# Patient Record
Sex: FEMALE | Race: WHITE | NOT HISPANIC OR LATINO | Employment: FULL TIME | URBAN - METROPOLITAN AREA
[De-identification: names, ages, dates, MRNs, and addresses within clinical notes are randomized per-mention and may not be internally consistent; named-entity substitution may affect disease eponyms.]

---

## 2017-01-02 RX ORDER — SUMATRIPTAN 50 MG/1
TABLET, FILM COATED ORAL
Qty: 9 TAB | Refills: 6 | Status: SHIPPED | OUTPATIENT
Start: 2017-01-02 | End: 2017-05-17

## 2017-01-11 ENCOUNTER — APPOINTMENT (OUTPATIENT)
Dept: ADMISSIONS | Facility: MEDICAL CENTER | Age: 57
End: 2017-01-11
Payer: COMMERCIAL

## 2017-01-15 ENCOUNTER — OFFICE VISIT (OUTPATIENT)
Dept: URGENT CARE | Facility: CLINIC | Age: 57
End: 2017-01-15
Payer: COMMERCIAL

## 2017-01-15 VITALS
SYSTOLIC BLOOD PRESSURE: 106 MMHG | HEART RATE: 64 BPM | WEIGHT: 140 LBS | DIASTOLIC BLOOD PRESSURE: 62 MMHG | HEIGHT: 67 IN | OXYGEN SATURATION: 98 % | BODY MASS INDEX: 21.97 KG/M2 | RESPIRATION RATE: 18 BRPM | TEMPERATURE: 97.3 F

## 2017-01-15 DIAGNOSIS — H00.012 HORDEOLUM EXTERNUM OF RIGHT LOWER EYELID: ICD-10-CM

## 2017-01-15 PROCEDURE — 99214 OFFICE O/P EST MOD 30 MIN: CPT | Performed by: PHYSICIAN ASSISTANT

## 2017-01-15 RX ORDER — POLYMYXIN B SULFATE AND TRIMETHOPRIM 1; 10000 MG/ML; [USP'U]/ML
1 SOLUTION OPHTHALMIC EVERY 4 HOURS
Qty: 1 BOTTLE | Refills: 0 | Status: SHIPPED | OUTPATIENT
Start: 2017-01-15 | End: 2017-01-25

## 2017-01-15 ASSESSMENT — ENCOUNTER SYMPTOMS
WHEEZING: 0
EYE DISCHARGE: 1
PHOTOPHOBIA: 0
SORE THROAT: 0
SHORTNESS OF BREATH: 0
DOUBLE VISION: 0
COUGH: 0
CHILLS: 0
EYE PAIN: 1
BLURRED VISION: 1
HEADACHES: 0
PALPITATIONS: 0
EYE REDNESS: 0
FEVER: 0

## 2017-01-15 NOTE — MR AVS SNAPSHOT
"Evelyn Meeks   1/15/2017 12:45 PM   Office Visit   MRN: [id number]    Department:  [facility]   Dept Phone:  [phone]    Description:  Female : 1960   Provider:  Yair Escamilla PA-C           Reason for Visit     Eye Pain (R) side x3 days, blurry vision, swelling underneath eye      Allergies as of 1/15/2017     Allergen Noted Reactions    Shellfish Allergy 2012   Anaphylaxis    RXN=who;e life    Pet [Cat Hair Extract] 2013   Hives    RXN=whole life    Pollen Extract 2013   Itching    RXN=whole life    Lidocaine (Anorectal) [Topicaine] 2016   Rash, Itching    Rash itching    Other Food 2017   Anaphylaxis    Wine coffee      Vital Signs     Blood Pressure Pulse Temperature Respirations Height Weight    106/62 mmHg 64 36.3 °C (97.3 °F) 18 1.702 m (5' 7.01\") 63.504 kg (140 lb)    Body Mass Index Oxygen Saturation Last Menstrual Period Smoking Status          21.92 kg/m2 98% 2002 Never Smoker         Basic Information     Date Of Birth Sex Race Ethnicity Preferred Language    1960 Female White Non- English      Your appointments     2017  8:20 AM   Established Patient with Segundo Crawford D.O.   [address] [id number] [phone]           You will be receiving a confirmation call a few days before your appointment from our automated call confirmation system.              Problem List              ICD-10-CM Priority Class Noted - Resolved    Dyslipidemia E78.5   2011 - Present    Anal fissure K60.2   10/6/2011 - Present    Gastroesophageal reflux disease K21.9   2012 - Present    ADD (attention deficit disorder) F98.8   3/26/2013 - Present    Grief at loss of child F43.21, Z63.4   2015 - Present    Osteoporosis M81.0   1/15/2016 - Present    Other specified hypothyroidism E03.8   2016 - Present    Closed fracture of distal end of right radius " S52.501A   8/10/2016 - Present    Thoracic back pain M54.6   12/12/2016 - Present      Health Maintenance        Date Due Completion Dates    MAMMOGRAM 9/1/2017 9/1/2016, 9/12/2014, 9/2/2011    COLONOSCOPY 9/3/2022 9/3/2012 (Done)    Override on 9/3/2012: Done ([facility] MD Martell)    IMM DTaP/Tdap/Td Vaccine (2 - Td) 6/19/2025 6/19/2015            Current Immunizations     Influenza TIV (IM) 11/22/2013, 10/2/2012    Influenza Vaccine Quad Inj (Pf) 10/25/2016, 11/18/2015, 10/2/2014    Tdap Vaccine 6/19/2015    Tuberculin Skin Test 8/5/2014, 10/15/2013  8:05 AM, 10/17/2012  9:30 AM, 8/22/2012  9:35 AM, 10/18/2011, 11/3/2010  9:34 AM      Below and/or attached are the medications your provider expects you to take. Review all of your home medications and newly ordered medications with your provider and/or pharmacist. Follow medication instructions as directed by your provider and/or pharmacist. Please keep your medication list with you and share with your provider. Update the information when medications are discontinued, doses are changed, or new medications (including over-the-counter products) are added; and carry medication information at all times in the event of emergency situations     Allergies:  SHELLFISH ALLERGY - Anaphylaxis     PET - Hives     POLLEN EXTRACT - Itching     LIDOCAINE (ANORECTAL) - Rash,Itching     OTHER FOOD - Anaphylaxis               Medications  Valid as of: January 15, 2017 -  1:11 PM    Generic Name Brand Name Tablet Size Instructions for use    Alendronate Sodium (Tab) FOSAMAX 70 MG Take 70 mg by mouth every 7 days.        Cholecalciferol (Cap) Vitamin D-3 1000 UNITS Take 2,000 Units by mouth every day.        Gabapentin (Cap) NEURONTIN 300 MG Take 1 Cap by mouth 3 times a day. Start with 300 mg each day for 3 days, then 300 mg bid x 3 days.        Hydrocodone-Acetaminophen (Tab) NORCO 5-325 MG         Ibuprofen (Tab) MOTRIN 200 MG Take 600 mg by mouth every four hours as needed  for Mild Pain.        Levothyroxine Sodium (Tab) SYNTHROID 50 MCG TAKE ONE TABLET BY MOUTH EVERY MORNING ON AN EMPTY STOMACH        Loratadine (Tab) CLARITIN 10 MG TAKE ONE TABLET BY MOUTH DAILY        Pravastatin Sodium (Tab) PRAVACHOL 40 MG Take 1 Tab by mouth every day.        SUMAtriptan Succinate (Tab) IMITREX 50 MG TAKE ONE TABLET BY MOUTH AT ONSET OF HEADACHE; MAY REPEAT ONE TABLET IN 2 HOURS AS NEEDED. MAX OF 2 DOSES A DAY        TraMADol HCl (Tab) ULTRAM 50 MG Take 1 Tab by mouth every four hours as needed for Moderate Pain.        .                 Medicines prescribed today were sent to:     [facility] #[id number] - [place], [place] - [address]    [address] [address]    Phone: [phone] Fax: [phone]    Open 24 Hours?: No      Medication refill instructions:       If your prescription bottle indicates you have medication refills left, it is not necessary to call your provider’s office. Please contact your pharmacy and they will refill your medication.    If your prescription bottle indicates you do not have any refills left, you may request refills at any time through one of the following ways: The online [employer] system (except Urgent Care), by calling your provider’s office, or by asking your pharmacy to contact your provider’s office with a refill request. Medication refills are processed only during regular business hours and may not be available until the next business day. Your provider may request additional information or to have a follow-up visit with you prior to refilling your medication.   *Please Note: Medication refills are assigned a new Rx number when refilled electronically. Your pharmacy may indicate that no refills were authorized even though a new prescription for the same medication is available at the pharmacy. Please request the medicine by name with the pharmacy before contacting your provider for a refill.           [employer] Access Code: Activation  code not generated  Current MyChart Status: Active

## 2017-01-15 NOTE — PROGRESS NOTES
Subjective:      Evelyn Meeks is a 56 y.o. female who presents with Eye Pain            Eye Injury   The right eye is affected. This is a new problem. Episode onset: 3 days ago. There was no injury mechanism. The pain is moderate. There is no known exposure to pink eye. She does not wear contacts. Associated symptoms include blurred vision and an eye discharge. Pertinent negatives include no double vision, eye redness, fever or photophobia. She has tried nothing for the symptoms.       Review of Systems   Constitutional: Negative for fever and chills.   HENT: Negative for congestion, ear pain and sore throat.    Eyes: Positive for blurred vision, pain and discharge. Negative for double vision, photophobia and redness.   Respiratory: Negative for cough, shortness of breath and wheezing.    Cardiovascular: Negative for chest pain and palpitations.   Skin: Negative for rash.   Neurological: Negative for headaches.     All other systems reviewed and are negative.  PMH:  has a past medical history of Hemorrhoid; Kidney stone; Dyslipidemia (8/19/2011); Anal fissure (10/6/2011); Gastroesophageal reflux disease (9/7/2012); No pertinent past medical history; Grief at loss of child (8/4/2015); Osteoporosis (1/15/2016); Measles; Mumps; Chicken pox; UTI (lower urinary tract infection) (2014); High cholesterol; Bronchitis (2015); Pneumonia (2015); Disorder of thyroid; Back pain; and Migraine.  MEDS:   Current outpatient prescriptions:   •  polymixin-trimethoprim (POLYTRIM) 80439-2.1 UNIT/ML-% Solution, Place 1 Drop in both eyes every 4 hours for 10 days., Disp: 1 Bottle, Rfl: 0  •  sumatriptan (IMITREX) 50 MG Tab, TAKE ONE TABLET BY MOUTH AT ONSET OF HEADACHE; MAY REPEAT ONE TABLET IN 2 HOURS AS NEEDED. MAX OF 2 DOSES A DAY, Disp: 9 Tab, Rfl: 6  •  tramadol (ULTRAM) 50 MG Tab, Take 1 Tab by mouth every four hours as needed for Moderate Pain., Disp: 90 Tab, Rfl: 3  •  gabapentin (NEURONTIN) 300 MG Cap, Take 1 Cap by  mouth 3 times a day. Start with 300 mg each day for 3 days, then 300 mg bid x 3 days., Disp: 90 Cap, Rfl: 3  •  Cholecalciferol (VITAMIN D-3) 1000 UNITS Cap, Take 2,000 Units by mouth every day., Disp: , Rfl:   •  levothyroxine (SYNTHROID) 50 MCG Tab, TAKE ONE TABLET BY MOUTH EVERY MORNING ON AN EMPTY STOMACH, Disp: 30 Tab, Rfl: 2  •  alendronate (FOSAMAX) 70 MG Tab, Take 70 mg by mouth every 7 days., Disp: , Rfl:   •  ibuprofen (MOTRIN) 200 MG Tab, Take 600 mg by mouth every four hours as needed for Mild Pain., Disp: , Rfl:   •  pravastatin (PRAVACHOL) 40 MG tablet, Take 1 Tab by mouth every day., Disp: 30 Tab, Rfl: 11  •  loratadine (CLARITIN) 10 MG Tab, TAKE ONE TABLET BY MOUTH DAILY, Disp: 30 Tab, Rfl: 11  •  hydrocodone-acetaminophen (NORCO) 5-325 MG Tab per tablet, , Disp: , Rfl:   ALLERGIES:   Allergies   Allergen Reactions   • Shellfish Allergy Anaphylaxis     RXN=who;e life   • Pet [Cat Hair Extract] Hives     RXN=whole life   • Pollen Extract Itching     RXN=whole life   • Lidocaine (Anorectal) [Topicaine] Rash and Itching     Rash itching   • Other Food Anaphylaxis     Wine coffee     SURGHX:   Past Surgical History   Procedure Laterality Date   • Tonsillectomy     • Hemorrhoidectomy     • Wrist orif Right 8/10/2016     Procedure: WRIST ORIF;  Surgeon: Jono Maynard M.D.;  Location: SURGERY [facility];  Service:    • Pr inject luis daniel chauhan multpl  12/12/2016     Procedure: INJ-INTERCOSTAL MULTIPLE - T9, T10;  Surgeon: Rafat Nichols D.O.;  Location: SURGERY [facility] ORS;  Service: Pain Management   • Abdominal hysterectomy total     • Gyn surgery       c sections x 2   • Gyn surgery       multiple D&C's     SOCHX:  reports that she has never smoked. She has never used smokeless tobacco. She reports that she does not drink alcohol or use illicit drugs.  FH: Family history was reviewed, no pertinent findings to report  Medications, Allergies, and current problem list reviewed today in  "Epic       Objective:     /62 mmHg  Pulse 64  Temp(Src) 36.3 °C (97.3 °F)  Resp 18  Ht 1.702 m (5' 7.01\")  Wt 63.504 kg (140 lb)  BMI 21.92 kg/m2  SpO2 98%  LMP 08/28/2002     Physical Exam   Constitutional: She is oriented to person, place, and time. She appears well-developed and well-nourished.   HENT:   Head: Normocephalic and atraumatic.   Right Ear: Hearing, tympanic membrane, external ear and ear canal normal.   Left Ear: Hearing, tympanic membrane, external ear and ear canal normal.   Nose: Nose normal.   Mouth/Throat: Uvula is midline, oropharynx is clear and moist and mucous membranes are normal.   Eyes: Conjunctivae, EOM and lids are normal. Pupils are equal, round, and reactive to light. Lids are everted and swept, no foreign bodies found. Right eye exhibits hordeolum. Right eye exhibits no chemosis, no discharge and no exudate. No foreign body present in the right eye. Left eye exhibits no discharge. Right conjunctiva is not injected.       Neck: Normal range of motion. Neck supple.   Cardiovascular: Normal rate, regular rhythm and normal heart sounds.    Pulmonary/Chest: Effort normal and breath sounds normal.   Neurological: She is alert and oriented to person, place, and time.   Skin: Skin is warm and dry.   Vitals reviewed.              Assessment/Plan:   Patient is a 56 year old female who presents with eye pain for 3 days.  Patient eye pain. No contact lens use. Unknown exposure to pink eye. No vision loss. No itching or clear allergic trigger. No trauma or FB.  There is some swelling in the right lower lid. Appears to be a hordeolum.  No scleral injection.       1. Hordeolum externum of right lower eyelid  -warm/cold compress  - polymixin-trimethoprim (POLYTRIM) 85376-2.1 UNIT/ML-% Solution; Place 1 Drop in both eyes every 4 hours for 10 days.  Dispense: 1 Bottle; Refill: 0    Differential diagnosis, natural history, supportive care, and indications for immediate follow-up discussed " at length.   Follow-up with primary care provider within 4-5 days, emergency room precautions discussed.  Patient and/or family appears understanding of information.

## 2017-01-19 ENCOUNTER — OFFICE VISIT (OUTPATIENT)
Dept: MEDICAL GROUP | Facility: CLINIC | Age: 57
End: 2017-01-19
Payer: COMMERCIAL

## 2017-01-19 ENCOUNTER — HOSPITAL ENCOUNTER (OUTPATIENT)
Dept: RADIOLOGY | Facility: MEDICAL CENTER | Age: 57
End: 2017-01-19
Attending: INTERNAL MEDICINE
Payer: COMMERCIAL

## 2017-01-19 VITALS
WEIGHT: 146 LBS | DIASTOLIC BLOOD PRESSURE: 70 MMHG | OXYGEN SATURATION: 96 % | HEIGHT: 67 IN | RESPIRATION RATE: 14 BRPM | SYSTOLIC BLOOD PRESSURE: 126 MMHG | HEART RATE: 100 BPM | TEMPERATURE: 96.8 F | BODY MASS INDEX: 22.91 KG/M2

## 2017-01-19 DIAGNOSIS — R07.81 RIB PAIN ON LEFT SIDE: ICD-10-CM

## 2017-01-19 DIAGNOSIS — R53.83 OTHER FATIGUE: ICD-10-CM

## 2017-01-19 PROCEDURE — 71101 X-RAY EXAM UNILAT RIBS/CHEST: CPT | Mod: LT

## 2017-01-19 PROCEDURE — 99213 OFFICE O/P EST LOW 20 MIN: CPT | Performed by: INTERNAL MEDICINE

## 2017-01-19 NOTE — PROGRESS NOTES
CC: Evelyn Meeks is a 56 y.o. female is suffering from   Chief Complaint   Patient presents with   • Follow-Up         SUBJECTIVE:  1. Rib pain on left side  Patient's here for follow-up, fractured her ribs January 2016. Continues to have pain and discomfort associated with her left ninth and 10th ribs, x-rays have been ordered.     2. Other fatigue  Patient she needs to suffer with fatigue, I'm concerned that this may be another issue associated either with medications or other undisclosed medical problem.         Past social, family, history:   Social History   Substance Use Topics   • Smoking status: Never Smoker    • Smokeless tobacco: Never Used   • Alcohol Use: No         MEDICATIONS:    Current outpatient prescriptions:   •  polymixin-trimethoprim (POLYTRIM) 46825-7.1 UNIT/ML-% Solution, Place 1 Drop in both eyes every 4 hours for 10 days., Disp: 1 Bottle, Rfl: 0  •  sumatriptan (IMITREX) 50 MG Tab, TAKE ONE TABLET BY MOUTH AT ONSET OF HEADACHE; MAY REPEAT ONE TABLET IN 2 HOURS AS NEEDED. MAX OF 2 DOSES A DAY, Disp: 9 Tab, Rfl: 6  •  tramadol (ULTRAM) 50 MG Tab, Take 1 Tab by mouth every four hours as needed for Moderate Pain., Disp: 90 Tab, Rfl: 3  •  gabapentin (NEURONTIN) 300 MG Cap, Take 1 Cap by mouth 3 times a day. Start with 300 mg each day for 3 days, then 300 mg bid x 3 days., Disp: 90 Cap, Rfl: 3  •  Cholecalciferol (VITAMIN D-3) 1000 UNITS Cap, Take 2,000 Units by mouth every day., Disp: , Rfl:   •  levothyroxine (SYNTHROID) 50 MCG Tab, TAKE ONE TABLET BY MOUTH EVERY MORNING ON AN EMPTY STOMACH, Disp: 30 Tab, Rfl: 2  •  hydrocodone-acetaminophen (NORCO) 5-325 MG Tab per tablet, , Disp: , Rfl:   •  alendronate (FOSAMAX) 70 MG Tab, Take 70 mg by mouth every 7 days., Disp: , Rfl:   •  ibuprofen (MOTRIN) 200 MG Tab, Take 600 mg by mouth every four hours as needed for Mild Pain., Disp: , Rfl:   •  pravastatin (PRAVACHOL) 40 MG tablet, Take 1 Tab by mouth every day., Disp: 30 Tab, Rfl:  "11  •  loratadine (CLARITIN) 10 MG Tab, TAKE ONE TABLET BY MOUTH DAILY, Disp: 30 Tab, Rfl: 11    PROBLEMS:  Patient Active Problem List    Diagnosis Date Noted   • Thoracic back pain 12/12/2016   • Closed fracture of distal end of right radius 08/10/2016   • Other specified hypothyroidism 07/19/2016   • Osteoporosis 01/15/2016   • Grief at loss of child 08/04/2015   • ADD (attention deficit disorder) 03/26/2013   • Gastroesophageal reflux disease 09/07/2012   • Anal fissure 10/06/2011   • Dyslipidemia 08/19/2011       REVIEW OF SYSTEMS:  Gen.:  No Nausea, Vomiting, fever, Chills.  Heart: No chest pain.  Lungs:  No shortness of Breath.  Psychological: Geo unusual Anxiety depression     PHYSICAL EXAM   Constitutional: Alert, cooperative, not in acute distress.  Cardiovascular:  Rate Rhythm is regular without murmurs rubs clicks.     Thorax & Lungs: Clear to auscultation, no wheezing, rhonchi, or rales  HENT: Normocephalic, Atraumatic.  Eyes: PERRLA, EOMI, Conjunctiva normal.   Neck: Trachia is midline no swelling of the thyroid.   Musculoskeletal: Patient with pain to palpation T9 T10 ribs midaxillary line  Neurologic: Alert & oriented x 3, cranial nerves II through XII are intact, Normal motor function, Normal sensory function, No focal deficits noted.   Psychiatric: Affect normal, Judgment normal, Mood normal.     VITAL SIGNS:/70 mmHg  Pulse 100  Temp(Src) 36 °C (96.8 °F)  Resp 14  Ht 1.702 m (5' 7\")  Wt 66.225 kg (146 lb)  BMI 22.86 kg/m2  SpO2 96%  LMP 08/28/2002    Labs: Reviewed    Assessment:                                                     Plan:    1. Rib pain on left side  Patient with ongoing left ribs pain, we'll discuss the case with Dr. Mccauley this afternoon if possible.   - [id number] (W/O CXR) LEFT; Future    2. Other fatigue  Patient ongoing problems with fatigue, etiology uncertain suspect this multifactorial and continues to need to be addressed. Patient's mammogram from " September 2016 was reviewed.

## 2017-01-19 NOTE — MR AVS SNAPSHOT
"Evelyn Meeks   2017 8:20 AM   Office Visit   MRN: [id number]    Department:  [facility]   Dept Phone:  [phone]    Description:  Female : 1960   Provider:  Segundo Crawford D.O.           Reason for Visit     Follow-Up           Allergies as of 2017     Allergen Noted Reactions    Shellfish Allergy 2012   Anaphylaxis    RXN=who;e life    Pet [Cat Hair Extract] 2013   Hives    RXN=whole life    Pollen Extract 2013   Itching    RXN=whole life    Lidocaine (Anorectal) [Topicaine] 2016   Rash, Itching    Rash itching    Other Food 2017   Anaphylaxis    Wine coffee      You were diagnosed with     Rib pain on left side   [256189]       Other fatigue   [[id number]]         Vital Signs     Blood Pressure Pulse Temperature Respirations Height Weight    126/70 mmHg 100 36 °C (96.8 °F) 14 1.702 m (5' 7\") 66.225 kg (146 lb)    Body Mass Index Oxygen Saturation Last Menstrual Period Smoking Status          22.86 kg/m2 96% 2002 Never Smoker         Basic Information     Date Of Birth Sex Race Ethnicity Preferred Language    1960 Female White Non- English      Your appointments     2017  8:40 AM   Established Patient with Segundo Crawford D.O.   [address] [id number] [phone]           You will be receiving a confirmation call a few days before your appointment from our automated call confirmation system.              Problem List              ICD-10-CM Priority Class Noted - Resolved    Dyslipidemia E78.5   2011 - Present    Anal fissure K60.2   10/6/2011 - Present    Gastroesophageal reflux disease K21.9   2012 - Present    ADD (attention deficit disorder) F98.8   3/26/2013 - Present    Grief at loss of child F43.21, Z63.4   2015 - Present    Osteoporosis M81.0   1/15/2016 - Present    Other specified hypothyroidism E03.8   2016 - Present   " Closed fracture of distal end of right radius S52.501A   8/10/2016 - Present    Thoracic back pain M54.6   12/12/2016 - Present      Health Maintenance        Date Due Completion Dates    MAMMOGRAM 9/1/2017 9/1/2016, 9/12/2014, 9/2/2011    COLONOSCOPY 9/3/2022 9/3/2012 (Done)    Override on 9/3/2012: Done ([facility] MD Martell)    IMM DTaP/Tdap/Td Vaccine (2 - Td) 6/19/2025 6/19/2015            Current Immunizations     Influenza TIV (IM) 11/22/2013, 10/2/2012    Influenza Vaccine Quad Inj (Pf) 10/25/2016, 11/18/2015, 10/2/2014    Tdap Vaccine 6/19/2015    Tuberculin Skin Test 8/5/2014, 10/15/2013  8:05 AM, 10/17/2012  9:30 AM, 8/22/2012  9:35 AM, 10/18/2011, 11/3/2010  9:34 AM      Below and/or attached are the medications your provider expects you to take. Review all of your home medications and newly ordered medications with your provider and/or pharmacist. Follow medication instructions as directed by your provider and/or pharmacist. Please keep your medication list with you and share with your provider. Update the information when medications are discontinued, doses are changed, or new medications (including over-the-counter products) are added; and carry medication information at all times in the event of emergency situations     Allergies:  SHELLFISH ALLERGY - Anaphylaxis     PET - Hives     POLLEN EXTRACT - Itching     LIDOCAINE (ANORECTAL) - Rash,Itching     OTHER FOOD - Anaphylaxis               Medications  Valid as of: January 19, 2017 -  9:47 AM    Generic Name Brand Name Tablet Size Instructions for use    Alendronate Sodium (Tab) FOSAMAX 70 MG Take 70 mg by mouth every 7 days.        Cholecalciferol (Cap) Vitamin D-3 1000 UNITS Take 2,000 Units by mouth every day.        Gabapentin (Cap) NEURONTIN 300 MG Take 1 Cap by mouth 3 times a day. Start with 300 mg each day for 3 days, then 300 mg bid x 3 days.        Hydrocodone-Acetaminophen (Tab) NORCO 5-325 MG         Ibuprofen (Tab) MOTRIN 200 MG  Take 600 mg by mouth every four hours as needed for Mild Pain.        Levothyroxine Sodium (Tab) SYNTHROID 50 MCG TAKE ONE TABLET BY MOUTH EVERY MORNING ON AN EMPTY STOMACH        Loratadine (Tab) CLARITIN 10 MG TAKE ONE TABLET BY MOUTH DAILY        Polymyxin B-Trimethoprim (Solution) POLYTRIM 54713-5.1 UNIT/ML-% Place 1 Drop in both eyes every 4 hours for 10 days.        Pravastatin Sodium (Tab) PRAVACHOL 40 MG Take 1 Tab by mouth every day.        SUMAtriptan Succinate (Tab) IMITREX 50 MG TAKE ONE TABLET BY MOUTH AT ONSET OF HEADACHE; MAY REPEAT ONE TABLET IN 2 HOURS AS NEEDED. MAX OF 2 DOSES A DAY        TraMADol HCl (Tab) ULTRAM 50 MG Take 1 Tab by mouth every four hours as needed for Moderate Pain.        .                 Medicines prescribed today were sent to:     [facility] #[id number] - [place], [place] - [address] [address]    Phone: [phone] Fax: [phone]    Open 24 Hours?: No      Medication refill instructions:       If your prescription bottle indicates you have medication refills left, it is not necessary to call your provider’s office. Please contact your pharmacy and they will refill your medication.    If your prescription bottle indicates you do not have any refills left, you may request refills at any time through one of the following ways: The online [employer] system (except Urgent Care), by calling your provider’s office, or by asking your pharmacy to contact your provider’s office with a refill request. Medication refills are processed only during regular business hours and may not be available until the next business day. Your provider may request additional information or to have a follow-up visit with you prior to refilling your medication.   *Please Note: Medication refills are assigned a new Rx number when refilled electronically. Your pharmacy may indicate that no refills were authorized even though a new prescription for the same medication  is available at the pharmacy. Please request the medicine by name with the pharmacy before contacting your provider for a refill.        Your To Do List     Future Labs/Procedures Complete By Expires    [id number] (W/O CXR) LEFT  As directed 1/19/2018         [employer] Access Code: Activation code not generated  Current [employer] Status: Active

## 2017-01-20 PROBLEM — G56.01 CARPAL TUNNEL SYNDROME ON RIGHT: Status: ACTIVE | Noted: 2017-01-20

## 2017-02-01 RX ORDER — ALENDRONATE SODIUM 70 MG/1
TABLET ORAL
Qty: 4 TAB | Refills: 10 | Status: SHIPPED | OUTPATIENT
Start: 2017-02-01 | End: 2017-06-06

## 2017-02-17 ENCOUNTER — TELEPHONE (OUTPATIENT)
Dept: MEDICAL GROUP | Facility: CLINIC | Age: 57
End: 2017-02-17

## 2017-02-17 ENCOUNTER — OFFICE VISIT (OUTPATIENT)
Dept: MEDICAL GROUP | Facility: CLINIC | Age: 57
End: 2017-02-17
Payer: COMMERCIAL

## 2017-02-17 VITALS
SYSTOLIC BLOOD PRESSURE: 108 MMHG | HEART RATE: 68 BPM | TEMPERATURE: 96.4 F | DIASTOLIC BLOOD PRESSURE: 74 MMHG | WEIGHT: 142 LBS | HEIGHT: 67 IN | OXYGEN SATURATION: 98 % | BODY MASS INDEX: 22.29 KG/M2 | RESPIRATION RATE: 14 BRPM

## 2017-02-17 DIAGNOSIS — Z63.0 MARITAL CONFLICT: ICD-10-CM

## 2017-02-17 DIAGNOSIS — R07.81 RIB PAIN ON LEFT SIDE: ICD-10-CM

## 2017-02-17 PROCEDURE — 99213 OFFICE O/P EST LOW 20 MIN: CPT | Performed by: INTERNAL MEDICINE

## 2017-02-17 SDOH — SOCIAL STABILITY - SOCIAL INSECURITY: PROBLEMS IN RELATIONSHIP WITH SPOUSE OR PARTNER: Z63.0

## 2017-02-17 NOTE — TELEPHONE ENCOUNTER
----- Message from Segundo Crawford D.O. sent at 2/17/2017  9:05 AM PST -----  Please call Dr. Mccauley (maría ) at [facility]. So I can talk to him about Elissae.   Regards, Segundo Crawford, DO

## 2017-02-17 NOTE — MR AVS SNAPSHOT
"        Evelyn Meeks   2017 8:40 AM   Office Visit   MRN: [id number]    Department:  [facility]   Dept Phone:  [phone]    Description:  Female : 1960   Provider:  Segundo Crawford D.O.           Reason for Visit     Follow-Up 1 month      Allergies as of 2017     Allergen Noted Reactions    Other Food 2017   Anaphylaxis    Wine coffee    Shellfish Allergy 2012   Anaphylaxis    RXN=whole life    Lidocaine (Anorectal) [Topicaine] 2016   Rash, Itching     patch glue        Pet [Cat Hair Extract] 2013   Hives    RXN=whole life    Pollen Extract 2013   Itching    RXN=whole life      Vital Signs     Blood Pressure Pulse Temperature Respirations Height Weight    108/74 mmHg 68 35.8 °C (96.4 °F) 14 1.702 m (5' 7\") 64.411 kg (142 lb)    Body Mass Index Oxygen Saturation Last Menstrual Period Smoking Status          22.24 kg/m2 98% 2002 Never Smoker         Basic Information     Date Of Birth Sex Race Ethnicity Preferred Language    1960 Female White Non- English      Problem List              ICD-10-CM Priority Class Noted - Resolved    Dyslipidemia E78.5   2011 - Present    Anal fissure K60.2   10/6/2011 - Present    Gastroesophageal reflux disease K21.9   2012 - Present    ADD (attention deficit disorder) F98.8   3/26/2013 - Present    Grief at loss of child F43.21, Z63.4   2015 - Present    Osteoporosis M81.0   1/15/2016 - Present    Other specified hypothyroidism E03.8   2016 - Present    Closed fracture of distal end of right radius S52.501A   8/10/2016 - Present    Thoracic back pain M54.6   2016 - Present    Carpal tunnel syndrome on right G56.01   2017 - Present      Health Maintenance        Date Due Completion Dates    MAMMOGRAM 2017, 2014, 2011    COLONOSCOPY 9/3/2022 9/3/2012 (Done)    Override on 9/3/2012: Done ([facility] MD Martell)    IMM DTaP/Tdap/Td Vaccine (2 - " Td) 6/19/2025 6/19/2015            Current Immunizations     Influenza TIV (IM) 11/22/2013, 10/2/2012    Influenza Vaccine Quad Inj (Pf) 10/25/2016, 11/18/2015, 10/2/2014    Tdap Vaccine 6/19/2015    Tuberculin Skin Test 8/5/2014, 10/15/2013  8:05 AM, 10/17/2012  9:30 AM, 8/22/2012  9:35 AM, 10/18/2011, 11/3/2010  9:34 AM      Below and/or attached are the medications your provider expects you to take. Review all of your home medications and newly ordered medications with your provider and/or pharmacist. Follow medication instructions as directed by your provider and/or pharmacist. Please keep your medication list with you and share with your provider. Update the information when medications are discontinued, doses are changed, or new medications (including over-the-counter products) are added; and carry medication information at all times in the event of emergency situations     Allergies:  OTHER FOOD - Anaphylaxis     SHELLFISH ALLERGY - Anaphylaxis     LIDOCAINE (ANORECTAL) - Rash,Itching     PET - Hives     POLLEN EXTRACT - Itching               Medications  Valid as of: February 17, 2017 -  9:08 AM    Generic Name Brand Name Tablet Size Instructions for use    Alendronate Sodium (Tab) FOSAMAX 70 MG TAKE 1 TABLET BY MOUTH ONCE WEEKLY BEFORE BREAKFAST, ON AN EMPTY STOMACH: REMAIN UPRIGHT FOR 30 MINUTES        Cholecalciferol (Cap) Vitamin D-3 1000 UNITS Take 2,000 Units by mouth every day.        Gabapentin (Cap) NEURONTIN 300 MG Take 1 Cap by mouth 3 times a day. Start with 300 mg each day for 3 days, then 300 mg bid x 3 days.        Hydrocodone-Acetaminophen (Tab) NORCO 5-325 MG Take 1-2 Tabs by mouth every four hours as needed.        Ibuprofen (Tab) MOTRIN 200 MG Take 600 mg by mouth every four hours as needed for Mild Pain.        Levothyroxine Sodium (Tab) SYNTHROID 50 MCG TAKE ONE TABLET BY MOUTH EVERY MORNING ON AN EMPTY STOMACH        Loratadine (Tab) CLARITIN 10 MG TAKE ONE TABLET BY MOUTH DAILY         Pravastatin Sodium (Tab) PRAVACHOL 40 MG Take 1 Tab by mouth every day.        SUMAtriptan Succinate (Tab) IMITREX 50 MG TAKE ONE TABLET BY MOUTH AT ONSET OF HEADACHE; MAY REPEAT ONE TABLET IN 2 HOURS AS NEEDED. MAX OF 2 DOSES A DAY        TraMADol HCl (Tab) ULTRAM 50 MG Take 1 Tab by mouth every four hours as needed for Moderate Pain.        .                 Medicines prescribed today were sent to:     [facility] #[id number] - [place], [place] - [address]    [address] [place] [address]    Phone: [phone] Fax: [phone]    Open 24 Hours?: No      Medication refill instructions:       If your prescription bottle indicates you have medication refills left, it is not necessary to call your provider’s office. Please contact your pharmacy and they will refill your medication.    If your prescription bottle indicates you do not have any refills left, you may request refills at any time through one of the following ways: The online [employer] system (except Urgent Care), by calling your provider’s office, or by asking your pharmacy to contact your provider’s office with a refill request. Medication refills are processed only during regular business hours and may not be available until the next business day. Your provider may request additional information or to have a follow-up visit with you prior to refilling your medication.   *Please Note: Medication refills are assigned a new Rx number when refilled electronically. Your pharmacy may indicate that no refills were authorized even though a new prescription for the same medication is available at the pharmacy. Please request the medicine by name with the pharmacy before contacting your provider for a refill.           [employer] Access Code: Activation code not generated  Current [employer] Status: Active

## 2017-02-17 NOTE — PROGRESS NOTES
CC: Evelyn Meeks is a 56 y.o. female is suffering from   Chief Complaint   Patient presents with   • Follow-Up     1 month         SUBJECTIVE:  1. Rib pain on left side  Patient is here complaining of continued problems with left for pain. He has been seen by Dr. Foss. Telephone call to his office, office notes obtain unfortunately Dr. Foss is on vacation patient is to be seen under Workmen's Compensation in [place] for acupuncture.     2. Marital conflict  In talking with the patient she and her  are having difficulty with her relationship, but states that her 's very distant, she was left with a responsibly for discontinuing life support for their son, was abandoned by her  and other sons regarding this issue.         Past social, family, history:   Social History   Substance Use Topics   • Smoking status: Never Smoker    • Smokeless tobacco: Never Used   • Alcohol Use: No         MEDICATIONS:    Current outpatient prescriptions:   •  alendronate (FOSAMAX) 70 MG Tab, TAKE 1 TABLET BY MOUTH ONCE WEEKLY BEFORE BREAKFAST, ON AN EMPTY STOMACH: REMAIN UPRIGHT FOR 30 MINUTES, Disp: 4 Tab, Rfl: 10  •  hydrocodone-acetaminophen (NORCO) 5-325 MG Tab per tablet, Take 1-2 Tabs by mouth every four hours as needed., Disp: 30 Tab, Rfl: 0  •  sumatriptan (IMITREX) 50 MG Tab, TAKE ONE TABLET BY MOUTH AT ONSET OF HEADACHE; MAY REPEAT ONE TABLET IN 2 HOURS AS NEEDED. MAX OF 2 DOSES A DAY, Disp: 9 Tab, Rfl: 6  •  tramadol (ULTRAM) 50 MG Tab, Take 1 Tab by mouth every four hours as needed for Moderate Pain., Disp: 90 Tab, Rfl: 3  •  gabapentin (NEURONTIN) 300 MG Cap, Take 1 Cap by mouth 3 times a day. Start with 300 mg each day for 3 days, then 300 mg bid x 3 days., Disp: 90 Cap, Rfl: 3  •  Cholecalciferol (VITAMIN D-3) 1000 UNITS Cap, Take 2,000 Units by mouth every day., Disp: , Rfl:   •  levothyroxine (SYNTHROID) 50 MCG Tab, TAKE ONE TABLET BY MOUTH EVERY MORNING ON AN EMPTY STOMACH,  "Disp: 30 Tab, Rfl: 2  •  ibuprofen (MOTRIN) 200 MG Tab, Take 600 mg by mouth every four hours as needed for Mild Pain., Disp: , Rfl:   •  pravastatin (PRAVACHOL) 40 MG tablet, Take 1 Tab by mouth every day., Disp: 30 Tab, Rfl: 11  •  loratadine (CLARITIN) 10 MG Tab, TAKE ONE TABLET BY MOUTH DAILY, Disp: 30 Tab, Rfl: 11    PROBLEMS:  Patient Active Problem List    Diagnosis Date Noted   • Carpal tunnel syndrome on right 01/20/2017   • Thoracic back pain 12/12/2016   • Closed fracture of distal end of right radius 08/10/2016   • Other specified hypothyroidism 07/19/2016   • Osteoporosis 01/15/2016   • Grief at loss of child 08/04/2015   • ADD (attention deficit disorder) 03/26/2013   • Gastroesophageal reflux disease 09/07/2012   • Anal fissure 10/06/2011   • Dyslipidemia 08/19/2011       REVIEW OF SYSTEMS:  Gen.:  No Nausea, Vomiting, fever, Chills.  Heart: No chest pain.  Lungs:  No shortness of Breath.  Psychological: Geo unusual Anxiety depression     PHYSICAL EXAM   Constitutional: Alert, cooperative, not in acute distress.  Cardiovascular:  Rate Rhythm is regular without murmurs rubs clicks.     Thorax & Lungs: Clear to auscultation, no wheezing, rhonchi, or rales  HENT: Normocephalic, Atraumatic.  Eyes: PERRLA, EOMI, Conjunctiva normal.   Neck: Trachia is midline no swelling of the thyroid.   Neurologic: Alert & oriented x 3, cranial nerves II through XII are intact, Normal motor function, Normal sensory function, No focal deficits noted.   Psychiatric: Affect normal, Judgment normal, Mood normal.     VITAL SIGNS:/74 mmHg  Pulse 68  Temp(Src) 35.8 °C (96.4 °F)  Resp 14  Ht 1.702 m (5' 7\")  Wt 64.411 kg (142 lb)  BMI 22.24 kg/m2  SpO2 98%  LMP 08/28/2002    Labs: Reviewed    Assessment:                                                     Plan:    1. Rib pain on left side  Continued musculoskeletal pain left side x-rays reviewed no evidence of obvious fracture suspect underlying damage to " neurovascular bundle.     2. Marital conflict  Patient marital conflict, is undergoing counseling through her [employer]

## 2017-02-21 ENCOUNTER — TELEPHONE (OUTPATIENT)
Dept: MEDICAL GROUP | Facility: CLINIC | Age: 57
End: 2017-02-21

## 2017-02-21 DIAGNOSIS — N39.0 URINARY TRACT INFECTION, SITE UNSPECIFIED: ICD-10-CM

## 2017-02-21 RX ORDER — NITROFURANTOIN 25; 75 MG/1; MG/1
100 CAPSULE ORAL 2 TIMES DAILY
Qty: 10 CAP | Refills: 0 | Status: SHIPPED | OUTPATIENT
Start: 2017-02-21 | End: 2017-05-26

## 2017-02-22 NOTE — TELEPHONE ENCOUNTER
1. Caller Name: Patient                                         Call Back Number: [phone] (home) [phone] (work)      Patient approves a detailed voicemail message: yes    Patient states she has a UTI and would like to get an antibiotic. Please advise.

## 2017-02-22 NOTE — TELEPHONE ENCOUNTER
Patient notified, patient asked to please have medication sent to [facility] pharmacy on [address]  Please advise.

## 2017-02-28 ENCOUNTER — TELEPHONE (OUTPATIENT)
Dept: MEDICAL GROUP | Facility: CLINIC | Age: 57
End: 2017-02-28

## 2017-02-28 DIAGNOSIS — R07.81 RIB PAIN ON LEFT SIDE: ICD-10-CM

## 2017-02-28 NOTE — TELEPHONE ENCOUNTER
1. Caller Name: [facility]                                         Call Back Number: [phone]      Patient approves a detailed voicemail message: N\A    [facility] states they need a new referral for patient to continue to be seen. Please advise.

## 2017-03-09 RX ORDER — LEVOTHYROXINE SODIUM 0.05 MG/1
TABLET ORAL
Qty: 30 TAB | Refills: 11 | Status: SHIPPED | OUTPATIENT
Start: 2017-03-09 | End: 2018-04-06 | Stop reason: SDUPTHER

## 2017-03-09 NOTE — TELEPHONE ENCOUNTER
Was the patient seen in the last year in this department? Yes    Does patient have an active prescription for medications requested? No     Received Request Via: Pharmacy

## 2017-03-17 ENCOUNTER — OFFICE VISIT (OUTPATIENT)
Dept: MEDICAL GROUP | Facility: CLINIC | Age: 57
End: 2017-03-17
Payer: COMMERCIAL

## 2017-03-17 VITALS
HEART RATE: 72 BPM | HEIGHT: 67 IN | TEMPERATURE: 98.4 F | DIASTOLIC BLOOD PRESSURE: 66 MMHG | SYSTOLIC BLOOD PRESSURE: 118 MMHG | OXYGEN SATURATION: 99 % | BODY MASS INDEX: 21.35 KG/M2 | WEIGHT: 136 LBS

## 2017-03-17 DIAGNOSIS — R91.8 ABNORMAL CT LUNG SCREENING: ICD-10-CM

## 2017-03-17 PROCEDURE — 99213 OFFICE O/P EST LOW 20 MIN: CPT | Performed by: INTERNAL MEDICINE

## 2017-03-17 NOTE — PROGRESS NOTES
CC: Evelyn Meeks is a 56 y.o. female is suffering from   Chief Complaint   Patient presents with   • Follow-Up     1 month FV         SUBJECTIVE:  1. Abnormal CT lung screening  Reviewed patients ct of the lung suspect over read.  Will verify questionable copd with pft.         Past social, family, history: .   Social History   Substance Use Topics   • Smoking status: Never Smoker    • Smokeless tobacco: Never Used   • Alcohol Use: No         MEDICATIONS:    Current outpatient prescriptions:   •  alendronate (FOSAMAX) 70 MG Tab, TAKE 1 TABLET BY MOUTH ONCE WEEKLY BEFORE BREAKFAST, ON AN EMPTY STOMACH: REMAIN UPRIGHT FOR 30 MINUTES, Disp: 4 Tab, Rfl: 10  •  hydrocodone-acetaminophen (NORCO) 5-325 MG Tab per tablet, Take 1-2 Tabs by mouth every four hours as needed., Disp: 30 Tab, Rfl: 0  •  sumatriptan (IMITREX) 50 MG Tab, TAKE ONE TABLET BY MOUTH AT ONSET OF HEADACHE; MAY REPEAT ONE TABLET IN 2 HOURS AS NEEDED. MAX OF 2 DOSES A DAY, Disp: 9 Tab, Rfl: 6  •  tramadol (ULTRAM) 50 MG Tab, Take 1 Tab by mouth every four hours as needed for Moderate Pain., Disp: 90 Tab, Rfl: 3  •  gabapentin (NEURONTIN) 300 MG Cap, Take 1 Cap by mouth 3 times a day. Start with 300 mg each day for 3 days, then 300 mg bid x 3 days., Disp: 90 Cap, Rfl: 3  •  Cholecalciferol (VITAMIN D-3) 1000 UNITS Cap, Take 2,000 Units by mouth every day., Disp: , Rfl:   •  ibuprofen (MOTRIN) 200 MG Tab, Take 600 mg by mouth every four hours as needed for Mild Pain., Disp: , Rfl:   •  pravastatin (PRAVACHOL) 40 MG tablet, Take 1 Tab by mouth every day., Disp: 30 Tab, Rfl: 11  •  loratadine (CLARITIN) 10 MG Tab, TAKE ONE TABLET BY MOUTH DAILY, Disp: 30 Tab, Rfl: 11  •  levothyroxine (SYNTHROID) 50 MCG Tab, TAKE ONE TABLET BY MOUTH EVERY MORNING ON AN EMPTY STOMACH, Disp: 30 Tab, Rfl: 11  •  nitrofurantoin monohydr macro (MACROBID) 100 MG Cap, Take 1 Cap by mouth 2 times a day., Disp: 10 Cap, Rfl: 0    PROBLEMS:  Patient Active Problem List  "   Diagnosis Date Noted   • Carpal tunnel syndrome on right 01/20/2017   • Thoracic back pain 12/12/2016   • Closed fracture of distal end of right radius 08/10/2016   • Other specified hypothyroidism 07/19/2016   • Osteoporosis 01/15/2016   • Grief at loss of child 08/04/2015   • ADD (attention deficit disorder) 03/26/2013   • Gastroesophageal reflux disease 09/07/2012   • Anal fissure 10/06/2011   • Dyslipidemia 08/19/2011       REVIEW OF SYSTEMS:  Gen.:  No Nausea, Vomiting, fever, Chills.  Heart: No chest pain.  Lungs:  No shortness of Breath.  Psychological: Geo unusual Anxiety depression     PHYSICAL EXAM   Constitutional: Alert, cooperative, not in acute distress.  Cardiovascular:  Rate Rhythm is regular without murmurs rubs clicks.     Thorax & Lungs: Clear to auscultation, no wheezing, rhonchi, or rales  HENT: Normocephalic, Atraumatic.  Eyes: PERRLA, EOMI, Conjunctiva normal.   Neck: Trachia is midline no swelling of the thyroid.   Lymphatic: No lymphadenopathy noted.   Neurologic: Alert & oriented x 3, cranial nerves II through XII are intact, Normal motor function, Normal sensory function, No focal deficits noted.   Psychiatric: Affect normal, Judgment normal, Mood normal.     VITAL SIGNS:/66 mmHg  Pulse 72  Temp(Src) 36.9 °C (98.4 °F)  Ht 1.702 m (5' 7.01\")  Wt 61.689 kg (136 lb)  BMI 21.30 kg/m2  SpO2 99%  LMP 08/28/2002    Labs: Reviewed    Assessment:                                                     Plan:    1. Abnormal CT lung screening  Orders written.   - PULMONARY FUNCTION TESTS Test requested: Complete Pulmonary Function Test          "

## 2017-03-17 NOTE — MR AVS SNAPSHOT
"        Evelyn Meeks   3/17/2017 8:40 AM   Office Visit   MRN: [id number]    Department:  [facility]   Dept Phone:  [phone]    Description:  Female : 1960   Provider:  Segundo Crawford D.O.           Reason for Visit     Follow-Up 1 month FV      Allergies as of 3/17/2017     Allergen Noted Reactions    Other Food 2017   Anaphylaxis    Wine coffee    Shellfish Allergy 2012   Anaphylaxis    RXN=whole life    Lidocaine (Anorectal) [Topicaine] 2016   Rash, Itching     patch glue        Pet [Cat Hair Extract] 2013   Hives    RXN=whole life    Pollen Extract 2013   Itching    RXN=whole life      You were diagnosed with     Abnormal CT lung screening   [201315]         Vital Signs     Blood Pressure Pulse Temperature Height Weight Body Mass Index    118/66 mmHg 72 36.9 °C (98.4 °F) 1.702 m (5' 7.01\") 61.689 kg (136 lb) 21.30 kg/m2    Oxygen Saturation Last Menstrual Period Smoking Status             99% 2002 Never Smoker          Basic Information     Date Of Birth Sex Race Ethnicity Preferred Language    1960 Female White Non- English      Problem List              ICD-10-CM Priority Class Noted - Resolved    Dyslipidemia E78.5   2011 - Present    Anal fissure K60.2   10/6/2011 - Present    Gastroesophageal reflux disease K21.9   2012 - Present    ADD (attention deficit disorder) F98.8   3/26/2013 - Present    Grief at loss of child F43.21, Z63.4   2015 - Present    Osteoporosis M81.0   1/15/2016 - Present    Other specified hypothyroidism E03.8   2016 - Present    Closed fracture of distal end of right radius S52.501A   8/10/2016 - Present    Thoracic back pain M54.6   2016 - Present    Carpal tunnel syndrome on right G56.01   2017 - Present      Health Maintenance        Date Due Completion Dates    MAMMOGRAM 2017, 2014, 2011    COLONOSCOPY 9/3/2022 9/3/2012 (Done)    Override on 9/3/2012: " Done ([facility] MD Martell)    IMM DTaP/Tdap/Td Vaccine (2 - Td) 6/19/2025 6/19/2015            Current Immunizations     Influenza TIV (IM) 11/22/2013, 10/2/2012    Influenza Vaccine Quad Inj (Pf) 10/25/2016, 11/18/2015, 10/2/2014    Tdap Vaccine 6/19/2015    Tuberculin Skin Test 8/5/2014, 10/15/2013  8:05 AM, 10/17/2012  9:30 AM, 8/22/2012  9:35 AM, 10/18/2011, 11/3/2010  9:34 AM      Below and/or attached are the medications your provider expects you to take. Review all of your home medications and newly ordered medications with your provider and/or pharmacist. Follow medication instructions as directed by your provider and/or pharmacist. Please keep your medication list with you and share with your provider. Update the information when medications are discontinued, doses are changed, or new medications (including over-the-counter products) are added; and carry medication information at all times in the event of emergency situations     Allergies:  OTHER FOOD - Anaphylaxis     SHELLFISH ALLERGY - Anaphylaxis     LIDOCAINE (ANORECTAL) - Rash,Itching     PET - Hives     POLLEN EXTRACT - Itching               Medications  Valid as of: March 17, 2017 -  9:10 AM    Generic Name Brand Name Tablet Size Instructions for use    Alendronate Sodium (Tab) FOSAMAX 70 MG TAKE 1 TABLET BY MOUTH ONCE WEEKLY BEFORE BREAKFAST, ON AN EMPTY STOMACH: REMAIN UPRIGHT FOR 30 MINUTES        Cholecalciferol (Cap) Vitamin D-3 1000 UNITS Take 2,000 Units by mouth every day.        Gabapentin (Cap) NEURONTIN 300 MG Take 1 Cap by mouth 3 times a day. Start with 300 mg each day for 3 days, then 300 mg bid x 3 days.        Hydrocodone-Acetaminophen (Tab) NORCO 5-325 MG Take 1-2 Tabs by mouth every four hours as needed.        Ibuprofen (Tab) MOTRIN 200 MG Take 600 mg by mouth every four hours as needed for Mild Pain.        Levothyroxine Sodium (Tab) SYNTHROID 50 MCG TAKE ONE TABLET BY MOUTH EVERY MORNING ON AN EMPTY STOMACH         Loratadine (Tab) CLARITIN 10 MG TAKE ONE TABLET BY MOUTH DAILY        Nitrofurantoin Monohyd Macro (Cap) MACROBID 100 MG Take 1 Cap by mouth 2 times a day.        Pravastatin Sodium (Tab) PRAVACHOL 40 MG Take 1 Tab by mouth every day.        SUMAtriptan Succinate (Tab) IMITREX 50 MG TAKE ONE TABLET BY MOUTH AT ONSET OF HEADACHE; MAY REPEAT ONE TABLET IN 2 HOURS AS NEEDED. MAX OF 2 DOSES A DAY        TraMADol HCl (Tab) ULTRAM 50 MG Take 1 Tab by mouth every four hours as needed for Moderate Pain.        .                 Medicines prescribed today were sent to:     [facility] #[id number] - [place], [place] - [address]    [address] [address]    Phone: [phone] Fax: [phone]    Open 24 Hours?: No    [facility] #[id number] - [place] - [address] [place] [address]    Phone: [phone] Fax: [phone]    Open 24 Hours?: No      Medication refill instructions:       If your prescription bottle indicates you have medication refills left, it is not necessary to call your provider’s office. Please contact your pharmacy and they will refill your medication.    If your prescription bottle indicates you do not have any refills left, you may request refills at any time through one of the following ways: The online [employer] system (except Urgent Care), by calling your provider’s office, or by asking your pharmacy to contact your provider’s office with a refill request. Medication refills are processed only during regular business hours and may not be available until the next business day. Your provider may request additional information or to have a follow-up visit with you prior to refilling your medication.   *Please Note: Medication refills are assigned a new Rx number when refilled electronically. Your pharmacy may indicate that no refills were authorized even though a new prescription for the same medication is available at the pharmacy. Please request the medicine by  name with the pharmacy before contacting your provider for a refill.           MyChart Access Code: Activation code not generated  Current MyChart Status: Active

## 2017-03-20 ENCOUNTER — TELEPHONE (OUTPATIENT)
Dept: MEDICAL GROUP | Facility: CLINIC | Age: 57
End: 2017-03-20

## 2017-03-20 DIAGNOSIS — R07.81 RIB PAIN ON RIGHT SIDE: ICD-10-CM

## 2017-03-20 NOTE — TELEPHONE ENCOUNTER
Nataliia from [facility] called stating Pt needs an additional office request sent over to them today due to PT having her appointment tomorrow with them. Please Advise

## 2017-03-20 NOTE — TELEPHONE ENCOUNTER
Called Nataliia at [facility] and [facility] stating referral was ordered

## 2017-03-30 ENCOUNTER — HOSPITAL ENCOUNTER (OUTPATIENT)
Dept: OTHER | Facility: MEDICAL CENTER | Age: 57
End: 2017-03-30
Attending: INTERNAL MEDICINE
Payer: COMMERCIAL

## 2017-03-30 DIAGNOSIS — R91.8 ABNORMAL CT LUNG SCREENING: ICD-10-CM

## 2017-03-30 PROCEDURE — 94729 DIFFUSING CAPACITY: CPT

## 2017-03-30 PROCEDURE — 94060 EVALUATION OF WHEEZING: CPT | Mod: 26 | Performed by: INTERNAL MEDICINE

## 2017-03-30 PROCEDURE — 94726 PLETHYSMOGRAPHY LUNG VOLUMES: CPT

## 2017-03-30 PROCEDURE — 94729 DIFFUSING CAPACITY: CPT | Mod: 26 | Performed by: INTERNAL MEDICINE

## 2017-03-30 PROCEDURE — 94060 EVALUATION OF WHEEZING: CPT

## 2017-03-30 PROCEDURE — 94726 PLETHYSMOGRAPHY LUNG VOLUMES: CPT | Mod: 26 | Performed by: INTERNAL MEDICINE

## 2017-03-30 ASSESSMENT — PULMONARY FUNCTION TESTS
FEV1/FVC_PERCENT_PREDICTED: 78
FEV1/FVC_PERCENT_PREDICTED: 94
FEV1_PERCENT_CHANGE: -16
FEV1_PERCENT_CHANGE: -19
FEV1_PERCENT_PREDICTED: 82
FEV1: 2.37
FVC_PREDICTED: 3.31
FEV1/FVC_PERCENT_PREDICTED: 110
FEV1/FVC_PERCENT_CHANGE: 84
FVC: 2.86
FEV1/FVC: 85.78
FVC_PERCENT_PREDICTED: 87
FVC: 2.32
FEV1_PERCENT_PREDICTED: 77
FEV1: 1.99
FEV1_PREDICTED: 2.58
FEV1/FVC: 83
FVC_PERCENT_PREDICTED: 70

## 2017-03-31 NOTE — PROCEDURES
INTERPRETATION:  Spirometry is normal, although the maximal voluntary   ventilation is reduced.  There is no significant change in flow rates   following inhaled bronchodilators.  Lung volumes are normal as is diffusion   capacity for carbon monoxide and airways resistance.  The flow volume loop has   an irregular contour, but is otherwise normal.    IMPRESSION:  Normal spirometry, lung volumes, diffusion, and airways   resistance.       ____________________________________     MD MADAI NEVES / FERDINAND    DD:  03/31/2017 09:31:46  DT:  03/31/2017 10:17:14    D#:  [id number]  Job#:  [id number]

## 2017-04-04 DIAGNOSIS — M21.939: ICD-10-CM

## 2017-05-17 ENCOUNTER — OFFICE VISIT (OUTPATIENT)
Dept: MEDICAL GROUP | Facility: CLINIC | Age: 57
End: 2017-05-17
Payer: COMMERCIAL

## 2017-05-17 VITALS
HEIGHT: 67 IN | HEART RATE: 80 BPM | TEMPERATURE: 98.4 F | WEIGHT: 133.5 LBS | SYSTOLIC BLOOD PRESSURE: 110 MMHG | RESPIRATION RATE: 18 BRPM | BODY MASS INDEX: 20.95 KG/M2 | OXYGEN SATURATION: 98 % | DIASTOLIC BLOOD PRESSURE: 80 MMHG

## 2017-05-17 DIAGNOSIS — F43.21 GRIEF AT LOSS OF CHILD: ICD-10-CM

## 2017-05-17 DIAGNOSIS — R91.8 ABNORMAL CT LUNG SCREENING: ICD-10-CM

## 2017-05-17 DIAGNOSIS — Z63.4 GRIEF AT LOSS OF CHILD: ICD-10-CM

## 2017-05-17 DIAGNOSIS — R07.81 RIB PAIN ON LEFT SIDE: ICD-10-CM

## 2017-05-17 PROCEDURE — 99214 OFFICE O/P EST MOD 30 MIN: CPT | Performed by: INTERNAL MEDICINE

## 2017-05-17 RX ORDER — SUMATRIPTAN 50 MG/1
TABLET, FILM COATED ORAL
Qty: 9 TAB | Refills: 6 | Status: SHIPPED | OUTPATIENT
Start: 2017-05-17 | End: 2018-12-17 | Stop reason: SDUPTHER

## 2017-05-17 SDOH — SOCIAL STABILITY - SOCIAL INSECURITY: DISSAPEARANCE AND DEATH OF FAMILY MEMBER: Z63.4

## 2017-05-17 NOTE — MR AVS SNAPSHOT
"        Evelyn Meeks   2017 8:20 AM   Office Visit   MRN: [id number]    Department:  [facility]   Dept Phone:  [phone]    Description:  Female : 1960   Provider:  Segundo Crawford D.O.           Reason for Visit     Follow-Up           Allergies as of 2017     Allergen Noted Reactions    Other Food 2017   Anaphylaxis    Wine coffee    Shellfish Allergy 2012   Anaphylaxis    RXN=whole life    Lidocaine (Anorectal) [Topicaine] 2016   Rash, Itching     patch glue        Pet [Cat Hair Extract] 2013   Hives    RXN=whole life    Pollen Extract 2013   Itching    RXN=whole life      You were diagnosed with     Abnormal CT lung screening   [413671]       Rib pain on left side   [[id number]]       Grief at loss of child   [364174]         Vital Signs     Blood Pressure Pulse Temperature Respirations Height Weight    110/80 mmHg 80 36.9 °C (98.4 °F) 18 1.702 m (5' 7.01\") 60.555 kg (133 lb 8 oz)    Body Mass Index Oxygen Saturation Last Menstrual Period Breastfeeding? Smoking Status       20.90 kg/m2 98% 2002 No Never Smoker        Basic Information     Date Of Birth Sex Race Ethnicity Preferred Language    1960 Female White Non- English      Problem List              ICD-10-CM Priority Class Noted - Resolved    Dyslipidemia E78.5   2011 - Present    Anal fissure K60.2   10/6/2011 - Present    Gastroesophageal reflux disease K21.9   2012 - Present    ADD (attention deficit disorder) F98.8   3/26/2013 - Present    Grief at loss of child F43.21, Z63.4   2015 - Present    Osteoporosis M81.0   1/15/2016 - Present    Other specified hypothyroidism E03.8   2016 - Present    Closed fracture of distal end of right radius S52.501A   8/10/2016 - Present    Thoracic back pain M54.6   2016 - Present    Carpal tunnel syndrome on right G56.01   2017 - Present      Health Maintenance        Date Due Completion Dates    MAMMOGRAM " 9/1/2017 9/1/2016, 9/12/2014, 9/2/2011    COLONOSCOPY 9/3/2022 9/3/2012 (Done)    Override on 9/3/2012: Done ([facility] MD Martell)    IMM DTaP/Tdap/Td Vaccine (2 - Td) 6/19/2025 6/19/2015            Current Immunizations     Influenza TIV (IM) 11/22/2013, 10/2/2012    Influenza Vaccine Quad Inj (Pf) 10/25/2016, 11/18/2015, 10/2/2014    Tdap Vaccine 6/19/2015    Tuberculin Skin Test 8/5/2014, 10/15/2013  8:05 AM, 10/17/2012  9:30 AM, 8/22/2012  9:35 AM, 10/18/2011, 11/3/2010  9:34 AM      Below and/or attached are the medications your provider expects you to take. Review all of your home medications and newly ordered medications with your provider and/or pharmacist. Follow medication instructions as directed by your provider and/or pharmacist. Please keep your medication list with you and share with your provider. Update the information when medications are discontinued, doses are changed, or new medications (including over-the-counter products) are added; and carry medication information at all times in the event of emergency situations     Allergies:  OTHER FOOD - Anaphylaxis     SHELLFISH ALLERGY - Anaphylaxis     LIDOCAINE (ANORECTAL) - Rash,Itching     PET - Hives     POLLEN EXTRACT - Itching               Medications  Valid as of: May 17, 2017 -  8:54 AM    Generic Name Brand Name Tablet Size Instructions for use    Alendronate Sodium (Tab) FOSAMAX 70 MG TAKE 1 TABLET BY MOUTH ONCE WEEKLY BEFORE BREAKFAST, ON AN EMPTY STOMACH: REMAIN UPRIGHT FOR 30 MINUTES        Cholecalciferol (Cap) Vitamin D-3 1000 UNITS Take 2,000 Units by mouth every day.        Gabapentin (Cap) NEURONTIN 300 MG Take 1 Cap by mouth 3 times a day. Start with 300 mg each day for 3 days, then 300 mg bid x 3 days.        Hydrocodone-Acetaminophen (Tab) NORCO 5-325 MG Take 1-2 Tabs by mouth every four hours as needed.        Ibuprofen (Tab) MOTRIN 200 MG Take 600 mg by mouth every four hours as needed for Mild Pain.        Levothyroxine  Sodium (Tab) SYNTHROID 50 MCG TAKE ONE TABLET BY MOUTH EVERY MORNING ON AN EMPTY STOMACH        Loratadine (Tab) CLARITIN 10 MG TAKE ONE TABLET BY MOUTH DAILY        Nitrofurantoin Monohyd Macro (Cap) MACROBID 100 MG Take 1 Cap by mouth 2 times a day.        Pravastatin Sodium (Tab) PRAVACHOL 40 MG Take 1 Tab by mouth every day.        SUMAtriptan Succinate (Tab) IMITREX 50 MG TAKE ONE TABLET BY MOUTH AT ONSET OF HEADACHE; MAY REPEAT ONE TABLET IN 2 HOURS AS NEEDED. MAX OF 2 DOSES A DAY        TraMADol HCl (Tab) ULTRAM 50 MG Take 1 Tab by mouth every four hours as needed for Moderate Pain.        .                 Medicines prescribed today were sent to:     [facility] #[id number] - [facility] [address] [address] [address] [facility] [address]    Phone: [phone] Fax: [phone]    Open 24 Hours?: No      Medication refill instructions:       If your prescription bottle indicates you have medication refills left, it is not necessary to call your provider’s office. Please contact your pharmacy and they will refill your medication.    If your prescription bottle indicates you do not have any refills left, you may request refills at any time through one of the following ways: The online [email or web address] system (except Urgent Care), by calling your provider’s office, or by asking your pharmacy to contact your provider’s office with a refill request. Medication refills are processed only during regular business hours and may not be available until the next business day. Your provider may request additional information or to have a follow-up visit with you prior to refilling your medication.   *Please Note: Medication refills are assigned a new Rx number when refilled electronically. Your pharmacy may indicate that no refills were authorized even though a new prescription for the same medication is available at the pharmacy. Please request the medicine by name with the pharmacy before contacting your provider  for a refill.        Your To Do List     Future Labs/Procedures Complete By Expires    CT-CHEST (THORAX) W/O  As directed 5/17/2018         [employer] Access Code: Activation code not generated  Current [employer] Status: Active

## 2017-05-17 NOTE — PROGRESS NOTES
CC: Evelyn Meeks is a 56 y.o. female is suffering from   Chief Complaint   Patient presents with   • Follow-Up         SUBJECTIVE:  1. Abnormal CT lung screening  Patient's here for follow-up has a history of an abnormal CT scan, was noted to have emphysema on CT. Patient underwent all memory function tests which appear to be entirely normal. Patient does have a pulmonary nodule that should be reimaged. Patient has minimal risk factors for lung cancer.     2. Rib pain on left side  Patient with ongoing left rib pain being treated with acupuncture with good results.     3. Grief at loss of child  Patient is now 2 years out from the death of her son, continues to have problems with grief.         Past social, family, history:   Social History   Substance Use Topics   • Smoking status: Never Smoker    • Smokeless tobacco: Never Used   • Alcohol Use: No         MEDICATIONS:    Current outpatient prescriptions:   •  levothyroxine (SYNTHROID) 50 MCG Tab, TAKE ONE TABLET BY MOUTH EVERY MORNING ON AN EMPTY STOMACH, Disp: 30 Tab, Rfl: 11  •  alendronate (FOSAMAX) 70 MG Tab, TAKE 1 TABLET BY MOUTH ONCE WEEKLY BEFORE BREAKFAST, ON AN EMPTY STOMACH: REMAIN UPRIGHT FOR 30 MINUTES, Disp: 4 Tab, Rfl: 10  •  tramadol (ULTRAM) 50 MG Tab, Take 1 Tab by mouth every four hours as needed for Moderate Pain., Disp: 90 Tab, Rfl: 3  •  gabapentin (NEURONTIN) 300 MG Cap, Take 1 Cap by mouth 3 times a day. Start with 300 mg each day for 3 days, then 300 mg bid x 3 days., Disp: 90 Cap, Rfl: 3  •  Cholecalciferol (VITAMIN D-3) 1000 UNITS Cap, Take 2,000 Units by mouth every day., Disp: , Rfl:   •  ibuprofen (MOTRIN) 200 MG Tab, Take 600 mg by mouth every four hours as needed for Mild Pain., Disp: , Rfl:   •  pravastatin (PRAVACHOL) 40 MG tablet, Take 1 Tab by mouth every day., Disp: 30 Tab, Rfl: 11  •  loratadine (CLARITIN) 10 MG Tab, TAKE ONE TABLET BY MOUTH DAILY, Disp: 30 Tab, Rfl: 11  •  sumatriptan (IMITREX) 50 MG Tab, TAKE ONE  "TABLET BY MOUTH AT ONSET OF HEADACHE; MAY REPEAT ONE TABLET IN 2 HOURS AS NEEDED. MAX OF 2 DOSES A DAY, Disp: 9 Tab, Rfl: 6  •  nitrofurantoin monohydr macro (MACROBID) 100 MG Cap, Take 1 Cap by mouth 2 times a day., Disp: 10 Cap, Rfl: 0  •  hydrocodone-acetaminophen (NORCO) 5-325 MG Tab per tablet, Take 1-2 Tabs by mouth every four hours as needed., Disp: 30 Tab, Rfl: 0    PROBLEMS:  Patient Active Problem List    Diagnosis Date Noted   • Carpal tunnel syndrome on right 01/20/2017   • Thoracic back pain 12/12/2016   • Closed fracture of distal end of right radius 08/10/2016   • Other specified hypothyroidism 07/19/2016   • Osteoporosis 01/15/2016   • Grief at loss of child 08/04/2015   • ADD (attention deficit disorder) 03/26/2013   • Gastroesophageal reflux disease 09/07/2012   • Anal fissure 10/06/2011   • Dyslipidemia 08/19/2011       REVIEW OF SYSTEMS:  Gen.:  No Nausea, Vomiting, fever, Chills.  Heart: No chest pain.  Lungs:  No shortness of Breath.  Psychological: Geo unusual Anxiety depression     PHYSICAL EXAM   Constitutional: Alert, cooperative, not in acute distress.  Cardiovascular:  Rate Rhythm is regular without murmurs rubs clicks.     Thorax & Lungs: Clear to auscultation, no wheezing, rhonchi, or rales  HENT: Normocephalic, Atraumatic.  Eyes: PERRLA, EOMI, Conjunctiva normal.   Neck: Trachia is midline no swelling of the thyroid.   Lymphatic: No lymphadenopathy noted.   Neurologic: Alert & oriented x 3, cranial nerves II through XII are intact, Normal motor function, Normal sensory function, No focal deficits noted.   Psychiatric: Affect normal, Judgment normal, grief at death of her son 2 years prior.     VITAL SIGNS:/80 mmHg  Pulse 80  Temp(Src) 36.9 °C (98.4 °F)  Resp 18  Ht 1.702 m (5' 7.01\")  Wt 60.555 kg (133 lb 8 oz)  BMI 20.90 kg/m2  SpO2 98%  LMP 08/28/2002  Breastfeeding? No    Labs: Reviewed    Assessment:                                                     Plan:    1. " Abnormal CT lung screening  Abnormal CT, CT ordered  - CT-CHEST (THORAX) W/O; Future    2. Rib pain on left side  Rib pain improved with acupuncture    3. Grief at loss of child  Ongoing issues associated with grief with the death of her son 2 years prior

## 2017-05-26 ENCOUNTER — OFFICE VISIT (OUTPATIENT)
Dept: MEDICAL GROUP | Facility: CLINIC | Age: 57
End: 2017-05-26
Payer: COMMERCIAL

## 2017-05-26 VITALS
SYSTOLIC BLOOD PRESSURE: 90 MMHG | OXYGEN SATURATION: 96 % | WEIGHT: 135 LBS | HEIGHT: 67 IN | BODY MASS INDEX: 21.19 KG/M2 | RESPIRATION RATE: 16 BRPM | HEART RATE: 92 BPM | TEMPERATURE: 98.4 F | DIASTOLIC BLOOD PRESSURE: 64 MMHG

## 2017-05-26 DIAGNOSIS — J30.2 SEASONAL ALLERGIC RHINITIS, UNSPECIFIED ALLERGIC RHINITIS TRIGGER: ICD-10-CM

## 2017-05-26 DIAGNOSIS — J40 BRONCHITIS: ICD-10-CM

## 2017-05-26 PROCEDURE — 99213 OFFICE O/P EST LOW 20 MIN: CPT | Performed by: NURSE PRACTITIONER

## 2017-05-26 RX ORDER — AZITHROMYCIN 250 MG/1
TABLET, FILM COATED ORAL
Qty: 6 TAB | Refills: 0 | Status: SHIPPED | OUTPATIENT
Start: 2017-05-26 | End: 2017-06-06

## 2017-05-26 RX ORDER — LORATADINE 10 MG/1
TABLET ORAL
Qty: 30 TAB | Refills: 11 | Status: SHIPPED | OUTPATIENT
Start: 2017-05-26 | End: 2018-04-17 | Stop reason: SDUPTHER

## 2017-05-26 ASSESSMENT — ENCOUNTER SYMPTOMS
WHEEZING: 1
SORE THROAT: 0
CHILLS: 0
COUGH: 1
SHORTNESS OF BREATH: 0
FEVER: 0
SPUTUM PRODUCTION: 0

## 2017-05-26 NOTE — MR AVS SNAPSHOT
"Francinespencer FRANCIS Constantino   2017 2:20 PM   Office Visit   MRN: [id number]    Department:  [facility]   Dept Phone:  [phone]    Description:  Female : 1960   Provider:  JAMES Cedillo           Reason for Visit     URI cough/ sore throat/ sinus pressure x 2 days       Allergies as of 2017     Allergen Noted Reactions    Other Food 2017   Anaphylaxis    Wine coffee    Shellfish Allergy 2012   Anaphylaxis    RXN=whole life    Lidocaine (Anorectal) [Topicaine] 2016   Rash, Itching     patch glue        Pet [Cat Hair Extract] 2013   Hives    RXN=whole life    Pollen Extract 2013   Itching    RXN=whole life      You were diagnosed with     Seasonal allergic rhinitis, unspecified allergic rhinitis trigger   [8799004]       Bronchitis   [[id number]]         Vital Signs     Blood Pressure Pulse Temperature Respirations Height Weight    90/64 mmHg 92 36.9 °C (98.4 °F) 16 1.702 m (5' 7\") 61.236 kg (135 lb)    Body Mass Index Oxygen Saturation Last Menstrual Period Smoking Status          21.14 kg/m2 96% 2002 Never Smoker         Basic Information     Date Of Birth Sex Race Ethnicity Preferred Language    1960 Female White Non- English      Your appointments     May 27, 2017  5:00 PM   CT BODY WO 30 with [facility] CT 1   [facility] [place]    [address]  [place] [address] [phone]           Taking medications as regularly scheduled is strongly encouraged.            Aug 17, 2017  1:00 PM   Established Patient with Segundo Crawford D.O.   [facility])    [address]  [place] [address] [phone]           You will be receiving a confirmation call a few days before your appointment from our automated call confirmation system.              Problem List              ICD-10-CM Priority Class Noted - Resolved    Dyslipidemia E78.5   2011 - Present    Anal fissure " K60.2   10/6/2011 - Present    Gastroesophageal reflux disease K21.9   9/7/2012 - Present    ADD (attention deficit disorder) F98.8   3/26/2013 - Present    Grief at loss of child F43.21, Z63.4   8/4/2015 - Present    Osteoporosis M81.0   1/15/2016 - Present    Other specified hypothyroidism E03.8   7/19/2016 - Present    Closed fracture of distal end of right radius S52.501A   8/10/2016 - Present    Thoracic back pain M54.6   12/12/2016 - Present    Carpal tunnel syndrome on right G56.01   1/20/2017 - Present      Health Maintenance        Date Due Completion Dates    MAMMOGRAM 9/1/2017 9/1/2016, 9/12/2014, 9/2/2011    COLONOSCOPY 9/3/2022 9/3/2012 (Done)    Override on 9/3/2012: Done ([facility] MD Martell)    IMM DTaP/Tdap/Td Vaccine (2 - Td) 6/19/2025 6/19/2015            Current Immunizations     Influenza TIV (IM) 11/22/2013, 10/2/2012    Influenza Vaccine Quad Inj (Pf) 10/25/2016, 11/18/2015, 10/2/2014    Tdap Vaccine 6/19/2015    Tuberculin Skin Test 8/5/2014, 10/15/2013  8:05 AM, 10/17/2012  9:30 AM, 8/22/2012  9:35 AM, 10/18/2011, 11/3/2010  9:34 AM      Below and/or attached are the medications your provider expects you to take. Review all of your home medications and newly ordered medications with your provider and/or pharmacist. Follow medication instructions as directed by your provider and/or pharmacist. Please keep your medication list with you and share with your provider. Update the information when medications are discontinued, doses are changed, or new medications (including over-the-counter products) are added; and carry medication information at all times in the event of emergency situations     Allergies:  OTHER FOOD - Anaphylaxis     SHELLFISH ALLERGY - Anaphylaxis     LIDOCAINE (ANORECTAL) - Rash,Itching     PET - Hives     POLLEN EXTRACT - Itching               Medications  Valid as of: May 26, 2017 -  2:39 PM    Generic Name Brand Name Tablet Size Instructions for use    Alendronate  Sodium (Tab) FOSAMAX 70 MG TAKE 1 TABLET BY MOUTH ONCE WEEKLY BEFORE BREAKFAST, ON AN EMPTY STOMACH: REMAIN UPRIGHT FOR 30 MINUTES        Azithromycin (Tab) ZITHROMAX 250 MG As directed        Cholecalciferol (Cap) Vitamin D-3 1000 UNITS Take 2,000 Units by mouth every day.        Gabapentin (Cap) NEURONTIN 300 MG Take 1 Cap by mouth 3 times a day. Start with 300 mg each day for 3 days, then 300 mg bid x 3 days.        Hydrocodone-Acetaminophen (Tab) NORCO 5-325 MG Take 1-2 Tabs by mouth every four hours as needed.        Ibuprofen (Tab) MOTRIN 200 MG Take 600 mg by mouth every four hours as needed for Mild Pain.        Levothyroxine Sodium (Tab) SYNTHROID 50 MCG TAKE ONE TABLET BY MOUTH EVERY MORNING ON AN EMPTY STOMACH        Loratadine (Tab) CLARITIN 10 MG TAKE ONE TABLET BY MOUTH DAILY        Pravastatin Sodium (Tab) PRAVACHOL 40 MG Take 1 Tab by mouth every day.        SUMAtriptan Succinate (Tab) IMITREX 50 MG TAKE ONE TABLET BY MOUTH AT ONSET OF HEADACHE; MAY REPEAT ONE TABLET IN 2 HOURS AS NEEDED. MAX OF 2 DOSES A DAY        TraMADol HCl (Tab) ULTRAM 50 MG Take 1 Tab by mouth every four hours as needed for Moderate Pain.        .                 Medicines prescribed today were sent to:     [facility] #[id number] - [place] [address] [address]    Phone: [phone] Fax: [phone]    Open 24 Hours?: No      Medication refill instructions:       If your prescription bottle indicates you have medication refills left, it is not necessary to call your provider’s office. Please contact your pharmacy and they will refill your medication.    If your prescription bottle indicates you do not have any refills left, you may request refills at any time through one of the following ways: The online [employer] system (except Urgent Care), by calling your provider’s office, or by asking your pharmacy to contact your provider’s office with a refill request. Medication refills  are processed only during regular business hours and may not be available until the next business day. Your provider may request additional information or to have a follow-up visit with you prior to refilling your medication.   *Please Note: Medication refills are assigned a new Rx number when refilled electronically. Your pharmacy may indicate that no refills were authorized even though a new prescription for the same medication is available at the pharmacy. Please request the medicine by name with the pharmacy before contacting your provider for a refill.           [employer] Access Code: Activation code not generated  Current [employer] Status: Active

## 2017-05-26 NOTE — PROGRESS NOTES
Chief Complaint   Patient presents with   • URI     cough/ sore throat/ sinus pressure x 2 days        HISTORY OF PRESENT ILLNESS: Patient is a 56 y.o. female established patient who presents today due to 2-3 days hx of coughing, mostly dry cough, sore throat, sinus pressure. She reports that her  has same symptoms and has started taking antibiotic for bronchitis. She has allergy and was given claritin but she has not started yet. She is worried her worsening symptoms especially grand kids are coming this weekend. She has no sob but feels wheezy sometimes. She has unexpired inhaler at home but she has not used it.     She was noted to have Hyperexpanded and emphysematous lungs on 11/26 chest ct with normal pulmonary function test. She is also noted to have < 0.6 cm lung nodules which is following with her PCP and scheduled to have follow up chest ct tomorrow.      Patient Active Problem List    Diagnosis Date Noted   • Carpal tunnel syndrome on right 01/20/2017   • Thoracic back pain 12/12/2016   • Closed fracture of distal end of right radius 08/10/2016   • Other specified hypothyroidism 07/19/2016   • Osteoporosis 01/15/2016   • Grief at loss of child 08/04/2015   • ADD (attention deficit disorder) 03/26/2013   • Gastroesophageal reflux disease 09/07/2012   • Anal fissure 10/06/2011   • Dyslipidemia 08/19/2011       Allergies:Other food; Shellfish allergy; Lidocaine (anorectal); Pet; and Pollen extract    Current Outpatient Prescriptions   Medication Sig Dispense Refill   • sumatriptan (IMITREX) 50 MG Tab TAKE ONE TABLET BY MOUTH AT ONSET OF HEADACHE; MAY REPEAT ONE TABLET IN 2 HOURS AS NEEDED. MAX OF 2 DOSES A DAY 9 Tab 6   • levothyroxine (SYNTHROID) 50 MCG Tab TAKE ONE TABLET BY MOUTH EVERY MORNING ON AN EMPTY STOMACH 30 Tab 11   • alendronate (FOSAMAX) 70 MG Tab TAKE 1 TABLET BY MOUTH ONCE WEEKLY BEFORE BREAKFAST, ON AN EMPTY STOMACH: REMAIN UPRIGHT FOR 30 MINUTES 4 Tab 10   • tramadol (ULTRAM) 50 MG  "Tab Take 1 Tab by mouth every four hours as needed for Moderate Pain. 90 Tab 3   • gabapentin (NEURONTIN) 300 MG Cap Take 1 Cap by mouth 3 times a day. Start with 300 mg each day for 3 days, then 300 mg bid x 3 days. 90 Cap 3   • Cholecalciferol (VITAMIN D-3) 1000 UNITS Cap Take 2,000 Units by mouth every day.     • ibuprofen (MOTRIN) 200 MG Tab Take 600 mg by mouth every four hours as needed for Mild Pain.     • pravastatin (PRAVACHOL) 40 MG tablet Take 1 Tab by mouth every day. 30 Tab 11   • nitrofurantoin monohydr macro (MACROBID) 100 MG Cap Take 1 Cap by mouth 2 times a day. 10 Cap 0   • hydrocodone-acetaminophen (NORCO) 5-325 MG Tab per tablet Take 1-2 Tabs by mouth every four hours as needed. 30 Tab 0   • loratadine (CLARITIN) 10 MG Tab TAKE ONE TABLET BY MOUTH DAILY 30 Tab 11     No current facility-administered medications for this visit.       Social History   Substance Use Topics   • Smoking status: Never Smoker    • Smokeless tobacco: Never Used   • Alcohol Use: No       Family Status   Relation Status Death Age   • Mother     • Father     • Son Alive    • Sister Alive    • Brother Alive    • Brother Alive    • Brother Alive    • Brother Alive    • Sister Alive    • Sister Alive    • Son Alive    • Son Alive      Family History   Problem Relation Age of Onset   • Diabetes Mother      complications of DM   • Heart Disease Father      65   • Cancer Son      appendenoma         ROS:  Review of Systems   Constitutional: Negative for fever and chills.   HENT: Positive for congestion. Negative for ear pain and sore throat.    Respiratory: Positive for cough and wheezing. Negative for sputum production and shortness of breath.         Objective:     Blood pressure 90/64, pulse 92, temperature 36.9 °C (98.4 °F), resp. rate 16, height 1.702 m (5' 7\"), weight 61.236 kg (135 lb), last menstrual period 2002, SpO2 96 %.     Physical Exam:  Physical Exam   Constitutional: She is oriented to " person, place, and time and well-developed, well-nourished, and in no distress.   HENT:   Head: Normocephalic and atraumatic.   Right Ear: External ear normal.   Left Ear: External ear normal.   Nose: Nose normal.   Mouth/Throat: Oropharynx is clear and moist. No oropharyngeal exudate.   Eyes: Conjunctivae are normal.   Neck: Neck supple. No JVD present.   Cardiovascular: Normal rate.    Pulmonary/Chest: Effort normal. No respiratory distress. She has no wheezes. She has no rales.   Somewhat diminished overall   Neurological: She is alert and oriented to person, place, and time.   Vitals reviewed.        Assessment/Plan:  1. Seasonal allergic rhinitis, unspecified allergic rhinitis trigger  - loratadine (CLARITIN) 10 MG Tab; TAKE ONE TABLET BY MOUTH DAILY  Dispense: 30 Tab; Refill: 11    2. Bronchitis  - azithromycin (ZITHROMAX) 250 MG Tab; As directed  Dispense: 6 Tab; Refill: 0  - can take OTC coughing medication for symptom management. Stay hydrated, take plenty of rest. Humidifier at night time.   - Follow up chest ct scheduled tomorrow. Follow up with PCP for result and lung nodules.   - Home inhaler as needed. Instructed pt to check the expiration date to make sure. Call us if prescription needed.     Differential diagnoses and indications for immediate follow-up discussed with patient.    Instructed to return to clinic or nearest emergency department for any change in condition, further concerns, or worsening of symptoms.    The patient demonstrated a good understanding and agreed with the treatment plan.

## 2017-05-27 ENCOUNTER — APPOINTMENT (OUTPATIENT)
Dept: RADIOLOGY | Facility: MEDICAL CENTER | Age: 57
End: 2017-05-27
Attending: INTERNAL MEDICINE
Payer: COMMERCIAL

## 2017-06-05 DIAGNOSIS — R19.7 DIARRHEA, UNSPECIFIED TYPE: ICD-10-CM

## 2017-06-06 ENCOUNTER — HOSPITAL ENCOUNTER (EMERGENCY)
Facility: MEDICAL CENTER | Age: 57
End: 2017-06-06
Attending: EMERGENCY MEDICINE
Payer: COMMERCIAL

## 2017-06-06 ENCOUNTER — APPOINTMENT (OUTPATIENT)
Dept: RADIOLOGY | Facility: MEDICAL CENTER | Age: 57
End: 2017-06-06
Attending: EMERGENCY MEDICINE
Payer: COMMERCIAL

## 2017-06-06 VITALS
BODY MASS INDEX: 19.34 KG/M2 | DIASTOLIC BLOOD PRESSURE: 70 MMHG | TEMPERATURE: 96.8 F | RESPIRATION RATE: 16 BRPM | HEIGHT: 67 IN | HEART RATE: 63 BPM | OXYGEN SATURATION: 97 % | WEIGHT: 123.24 LBS | SYSTOLIC BLOOD PRESSURE: 111 MMHG

## 2017-06-06 DIAGNOSIS — R19.7 DIARRHEA, UNSPECIFIED TYPE: ICD-10-CM

## 2017-06-06 DIAGNOSIS — E87.6 HYPOKALEMIA: ICD-10-CM

## 2017-06-06 DIAGNOSIS — E86.0 DEHYDRATION: ICD-10-CM

## 2017-06-06 DIAGNOSIS — R11.2 NON-INTRACTABLE VOMITING WITH NAUSEA, UNSPECIFIED VOMITING TYPE: ICD-10-CM

## 2017-06-06 LAB
ALBUMIN SERPL BCP-MCNC: 4.4 G/DL (ref 3.2–4.9)
ALBUMIN/GLOB SERPL: 1.7 G/DL
ALP SERPL-CCNC: 97 U/L (ref 30–99)
ALT SERPL-CCNC: 27 U/L (ref 2–50)
ANION GAP SERPL CALC-SCNC: 11 MMOL/L (ref 0–11.9)
APPEARANCE UR: CLEAR
AST SERPL-CCNC: 23 U/L (ref 12–45)
BASOPHILS # BLD AUTO: 0.1 % (ref 0–1.8)
BASOPHILS # BLD: 0.01 K/UL (ref 0–0.12)
BILIRUB SERPL-MCNC: 1.2 MG/DL (ref 0.1–1.5)
BLOOD CULTURE HOLD CXBCH: NORMAL
BUN SERPL-MCNC: 24 MG/DL (ref 8–22)
CALCIUM SERPL-MCNC: 9.1 MG/DL (ref 8.5–10.5)
CHLORIDE SERPL-SCNC: 100 MMOL/L (ref 96–112)
CO2 SERPL-SCNC: 28 MMOL/L (ref 20–33)
COLOR UR AUTO: YELLOW
COMMENT 1642: NORMAL
CREAT SERPL-MCNC: 0.74 MG/DL (ref 0.5–1.4)
EOSINOPHIL # BLD AUTO: 0.06 K/UL (ref 0–0.51)
EOSINOPHIL NFR BLD: 0.5 % (ref 0–6.9)
ERYTHROCYTE [DISTWIDTH] IN BLOOD BY AUTOMATED COUNT: 39.1 FL (ref 35.9–50)
GFR SERPL CREATININE-BSD FRML MDRD: >60 ML/MIN/1.73 M 2
GLOBULIN SER CALC-MCNC: 2.6 G/DL (ref 1.9–3.5)
GLUCOSE SERPL-MCNC: 128 MG/DL (ref 65–99)
GLUCOSE UR QL STRIP.AUTO: NEGATIVE MG/DL
HCT VFR BLD AUTO: 47.2 % (ref 37–47)
HGB BLD-MCNC: 16.4 G/DL (ref 12–16)
KETONES UR QL STRIP.AUTO: 15 MG/DL
LEUKOCYTE ESTERASE UR QL STRIP.AUTO: NEGATIVE
LIPASE SERPL-CCNC: 27 U/L (ref 11–82)
LYMPHOCYTES # BLD AUTO: 1.45 K/UL (ref 1–4.8)
LYMPHOCYTES NFR BLD: 13 % (ref 22–41)
MCH RBC QN AUTO: 28.7 PG (ref 27–33)
MCHC RBC AUTO-ENTMCNC: 34.7 G/DL (ref 33.6–35)
MCV RBC AUTO: 82.7 FL (ref 81.4–97.8)
MONOCYTES # BLD AUTO: 1 K/UL (ref 0–0.85)
MONOCYTES NFR BLD AUTO: 8.9 % (ref 0–13.4)
MORPHOLOGY BLD-IMP: NORMAL
NEUTROPHILS # BLD AUTO: 8.66 K/UL (ref 2–7.15)
NEUTROPHILS NFR BLD: 77.5 % (ref 44–72)
NITRITE UR QL STRIP.AUTO: NEGATIVE
PH UR STRIP.AUTO: 6.5 [PH]
PLATELET # BLD AUTO: 200 K/UL (ref 164–446)
PMV BLD AUTO: 12.7 FL (ref 9–12.9)
POTASSIUM SERPL-SCNC: 2.9 MMOL/L (ref 3.6–5.5)
PROT SERPL-MCNC: 7 G/DL (ref 6–8.2)
PROT UR QL STRIP: 30 MG/DL
RBC # BLD AUTO: 5.71 M/UL (ref 4.2–5.4)
RBC UR QL AUTO: NEGATIVE
SODIUM SERPL-SCNC: 139 MMOL/L (ref 135–145)
SP GR UR: 1.02
WBC # BLD AUTO: 11.2 K/UL (ref 4.8–10.8)

## 2017-06-06 PROCEDURE — 700117 HCHG RX CONTRAST REV CODE 255: Performed by: EMERGENCY MEDICINE

## 2017-06-06 PROCEDURE — 700105 HCHG RX REV CODE 258: Performed by: EMERGENCY MEDICINE

## 2017-06-06 PROCEDURE — 80053 COMPREHEN METABOLIC PANEL: CPT

## 2017-06-06 PROCEDURE — 700102 HCHG RX REV CODE 250 W/ 637 OVERRIDE(OP): Performed by: EMERGENCY MEDICINE

## 2017-06-06 PROCEDURE — 81002 URINALYSIS NONAUTO W/O SCOPE: CPT

## 2017-06-06 PROCEDURE — 96360 HYDRATION IV INFUSION INIT: CPT

## 2017-06-06 PROCEDURE — 36415 COLL VENOUS BLD VENIPUNCTURE: CPT

## 2017-06-06 PROCEDURE — 74177 CT ABD & PELVIS W/CONTRAST: CPT

## 2017-06-06 PROCEDURE — 85025 COMPLETE CBC W/AUTO DIFF WBC: CPT

## 2017-06-06 PROCEDURE — 99284 EMERGENCY DEPT VISIT MOD MDM: CPT

## 2017-06-06 PROCEDURE — 83690 ASSAY OF LIPASE: CPT

## 2017-06-06 PROCEDURE — 96361 HYDRATE IV INFUSION ADD-ON: CPT

## 2017-06-06 PROCEDURE — A9270 NON-COVERED ITEM OR SERVICE: HCPCS | Performed by: EMERGENCY MEDICINE

## 2017-06-06 RX ORDER — POTASSIUM CHLORIDE 1.5 G/1.58G
20 POWDER, FOR SOLUTION ORAL DAILY
Qty: 11 PACKET | Refills: 0 | Status: SHIPPED | OUTPATIENT
Start: 2017-06-06 | End: 2017-06-17

## 2017-06-06 RX ORDER — ALENDRONATE SODIUM 70 MG/1
70 TABLET ORAL
COMMUNITY
End: 2020-01-12

## 2017-06-06 RX ORDER — SODIUM CHLORIDE 9 MG/ML
INJECTION, SOLUTION INTRAVENOUS CONTINUOUS
Status: DISCONTINUED | OUTPATIENT
Start: 2017-06-06 | End: 2017-06-06 | Stop reason: HOSPADM

## 2017-06-06 RX ORDER — METRONIDAZOLE 500 MG/1
250 TABLET ORAL EVERY 6 HOURS
Qty: 20 TAB | Refills: 0 | Status: SHIPPED | OUTPATIENT
Start: 2017-06-06 | End: 2017-06-16

## 2017-06-06 RX ORDER — ONDANSETRON 4 MG/1
4 TABLET, FILM COATED ORAL EVERY 8 HOURS PRN
Qty: 6 EACH | Refills: 2 | Status: SHIPPED | OUTPATIENT
Start: 2017-06-06 | End: 2019-11-15

## 2017-06-06 RX ORDER — GABAPENTIN 300 MG/1
300 CAPSULE ORAL 2 TIMES DAILY
COMMUNITY
End: 2017-09-27 | Stop reason: SDUPTHER

## 2017-06-06 RX ORDER — AZITHROMYCIN 250 MG/1
250-500 TABLET, FILM COATED ORAL DAILY
COMMUNITY
Start: 2017-05-26 | End: 2017-11-20

## 2017-06-06 RX ORDER — POTASSIUM CHLORIDE 20 MEQ/1
40 TABLET, EXTENDED RELEASE ORAL ONCE
Status: COMPLETED | OUTPATIENT
Start: 2017-06-06 | End: 2017-06-06

## 2017-06-06 RX ADMIN — POTASSIUM CHLORIDE 40 MEQ: 1500 TABLET, EXTENDED RELEASE ORAL at 11:09

## 2017-06-06 RX ADMIN — SODIUM CHLORIDE: 9 INJECTION, SOLUTION INTRAVENOUS at 08:45

## 2017-06-06 RX ADMIN — IOHEXOL 50 ML: 240 INJECTION, SOLUTION INTRATHECAL; INTRAVASCULAR; INTRAVENOUS; ORAL at 10:03

## 2017-06-06 RX ADMIN — IOHEXOL 100 ML: 350 INJECTION, SOLUTION INTRAVENOUS at 10:01

## 2017-06-06 ASSESSMENT — LIFESTYLE VARIABLES: DO YOU DRINK ALCOHOL: NO

## 2017-06-06 ASSESSMENT — PAIN SCALES - GENERAL
PAINLEVEL_OUTOF10: 5
PAINLEVEL_OUTOF10: 5

## 2017-06-06 NOTE — ED PROVIDER NOTES
ED Provider Note    CHIEF COMPLAINT  Chief Complaint   Patient presents with   • Nausea/Vomiting/Diarrhea     x9 days   • Dizziness     x9 days   • Cough     productive, green/white mucous   • Bloody Stools     x2 days       HPI  Evelyn Meeks is a 56 y.o. female who presents with nausea and vomiting 2 days ago, last vomiting, this morning.. History of diarrhea, for 9 days, several stools a day, watery, small amount of bloody stools for the last 2 days,. Small amount of blood this morning. No other. Occasional cramps with diarrhea. No fever or chills. Evidently placed on erythromycin, 26 of May for bronchitis. Bronchitis is clearing up but not diarrhea.    REVIEW OF SYSTEMS  See HPI for further details. History of hemorrhoids kidney stones elevated lipase and history are GERD, urinary tract infection and pneumonia migraine headaches.Denies other G.I., G.U.. endrocine, cardiovascular, respriatory or neurological problems.  All other systems are negative.     PAST MEDICAL HISTORY  Past Medical History   Diagnosis Date   • Hemorrhoid    • Kidney stone    • Dyslipidemia 8/19/2011   • Anal fissure 10/6/2011   • Gastroesophageal reflux disease 9/7/2012   • No pertinent past medical history    • Grief at loss of child 8/4/2015   • Osteoporosis 1/15/2016   • Measles      as child   • Mumps      as child   • Chicken pox      as child   • UTI (lower urinary tract infection) 2014   • High cholesterol    • Bronchitis 2015   • Pneumonia 2015   • Disorder of thyroid    • Back pain      left rib pain from fracture 1/2016   • Migraine        FAMILY HISTORY  Family History   Problem Relation Age of Onset   • Diabetes Mother      complications of DM   • Heart Disease Father      65   • Cancer Son      appendenoma       SOCIAL HISTORY she is a nonsmoker  Social History     Social History   • Marital Status:      Spouse Name: N/A   • Number of Children: N/A   • Years of Education: N/A     Social History Main Topics   •  Smoking status: Never Smoker    • Smokeless tobacco: Never Used   • Alcohol Use: No   • Drug Use: No   • Sexual Activity:     Partners: Male     Other Topics Concern   • None     Social History Narrative    ** Merged History Encounter **            SURGICAL HISTORY  Past Surgical History   Procedure Laterality Date   • Tonsillectomy     • Hemorrhoidectomy     • Wrist orif Right 8/10/2016     Procedure: WRIST ORIF;  Surgeon: Jono Maynard M.D.;  Location: SURGERY [facility];  Service:    • Pr inject nerv blck,intercost,multpl  12/12/2016     Procedure: INJ-INTERCOSTAL MULTIPLE - T9, T10;  Surgeon: Rafat Nichols D.O.;  Location: [facility];  Service: Pain Management   • Abdominal hysterectomy total     • Gyn surgery       c sections x 2   • Gyn surgery       multiple D&C's   • Carpal tunnel release Right 1/20/2017     Procedure: CARPAL TUNNEL RELEASE;  Surgeon: Jono Maynard M.D.;  Location: SURGERY [facility];  Service:        CURRENT MEDICATIONS  Home Medications     Reviewed by Monica Peters R.N. (Registered Nurse) on 06/06/17 at 0757  Med List Status: Complete    Medication Last Dose Status    alendronate (FOSAMAX) 70 MG Tab 6/1/2017 Active    Cholecalciferol (VITAMIN D-3) 1000 UNITS Cap 6/5/2017 Active    gabapentin (NEURONTIN) 300 MG Cap 6/5/2017 Active    ibuprofen (MOTRIN) 200 MG Tab PRN Active    levothyroxine (SYNTHROID) 50 MCG Tab 6/5/2017 Active    loratadine (CLARITIN) 10 MG Tab 6/5/2017 Active    pravastatin (PRAVACHOL) 40 MG tablet 6/5/2017 Active    sumatriptan (IMITREX) 50 MG Tab PRN Active    tramadol (ULTRAM) 50 MG Tab PRN Active                ALLERGIES  Allergies   Allergen Reactions   • Other Food Anaphylaxis     Wine coffee   • Shellfish Allergy Anaphylaxis     RXN=whole life   • Lidocaine (Anorectal) [Topicaine] Rash and Itching      patch glue       • Pet [Cat Hair Extract] Hives     RXN=whole life   • Pollen Extract Itching     RXN=whole life  "      PHYSICAL EXAM  VITAL SIGNS: /70 mmHg  Pulse 64  Temp(Src) 36 °C (96.8 °F)  Resp 16  Ht 1.702 m (5' 7\")  Wt 55.9 kg (123 lb 3.8 oz)  BMI 19.30 kg/m2  SpO2 98%  LMP 08/28/2002  Constitutional: Well developed, Well nourished, No acute distress, Non-toxic appearance.   HENT: Normocephalic, Atraumatic, Bilateral external ears normal, Oropharynx moist, No oral exudates, Nose normal.   Eyes: PERRL, EOMI, Conjunctiva normal, No discharge.   Neck: Normal range of motion, No tenderness, Supple, No stridor.   Lymphatic: No lymphadenopathy noted.   Cardiovascular: Normal heart rate, Normal rhythm, No murmurs, No rubs, No gallops.   Thorax & Lungs: Normal breath sounds, No respiratory distress, No wheezing, No chest tenderness.   Abdomen:  No tenderness, no guarding no rigidity and the abdomen is soft.  No masses, No pulsatile masses.  Skin: Warm, Dry, No erythema, No rash.   Back: No tenderness, No CVA tenderness.   Extremities: Intact distal pulses, No edema, No tenderness, No cyanosis, No clubbing.   Musculoskeletal: Good range of motion in all major joints. No tenderness to palpation or major deformities noted.   Neurologic: Alert & oriented x 3, Normal motor function, Normal sensory function, No focal deficits noted.   Psychiatric: Affect normal, Judgment normal, Mood normal.       RADIOLOGY/PROCEDURES  CT-ABDOMEN-PELVIS WITH   Final Result      No acute intra-abdominal abnormality is seen.      Evaluation of the colon is limited by relative under distention. No surrounding inflammatory changes are identified but mild wall thickening is difficult to exclude.      Colonic diverticulosis.      Hepatic lesion previously noted to be compatible with a hemangioma.      Nonobstructing right renal calculus.      Atherosclerotic plaque.               COURSE & MEDICAL DECISION MAKING  Pertinent Labs & Imaging studies reviewed. (See chart for details) urinalysis normal, white count elevated 11.2 hematocrit 47 " platelet count normal., Electrolytes, potassium low at 2.9 and an elevated creatinine normal lipase is normal, urinalysis, no evidence of infection.        She has been complaining of vomiting for 2 days diarrhea for 9 days, blood in her sclera last 2 days.  No fever no chills minimal abdominal pain. CAT scan does not demonstrate any acute abnormalities. She has recently been on erythromycin for bronchitis, concern for C. difficile. I will talk with her gastroenterologist about further disposition.    I have talked with [facility], Dr. Davis who feels that probably reasonable to place her on Flagyl. She is just finished erythromycin now diarrhea occasionally with blood. Concern for C. difficile. We have asked the lab for C. difficile. I'm going to place her on Flagyl, Zofran for any nausea, some potassium supplement   This patient understands that abdominal pain may be very elusive. At this point there is no evidence of an acute abdominal emergency. However if the pain returns or is no better in 12 hours or any vomiting they should return to the emergency room immediately. Just because the lab and x-rays are normal does not rule out a severe abdominal emergency  FINAL IMPRESSION  1.   1. Non-intractable vomiting with nausea, unspecified vomiting type    2. Diarrhea, unspecified type    3. Dehydration      2. Hypokalemia  3.     Disposition  Discharge instructions are understood. This patient is to return if fever vomiting or no better in 12 hours. Follow up with the [facility] clinic or private physician. Information sheets on hypokalemia, vomiting, diarrhea, C. difficile  Electronically signed by: Justin Montoya, 6/6/2017 8:05 AM

## 2017-06-06 NOTE — ED NOTES
Pt resting comfortably, states that she feels light headed. Awaiting a return call from Dr. Hannon.

## 2017-06-06 NOTE — ED AVS SNAPSHOT
6/6/2017    Evelyn Meeks  [address] [facility] [address]    Dear Evelyn:    [facility] wants to ensure your discharge home is safe and you or your loved ones have had all of your questions answered regarding your care after you leave the hospital.    Below is a list of resources and contact information should you have any questions regarding your hospital stay, follow-up instructions, or active medical symptoms.    Questions or Concerns Regarding… Contact   Medical Questions Related to Your Discharge  (7 days a week, 8am-5pm) Contact a Nurse Care Coordinator   [phone]   Medical Questions Not Related to Your Discharge  (24 hours a day / 7 days a week)  Contact the Nurse Health Line   [phone]    Medications or Discharge Instructions Refer to your discharge packet   or contact your [facility] Primary Care Provider   [phone]   Follow-up Appointment(s) Schedule your appointment via [employer]   or contact Scheduling [phone]   Billing Review your statement via [employer]  or contact Billing [phone]   Medical Records Review your records via [employer]   or contact Medical Records [phone]     You may receive a telephone call within two days of discharge. This call is to make certain you understand your discharge instructions and have the opportunity to have any questions answered. You can also easily access your medical information, test results and upcoming appointments via the [employer] free online health management tool. You can learn more and sign up at [employer]. For assistance setting up your [employer] account, please call [phone].    Once again, we want to ensure your discharge home is safe and that you have a clear understanding of any next steps in your care. If you have any questions or concerns, please do not hesitate to contact us, we are here for you. Thank you for choosing [facility] for your healthcare needs.    Sincerely,    Your [facility] Healthcare Team

## 2017-06-06 NOTE — ED NOTES
Med rec complete per Pt at bedside  Allergies reviewed  Pt completed a 5 day course of Azithromycin on 5/30

## 2017-06-06 NOTE — ED NOTES
Pt ambulatory to red 12, changing into gown, chart up for ERP. Pt noted with frequent dry cough and appears tired.

## 2017-06-06 NOTE — DISCHARGE INSTRUCTIONS
Diarrhea  Diarrhea is frequent loose and watery bowel movements. It can cause you to feel weak and dehydrated. Dehydration can cause you to become tired and thirsty, have a dry mouth, and have decreased urination that often is dark yellow. Diarrhea is a sign of another problem, most often an infection that will not last long. In most cases, diarrhea typically lasts 2-3 days. However, it can last longer if it is a sign of something more serious. It is important to treat your diarrhea as directed by your caregiver to lessen or prevent future episodes of diarrhea.  CAUSES   Some common causes include:  · Gastrointestinal infections caused by viruses, bacteria, or parasites.  · Food poisoning or food allergies.  · Certain medicines, such as antibiotics, chemotherapy, and laxatives.  · Artificial sweeteners and fructose.  · Digestive disorders.  HOME CARE INSTRUCTIONS  · Ensure adequate fluid intake (hydration): Have 1 cup (8 oz) of fluid for each diarrhea episode. Avoid fluids that contain simple sugars or sports drinks, fruit juices, whole milk products, and sodas. Your urine should be clear or pale yellow if you are drinking enough fluids. Hydrate with an oral rehydration solution that you can purchase at pharmacies, retail stores, and online. You can prepare an oral rehydration solution at home by mixing the following ingredients together:  ¨  - tsp table salt.  ¨ ¾ tsp baking soda.  ¨  tsp salt substitute containing potassium chloride.  ¨ 1  tablespoons sugar.  ¨ 1 L (34 oz) of water.  · Certain foods and beverages may increase the speed at which food moves through the gastrointestinal (GI) tract. These foods and beverages should be avoided and include:  ¨ Caffeinated and alcoholic beverages.  ¨ High-fiber foods, such as raw fruits and vegetables, nuts, seeds, and whole grain breads and cereals.  ¨ Foods and beverages sweetened with sugar alcohols, such as xylitol, sorbitol, and mannitol.  · Some foods may be well  tolerated and may help thicken stool including:  ¨ Starchy foods, such as rice, toast, pasta, low-sugar cereal, oatmeal, grits, baked potatoes, crackers, and bagels.  ¨ Bananas.  ¨ Applesauce.  · Add probiotic-rich foods to help increase healthy bacteria in the GI tract, such as yogurt and fermented milk products.  · Wash your hands well after each diarrhea episode.  · Only take over-the-counter or prescription medicines as directed by your caregiver.  · Take a warm bath to relieve any burning or pain from frequent diarrhea episodes.  SEEK IMMEDIATE MEDICAL CARE IF:   · You are unable to keep fluids down.  · You have persistent vomiting.  · You have blood in your stool, or your stools are black and tarry.  · You do not urinate in 6-8 hours, or there is only a small amount of very dark urine.  · You have abdominal pain that increases or localizes.  · You have weakness, dizziness, confusion, or light-headedness.  · You have a severe headache.  · Your diarrhea gets worse or does not get better.  · You have a fever or persistent symptoms for more than 2-3 days.  · You have a fever and your symptoms suddenly get worse.  MAKE SURE YOU:   · Understand these instructions.  · Will watch your condition.  · Will get help right away if you are not doing well or get worse.     This information is not intended to replace advice given to you by your health care provider. Make sure you discuss any questions you have with your health care provider.     Document Released: 12/08/2003 Document Revised: 01/08/2016 Document Reviewed: 08/25/2013  [employer] Interactive Patient Education ©2016 [employer]    Dehydration, Adult  Dehydration is when you lose more fluids from the body than you take in. Vital organs like the kidneys, brain, and heart cannot function without a proper amount of fluids and salt. Any loss of fluids from the body can cause dehydration.   CAUSES   · Vomiting.  · Diarrhea.  · Excessive sweating.  · Excessive urine  output.  · Fever.  SYMPTOMS   Mild dehydration  · Thirst.  · Dry lips.  · Slightly dry mouth.  Moderate dehydration  · Very dry mouth.  · Sunken eyes.  · Skin does not bounce back quickly when lightly pinched and released.  · Dark urine and decreased urine production.  · Decreased tear production.  · Headache.  Severe dehydration  · Very dry mouth.  · Extreme thirst.  · Rapid, weak pulse (more than 100 beats per minute at rest).  · Cold hands and feet.  · Not able to sweat in spite of heat and temperature.  · Rapid breathing.  · Blue lips.  · Confusion and lethargy.  · Difficulty being awakened.  · Minimal urine production.  · No tears.  DIAGNOSIS   Your caregiver will diagnose dehydration based on your symptoms and your exam. Blood and urine tests will help confirm the diagnosis. The diagnostic evaluation should also identify the cause of dehydration.  TREATMENT   Treatment of mild or moderate dehydration can often be done at home by increasing the amount of fluids that you drink. It is best to drink small amounts of fluid more often. Drinking too much at one time can make vomiting worse. Refer to the home care instructions below.  Severe dehydration needs to be treated at the hospital where you will probably be given intravenous (IV) fluids that contain water and electrolytes.  HOME CARE INSTRUCTIONS   · Ask your caregiver about specific rehydration instructions.  · Drink enough fluids to keep your urine clear or pale yellow.  · Drink small amounts frequently if you have nausea and vomiting.  · Eat as you normally do.  · Avoid:  · Foods or drinks high in sugar.  · Carbonated drinks.  · Juice.  · Extremely hot or cold fluids.  · Drinks with caffeine.  · Fatty, greasy foods.  · Alcohol.  · Tobacco.  · Overeating.  · Gelatin desserts.  · Wash your hands well to avoid spreading bacteria and viruses.  · Only take over-the-counter or prescription medicines for pain, discomfort, or fever as directed by your  caregiver.  · Ask your caregiver if you should continue all prescribed and over-the-counter medicines.  · Keep all follow-up appointments with your caregiver.  SEEK MEDICAL CARE IF:  · You have abdominal pain and it increases or stays in one area (localizes).  · You have a rash, stiff neck, or severe headache.  · You are irritable, sleepy, or difficult to awaken.  · You are weak, dizzy, or extremely thirsty.  SEEK IMMEDIATE MEDICAL CARE IF:   · You are unable to keep fluids down or you get worse despite treatment.  · You have frequent episodes of vomiting or diarrhea.  · You have blood or green matter (bile) in your vomit.  · You have blood in your stool or your stool looks black and tarry.  · You have not urinated in 6 to 8 hours, or you have only urinated a small amount of very dark urine.  · You have a fever.  · You faint.  MAKE SURE YOU:   · Understand these instructions.  · Will watch your condition.  · Will get help right away if you are not doing well or get worse.     This information is not intended to replace advice given to you by your health care provider. Make sure you discuss any questions you have with your health care provider.     Document Released: 12/18/2006 Document Revised: 03/11/2013 Document Reviewed: 08/06/2012  [employer] Interactive Patient Education ©2016 [employer]    Hypokalemia  Hypokalemia means that the amount of potassium in the blood is lower than normal. Potassium is a chemical, called an electrolyte, that helps regulate the amount of fluid in the body. It also stimulates muscle contraction and helps nerves function properly. Most of the body's potassium is inside of cells, and only a very small amount is in the blood. Because the amount in the blood is so small, minor changes can be life-threatening.  CAUSES  · Antibiotics.  · Diarrhea or vomiting.  · Using laxatives too much, which can cause diarrhea.  · Chronic kidney disease.  · Water pills (diuretics).  · Eating disorders  (bulimia).  · Low magnesium level.  · Sweating a lot.  SIGNS AND SYMPTOMS  · Weakness.  · Constipation.  · Fatigue.  · Muscle cramps.  · Mental confusion.  · Skipped heartbeats or irregular heartbeat (palpitations).  · Tingling or numbness.  DIAGNOSIS   Your health care provider can diagnose hypokalemia with blood tests. In addition to checking your potassium level, your health care provider may also check other lab tests.  TREATMENT  Hypokalemia can be treated with potassium supplements taken by mouth or adjustments in your current medicines. If your potassium level is very low, you may need to get potassium through a vein (IV) and be monitored in the hospital. A diet high in potassium is also helpful. Foods high in potassium are:  · Nuts, such as peanuts and pistachios.  · Seeds, such as sunflower seeds and pumpkin seeds.  · Peas, lentils, and lima beans.  · Whole grain and bran cereals and breads.  · Fresh fruit and vegetables, such as apricots, avocado, bananas, cantaloupe, kiwi, oranges, tomatoes, asparagus, and potatoes.  · Orange and tomato juices.  · Red meats.  · Fruit yogurt.  HOME CARE INSTRUCTIONS  · Take all medicines as prescribed by your health care provider.  · Maintain a healthy diet by including nutritious food, such as fruits, vegetables, nuts, whole grains, and lean meats.  · If you are taking a laxative, be sure to follow the directions on the label.  SEEK MEDICAL CARE IF:  · Your weakness gets worse.  · You feel your heart pounding or racing.  · You are vomiting or having diarrhea.  · You are diabetic and having trouble keeping your blood glucose in the normal range.  SEEK IMMEDIATE MEDICAL CARE IF:  · You have chest pain, shortness of breath, or dizziness.  · You are vomiting or having diarrhea for more than 2 days.  · You faint.  MAKE SURE YOU:   · Understand these instructions.  · Will watch your condition.  · Will get help right away if you are not doing well or get worse.     This  information is not intended to replace advice given to you by your health care provider. Make sure you discuss any questions you have with your health care provider.     Document Released: 12/18/2006 Document Revised: 01/08/2016 Document Reviewed: 06/20/2014  [employer] Interactive Patient Education ©2016 [employer]    Hypokalemia  Hypokalemia means that the amount of potassium in the blood is lower than normal. Potassium is a chemical, called an electrolyte, that helps regulate the amount of fluid in the body. It also stimulates muscle contraction and helps nerves function properly. Most of the body's potassium is inside of cells, and only a very small amount is in the blood. Because the amount in the blood is so small, minor changes can be life-threatening.  CAUSES  · Antibiotics.  · Diarrhea or vomiting.  · Using laxatives too much, which can cause diarrhea.  · Chronic kidney disease.  · Water pills (diuretics).  · Eating disorders (bulimia).  · Low magnesium level.  · Sweating a lot.  SIGNS AND SYMPTOMS  · Weakness.  · Constipation.  · Fatigue.  · Muscle cramps.  · Mental confusion.  · Skipped heartbeats or irregular heartbeat (palpitations).  · Tingling or numbness.  DIAGNOSIS   Your health care provider can diagnose hypokalemia with blood tests. In addition to checking your potassium level, your health care provider may also check other lab tests.  TREATMENT  Hypokalemia can be treated with potassium supplements taken by mouth or adjustments in your current medicines. If your potassium level is very low, you may need to get potassium through a vein (IV) and be monitored in the hospital. A diet high in potassium is also helpful. Foods high in potassium are:  · Nuts, such as peanuts and pistachios.  · Seeds, such as sunflower seeds and pumpkin seeds.  · Peas, lentils, and lima beans.  · Whole grain and bran cereals and breads.  · Fresh fruit and vegetables, such as apricots, avocado, bananas, cantaloupe, kiwi,  oranges, tomatoes, asparagus, and potatoes.  · Orange and tomato juices.  · Red meats.  · Fruit yogurt.  HOME CARE INSTRUCTIONS  · Take all medicines as prescribed by your health care provider.  · Maintain a healthy diet by including nutritious food, such as fruits, vegetables, nuts, whole grains, and lean meats.  · If you are taking a laxative, be sure to follow the directions on the label.  SEEK MEDICAL CARE IF:  · Your weakness gets worse.  · You feel your heart pounding or racing.  · You are vomiting or having diarrhea.  · You are diabetic and having trouble keeping your blood glucose in the normal range.  SEEK IMMEDIATE MEDICAL CARE IF:  · You have chest pain, shortness of breath, or dizziness.  · You are vomiting or having diarrhea for more than 2 days.  · You faint.  MAKE SURE YOU:   · Understand these instructions.  · Will watch your condition.  · Will get help right away if you are not doing well or get worse.     This information is not intended to replace advice given to you by your health care provider. Make sure you discuss any questions you have with your health care provider.     Document Released: 12/18/2006 Document Revised: 01/08/2016 Document Reviewed: 06/20/2014  [employer] Interactive Patient Education ©2016 [employer]    Nausea and Vomiting  Nausea is a sick feeling that often comes before throwing up (vomiting). Vomiting is a reflex where stomach contents come out of your mouth. Vomiting can cause severe loss of body fluids (dehydration). Children and elderly adults can become dehydrated quickly, especially if they also have diarrhea. Nausea and vomiting are symptoms of a condition or disease. It is important to find the cause of your symptoms.  CAUSES   · Direct irritation of the stomach lining. This irritation can result from increased acid production (gastroesophageal reflux disease), infection, food poisoning, taking certain medicines (such as nonsteroidal anti-inflammatory drugs),  alcohol use, or tobacco use.  · Signals from the brain. These signals could be caused by a headache, heat exposure, an inner ear disturbance, increased pressure in the brain from injury, infection, a tumor, or a concussion, pain, emotional stimulus, or metabolic problems.  · An obstruction in the gastrointestinal tract (bowel obstruction).  · Illnesses such as diabetes, hepatitis, gallbladder problems, appendicitis, kidney problems, cancer, sepsis, atypical symptoms of a heart attack, or eating disorders.  · Medical treatments such as chemotherapy and radiation.  · Receiving medicine that makes you sleep (general anesthetic) during surgery.  DIAGNOSIS  Your caregiver may ask for tests to be done if the problems do not improve after a few days. Tests may also be done if symptoms are severe or if the reason for the nausea and vomiting is not clear. Tests may include:  · Urine tests.  · Blood tests.  · Stool tests.  · Cultures (to look for evidence of infection).  · X-rays or other imaging studies.  Test results can help your caregiver make decisions about treatment or the need for additional tests.  TREATMENT  You need to stay well hydrated. Drink frequently but in small amounts. You may wish to drink water, sports drinks, clear broth, or eat frozen ice pops or gelatin dessert to help stay hydrated. When you eat, eating slowly may help prevent nausea. There are also some antinausea medicines that may help prevent nausea.  HOME CARE INSTRUCTIONS   · Take all medicine as directed by your caregiver.  · If you do not have an appetite, do not force yourself to eat. However, you must continue to drink fluids.  · If you have an appetite, eat a normal diet unless your caregiver tells you differently.  · Eat a variety of complex carbohydrates (rice, wheat, potatoes, bread), lean meats, yogurt, fruits, and vegetables.  · Avoid high-fat foods because they are more difficult to digest.  · Drink enough water and fluids to keep  your urine clear or pale yellow.  · If you are dehydrated, ask your caregiver for specific rehydration instructions. Signs of dehydration may include:  · Severe thirst.  · Dry lips and mouth.  · Dizziness.  · Dark urine.  · Decreasing urine frequency and amount.  · Confusion.  · Rapid breathing or pulse.  SEEK IMMEDIATE MEDICAL CARE IF:   · You have blood or brown flecks (like coffee grounds) in your vomit.  · You have black or bloody stools.  · You have a severe headache or stiff neck.  · You are confused.  · You have severe abdominal pain.  · You have chest pain or trouble breathing.  · You do not urinate at least once every 8 hours.  · You develop cold or clammy skin.  · You continue to vomit for longer than 24 to 48 hours.  · You have a fever.  MAKE SURE YOU:   · Understand these instructions.  · Will watch your condition.  · Will get help right away if you are not doing well or get worse.     This information is not intended to replace advice given to you by your health care provider. Make sure you discuss any questions you have with your health care provider.     Document Released: 12/18/2006 Document Revised: 03/11/2013 Document Reviewed: 05/16/2012  [employer] Interactive Patient Education ©2016 [employer]  Diarrhea  Diarrhea is frequent loose and watery bowel movements. It can cause you to feel weak and dehydrated. Dehydration can cause you to become tired and thirsty, have a dry mouth, and have decreased urination that often is dark yellow. Diarrhea is a sign of another problem, most often an infection that will not last long. In most cases, diarrhea typically lasts 2-3 days. However, it can last longer if it is a sign of something more serious. It is important to treat your diarrhea as directed by your caregiver to lessen or prevent future episodes of diarrhea.  CAUSES   Some common causes include:  · Gastrointestinal infections caused by viruses, bacteria, or parasites.  · Food poisoning or food  allergies.  · Certain medicines, such as antibiotics, chemotherapy, and laxatives.  · Artificial sweeteners and fructose.  · Digestive disorders.  HOME CARE INSTRUCTIONS  · Ensure adequate fluid intake (hydration): Have 1 cup (8 oz) of fluid for each diarrhea episode. Avoid fluids that contain simple sugars or sports drinks, fruit juices, whole milk products, and sodas. Your urine should be clear or pale yellow if you are drinking enough fluids. Hydrate with an oral rehydration solution that you can purchase at pharmacies, retail stores, and online. You can prepare an oral rehydration solution at home by mixing the following ingredients together:  ¨  - tsp table salt.  ¨ ¾ tsp baking soda.  ¨  tsp salt substitute containing potassium chloride.  ¨ 1  tablespoons sugar.  ¨ 1 L (34 oz) of water.  · Certain foods and beverages may increase the speed at which food moves through the gastrointestinal (GI) tract. These foods and beverages should be avoided and include:  ¨ Caffeinated and alcoholic beverages.  ¨ High-fiber foods, such as raw fruits and vegetables, nuts, seeds, and whole grain breads and cereals.  ¨ Foods and beverages sweetened with sugar alcohols, such as xylitol, sorbitol, and mannitol.  · Some foods may be well tolerated and may help thicken stool including:  ¨ Starchy foods, such as rice, toast, pasta, low-sugar cereal, oatmeal, grits, baked potatoes, crackers, and bagels.  ¨ Bananas.  ¨ Applesauce.  · Add probiotic-rich foods to help increase healthy bacteria in the GI tract, such as yogurt and fermented milk products.  · Wash your hands well after each diarrhea episode.  · Only take over-the-counter or prescription medicines as directed by your caregiver.  · Take a warm bath to relieve any burning or pain from frequent diarrhea episodes.  SEEK IMMEDIATE MEDICAL CARE IF:   · You are unable to keep fluids down.  · You have persistent vomiting.  · You have blood in your stool, or your stools are black and  tarry.  · You do not urinate in 6-8 hours, or there is only a small amount of very dark urine.  · You have abdominal pain that increases or localizes.  · You have weakness, dizziness, confusion, or light-headedness.  · You have a severe headache.  · Your diarrhea gets worse or does not get better.  · You have a fever or persistent symptoms for more than 2-3 days.  · You have a fever and your symptoms suddenly get worse.  MAKE SURE YOU:   · Understand these instructions.  · Will watch your condition.  · Will get help right away if you are not doing well or get worse.     This information is not intended to replace advice given to you by your health care provider. Make sure you discuss any questions you have with your health care provider.     Document Released: 12/08/2003 Document Revised: 01/08/2016 Document Reviewed: 08/25/2013  [employer] Interactive Patient Education ©2016 [employer]

## 2017-06-06 NOTE — ED AVS SNAPSHOT
Quikey Access Code: Activation code not generated  Current Quikey Status: Active    [employer]  A secure, online tool to manage your health information     [employer]’s Quikey® is a secure, online tool that connects you to your personalized health information from the privacy of your home -- day or night - making it very easy for you to manage your healthcare. Once the activation process is completed, you can even access your medical information using the Quikey jerson, which is available for free in the Apple Jerson store or Google Play store.     Quikey provides the following levels of access (as shown below):   My Chart Features   [facility] Primary Care Doctor [facility]  Specialists [facility]  Urgent  Care Non-[facility]  Primary Care  Doctor   Email your healthcare team securely and privately 24/7 X X X X   Manage appointments: schedule your next appointment; view details of past/upcoming appointments X      Request prescription refills. X      View recent personal medical records, including lab and immunizations X X X X   View health record, including health history, allergies, medications X X X X   Read reports about your outpatient visits, procedures, consult and ER notes X X X X   See your discharge summary, which is a recap of your hospital and/or ER visit that includes your diagnosis, lab results, and care plan. X X       How to register for Quikey:  1. Go to  [email or web address].  2. Click on the Sign Up Now box, which takes you to the New Member Sign Up page. You will need to provide the following information:  a. Enter your Quikey Access Code exactly as it appears at the top of this page. (You will not need to use this code after you’ve completed the sign-up process. If you do not sign up before the expiration date, you must request a new code.)   b. Enter your date of birth.   c. Enter your home email address.   d. Click Submit, and follow the next screen’s instructions.  3. Create a Quikey ID. This will  be your [employer] login ID and cannot be changed, so think of one that is secure and easy to remember.  4. Create a [employer] password. You can change your password at any time.  5. Enter your Password Reset Question and Answer. This can be used at a later time if you forget your password.   6. Enter your e-mail address. This allows you to receive e-mail notifications when new information is available in [employer].  7. Click Sign Up. You can now view your health information.    For assistance activating your [employer] account, call [phone]

## 2017-06-06 NOTE — ED NOTES
Discharge orders received from [facility]. New prescriptions x 3 sent to pt's pharmacy. Provided education and patient demonstrated understanding. Pt ambulatory off unit. All personal belonging with patient.

## 2017-06-06 NOTE — ED AVS SNAPSHOT
Home Care Instructions                                                                                                                Evelyn Meeks   MRN: [id number]    Department:  [facility], Emergency Dept   Date of Visit:  6/6/2017            [facility] Emergency Dept    [address]    [place] [address]    Phone:  [phone]      You were seen by     Justin Montoya M.D.      Your Diagnosis Was     Non-intractable vomiting with nausea, unspecified vomiting type     R11.2       These are the medications you received during your hospitalization from 06/06/2017 0702 to 06/06/2017 1310     Date/Time Order Dose Route Action    06/06/2017 0845 NS infusion   Intravenous New Bag    06/06/2017 1001 iohexol (OMNIPAQUE) 350 mg/mL 100 mL Intravenous Given    06/06/2017 1003 iohexol (OMNIPAQUE) 240 mg/mL 50 mL Oral Given    06/06/2017 1109 potassium chloride SA (Kdur) tablet 40 mEq 40 mEq Oral Given      Follow-up Information     1. Follow up with [facility]. Schedule an appointment as soon as possible for a visit today.    Contact information    [place] [address] [phone]        Medication Information     Review all of your home medications and newly ordered medications with your primary doctor and/or pharmacist as soon as possible. Follow medication instructions as directed by your doctor and/or pharmacist.     Please keep your complete medication list with you and share with your physician. Update the information when medications are discontinued, doses are changed, or new medications (including over-the-counter products) are added; and carry medication information at all times in the event of emergency situations.               Medication List      START taking these medications        Instructions    Morning Afternoon Evening Bedtime    metronidazole 500 MG Tabs   Commonly known as:  FLAGYL        Take 0.5 Tabs by mouth every 6 hours for 10  days.   Dose:  250 mg                        ondansetron 4 MG Tabs tablet   Commonly known as:  ZOFRAN        Take 1 Tab by mouth every 8 hours as needed (FOR NAUSEA OR VIMITING) for up to 6 doses.   Dose:  4 mg                        potassium chloride 20 MEQ Pack   Commonly known as:  KLOR-CON        Take 1 Packet by mouth every day for 11 doses.   Dose:  20 mEq                          ASK your doctor about these medications        Instructions    Morning Afternoon Evening Bedtime    alendronate 70 MG Tabs   Commonly known as:  FOSAMAX        Take 70 mg by mouth every 7 days. Thursdays   Dose:  70 mg                        azithromycin 250 MG Tabs   Commonly known as:  ZITHROMAX        Take 250-500 mg by mouth every day. Pt started a 5 day course on 5/26   Dose:  250-500 mg                        gabapentin 300 MG Caps   Commonly known as:  NEURONTIN        Take 300 mg by mouth 2 Times a Day.   Dose:  300 mg                        ibuprofen 200 MG Tabs   Commonly known as:  MOTRIN        Take 600 mg by mouth every four hours as needed for Mild Pain.   Dose:  600 mg                        levothyroxine 50 MCG Tabs   Commonly known as:  SYNTHROID        TAKE ONE TABLET BY MOUTH EVERY MORNING ON AN EMPTY STOMACH                        loratadine 10 MG Tabs   Commonly known as:  CLARITIN        TAKE ONE TABLET BY MOUTH DAILY                        pravastatin 40 MG tablet   Commonly known as:  PRAVACHOL        Take 1 Tab by mouth every day.   Dose:  40 mg                        sumatriptan 50 MG Tabs   Commonly known as:  IMITREX        TAKE ONE TABLET BY MOUTH AT ONSET OF HEADACHE; MAY REPEAT ONE TABLET IN 2 HOURS AS NEEDED. MAX OF 2 DOSES A DAY                        tramadol 50 MG Tabs   Commonly known as:  ULTRAM        Take 1 Tab by mouth every four hours as needed for Moderate Pain.   Dose:  50 mg                        Vitamin D-3 1000 UNITS Caps        Take 2,000 Units by mouth every day.   Dose:  2000 Units                               Where to Get Your Medications      These medications were sent to \[facility]\"" [facility] #[id number] - [place], [place] - [address]  [address] [place] [address]     Phone:  [phone]    - metronidazole 500 MG Tabs  - ondansetron 4 MG Tabs tablet  - potassium chloride 20 MEQ Pack            Procedures and tests performed during your visit     BLOOD CULTURE,HOLD    CBC WITH DIFFERENTIAL    COMP METABOLIC PANEL    CONSENT FOR CONTRAST INJECTION    CT-ABDOMEN-PELVIS WITH    DIFFERENTIAL COMMENT    ESTIMATED GFR    LIPASE    PERIPHERAL SMEAR REVIEW    POC UA        Discharge Instructions       Diarrhea  Diarrhea is frequent loose and watery bowel movements. It can cause you to feel weak and dehydrated. Dehydration can cause you to become tired and thirsty, have a dry mouth, and have decreased urination that often is dark yellow. Diarrhea is a sign of another problem, most often an infection that will not last long. In most cases, diarrhea typically lasts 2-3 days. However, it can last longer if it is a sign of something more serious. It is important to treat your diarrhea as directed by your caregiver to lessen or prevent future episodes of diarrhea.  CAUSES   Some common causes include:  · Gastrointestinal infections caused by viruses, bacteria, or parasites.  · Food poisoning or food allergies.  · Certain medicines, such as antibiotics, chemotherapy, and laxatives.  · Artificial sweeteners and fructose.  · Digestive disorders.  HOME CARE INSTRUCTIONS  · Ensure adequate fluid intake (hydration): Have 1 cup (8 oz) of fluid for each diarrhea episode. Avoid fluids that contain simple sugars or sports drinks, fruit juices, whole milk products, and sodas. Your urine should be clear or pale yellow if you are drinking enough fluids. Hydrate with an oral rehydration solution that you can purchase at pharmacies, retail stores, and online. You can prepare an oral rehydration solution at home  by mixing the following ingredients together:  ¨  - tsp table salt.  ¨ ¾ tsp baking soda.  ¨  tsp salt substitute containing potassium chloride.  ¨ 1  tablespoons sugar.  ¨ 1 L (34 oz) of water.  · Certain foods and beverages may increase the speed at which food moves through the gastrointestinal (GI) tract. These foods and beverages should be avoided and include:  ¨ Caffeinated and alcoholic beverages.  ¨ High-fiber foods, such as raw fruits and vegetables, nuts, seeds, and whole grain breads and cereals.  ¨ Foods and beverages sweetened with sugar alcohols, such as xylitol, sorbitol, and mannitol.  · Some foods may be well tolerated and may help thicken stool including:  ¨ Starchy foods, such as rice, toast, pasta, low-sugar cereal, oatmeal, grits, baked potatoes, crackers, and bagels.  ¨ Bananas.  ¨ Applesauce.  · Add probiotic-rich foods to help increase healthy bacteria in the GI tract, such as yogurt and fermented milk products.  · Wash your hands well after each diarrhea episode.  · Only take over-the-counter or prescription medicines as directed by your caregiver.  · Take a warm bath to relieve any burning or pain from frequent diarrhea episodes.  SEEK IMMEDIATE MEDICAL CARE IF:   · You are unable to keep fluids down.  · You have persistent vomiting.  · You have blood in your stool, or your stools are black and tarry.  · You do not urinate in 6-8 hours, or there is only a small amount of very dark urine.  · You have abdominal pain that increases or localizes.  · You have weakness, dizziness, confusion, or light-headedness.  · You have a severe headache.  · Your diarrhea gets worse or does not get better.  · You have a fever or persistent symptoms for more than 2-3 days.  · You have a fever and your symptoms suddenly get worse.  MAKE SURE YOU:   · Understand these instructions.  · Will watch your condition.  · Will get help right away if you are not doing well or get worse.     This information is not  intended to replace advice given to you by your health care provider. Make sure you discuss any questions you have with your health care provider.     Document Released: 12/08/2003 Document Revised: 01/08/2016 Document Reviewed: 08/25/2013  [employer] Interactive Patient Education ©2016 [employer]    Dehydration, Adult  Dehydration is when you lose more fluids from the body than you take in. Vital organs like the kidneys, brain, and heart cannot function without a proper amount of fluids and salt. Any loss of fluids from the body can cause dehydration.   CAUSES   · Vomiting.  · Diarrhea.  · Excessive sweating.  · Excessive urine output.  · Fever.  SYMPTOMS   Mild dehydration  · Thirst.  · Dry lips.  · Slightly dry mouth.  Moderate dehydration  · Very dry mouth.  · Sunken eyes.  · Skin does not bounce back quickly when lightly pinched and released.  · Dark urine and decreased urine production.  · Decreased tear production.  · Headache.  Severe dehydration  · Very dry mouth.  · Extreme thirst.  · Rapid, weak pulse (more than 100 beats per minute at rest).  · Cold hands and feet.  · Not able to sweat in spite of heat and temperature.  · Rapid breathing.  · Blue lips.  · Confusion and lethargy.  · Difficulty being awakened.  · Minimal urine production.  · No tears.  DIAGNOSIS   Your caregiver will diagnose dehydration based on your symptoms and your exam. Blood and urine tests will help confirm the diagnosis. The diagnostic evaluation should also identify the cause of dehydration.  TREATMENT   Treatment of mild or moderate dehydration can often be done at home by increasing the amount of fluids that you drink. It is best to drink small amounts of fluid more often. Drinking too much at one time can make vomiting worse. Refer to the home care instructions below.  Severe dehydration needs to be treated at the hospital where you will probably be given intravenous (IV) fluids that contain water and electrolytes.  HOME CARE  INSTRUCTIONS   · Ask your caregiver about specific rehydration instructions.  · Drink enough fluids to keep your urine clear or pale yellow.  · Drink small amounts frequently if you have nausea and vomiting.  · Eat as you normally do.  · Avoid:  · Foods or drinks high in sugar.  · Carbonated drinks.  · Juice.  · Extremely hot or cold fluids.  · Drinks with caffeine.  · Fatty, greasy foods.  · Alcohol.  · Tobacco.  · Overeating.  · Gelatin desserts.  · Wash your hands well to avoid spreading bacteria and viruses.  · Only take over-the-counter or prescription medicines for pain, discomfort, or fever as directed by your caregiver.  · Ask your caregiver if you should continue all prescribed and over-the-counter medicines.  · Keep all follow-up appointments with your caregiver.  SEEK MEDICAL CARE IF:  · You have abdominal pain and it increases or stays in one area (localizes).  · You have a rash, stiff neck, or severe headache.  · You are irritable, sleepy, or difficult to awaken.  · You are weak, dizzy, or extremely thirsty.  SEEK IMMEDIATE MEDICAL CARE IF:   · You are unable to keep fluids down or you get worse despite treatment.  · You have frequent episodes of vomiting or diarrhea.  · You have blood or green matter (bile) in your vomit.  · You have blood in your stool or your stool looks black and tarry.  · You have not urinated in 6 to 8 hours, or you have only urinated a small amount of very dark urine.  · You have a fever.  · You faint.  MAKE SURE YOU:   · Understand these instructions.  · Will watch your condition.  · Will get help right away if you are not doing well or get worse.     This information is not intended to replace advice given to you by your health care provider. Make sure you discuss any questions you have with your health care provider.     Document Released: 12/18/2006 Document Revised: 03/11/2013 Document Reviewed: 08/06/2012  [employer] Interactive Patient Education ©2016 [employer]    Hypokalemia  Hypokalemia means that the amount of potassium in the blood is lower than normal. Potassium is a chemical, called an electrolyte, that helps regulate the amount of fluid in the body. It also stimulates muscle contraction and helps nerves function properly. Most of the body's potassium is inside of cells, and only a very small amount is in the blood. Because the amount in the blood is so small, minor changes can be life-threatening.  CAUSES  · Antibiotics.  · Diarrhea or vomiting.  · Using laxatives too much, which can cause diarrhea.  · Chronic kidney disease.  · Water pills (diuretics).  · Eating disorders (bulimia).  · Low magnesium level.  · Sweating a lot.  SIGNS AND SYMPTOMS  · Weakness.  · Constipation.  · Fatigue.  · Muscle cramps.  · Mental confusion.  · Skipped heartbeats or irregular heartbeat (palpitations).  · Tingling or numbness.  DIAGNOSIS   Your health care provider can diagnose hypokalemia with blood tests. In addition to checking your potassium level, your health care provider may also check other lab tests.  TREATMENT  Hypokalemia can be treated with potassium supplements taken by mouth or adjustments in your current medicines. If your potassium level is very low, you may need to get potassium through a vein (IV) and be monitored in the hospital. A diet high in potassium is also helpful. Foods high in potassium are:  · Nuts, such as peanuts and pistachios.  · Seeds, such as sunflower seeds and pumpkin seeds.  · Peas, lentils, and lima beans.  · Whole grain and bran cereals and breads.  · Fresh fruit and vegetables, such as apricots, avocado, bananas, cantaloupe, kiwi, oranges, tomatoes, asparagus, and potatoes.  · Orange and tomato juices.  · Red meats.  · Fruit yogurt.  HOME CARE INSTRUCTIONS  · Take all medicines as prescribed by your health care provider.  · Maintain a healthy diet by including nutritious food, such as fruits, vegetables, nuts, whole grains, and lean  meats.  · If you are taking a laxative, be sure to follow the directions on the label.  SEEK MEDICAL CARE IF:  · Your weakness gets worse.  · You feel your heart pounding or racing.  · You are vomiting or having diarrhea.  · You are diabetic and having trouble keeping your blood glucose in the normal range.  SEEK IMMEDIATE MEDICAL CARE IF:  · You have chest pain, shortness of breath, or dizziness.  · You are vomiting or having diarrhea for more than 2 days.  · You faint.  MAKE SURE YOU:   · Understand these instructions.  · Will watch your condition.  · Will get help right away if you are not doing well or get worse.     This information is not intended to replace advice given to you by your health care provider. Make sure you discuss any questions you have with your health care provider.     Document Released: 12/18/2006 Document Revised: 01/08/2016 Document Reviewed: 06/20/2014  [employer] Interactive Patient Education ©2016 [employer]    Hypokalemia  Hypokalemia means that the amount of potassium in the blood is lower than normal. Potassium is a chemical, called an electrolyte, that helps regulate the amount of fluid in the body. It also stimulates muscle contraction and helps nerves function properly. Most of the body's potassium is inside of cells, and only a very small amount is in the blood. Because the amount in the blood is so small, minor changes can be life-threatening.  CAUSES  · Antibiotics.  · Diarrhea or vomiting.  · Using laxatives too much, which can cause diarrhea.  · Chronic kidney disease.  · Water pills (diuretics).  · Eating disorders (bulimia).  · Low magnesium level.  · Sweating a lot.  SIGNS AND SYMPTOMS  · Weakness.  · Constipation.  · Fatigue.  · Muscle cramps.  · Mental confusion.  · Skipped heartbeats or irregular heartbeat (palpitations).  · Tingling or numbness.  DIAGNOSIS   Your health care provider can diagnose hypokalemia with blood tests. In addition to checking your potassium  level, your health care provider may also check other lab tests.  TREATMENT  Hypokalemia can be treated with potassium supplements taken by mouth or adjustments in your current medicines. If your potassium level is very low, you may need to get potassium through a vein (IV) and be monitored in the hospital. A diet high in potassium is also helpful. Foods high in potassium are:  · Nuts, such as peanuts and pistachios.  · Seeds, such as sunflower seeds and pumpkin seeds.  · Peas, lentils, and lima beans.  · Whole grain and bran cereals and breads.  · Fresh fruit and vegetables, such as apricots, avocado, bananas, cantaloupe, kiwi, oranges, tomatoes, asparagus, and potatoes.  · Orange and tomato juices.  · Red meats.  · Fruit yogurt.  HOME CARE INSTRUCTIONS  · Take all medicines as prescribed by your health care provider.  · Maintain a healthy diet by including nutritious food, such as fruits, vegetables, nuts, whole grains, and lean meats.  · If you are taking a laxative, be sure to follow the directions on the label.  SEEK MEDICAL CARE IF:  · Your weakness gets worse.  · You feel your heart pounding or racing.  · You are vomiting or having diarrhea.  · You are diabetic and having trouble keeping your blood glucose in the normal range.  SEEK IMMEDIATE MEDICAL CARE IF:  · You have chest pain, shortness of breath, or dizziness.  · You are vomiting or having diarrhea for more than 2 days.  · You faint.  MAKE SURE YOU:   · Understand these instructions.  · Will watch your condition.  · Will get help right away if you are not doing well or get worse.     This information is not intended to replace advice given to you by your health care provider. Make sure you discuss any questions you have with your health care provider.     Document Released: 12/18/2006 Document Revised: 01/08/2016 Document Reviewed: 06/20/2014  [employer] Interactive Patient Education ©2016 [employer]    Nausea and Vomiting  Nausea is a sick feeling  that often comes before throwing up (vomiting). Vomiting is a reflex where stomach contents come out of your mouth. Vomiting can cause severe loss of body fluids (dehydration). Children and elderly adults can become dehydrated quickly, especially if they also have diarrhea. Nausea and vomiting are symptoms of a condition or disease. It is important to find the cause of your symptoms.  CAUSES   · Direct irritation of the stomach lining. This irritation can result from increased acid production (gastroesophageal reflux disease), infection, food poisoning, taking certain medicines (such as nonsteroidal anti-inflammatory drugs), alcohol use, or tobacco use.  · Signals from the brain. These signals could be caused by a headache, heat exposure, an inner ear disturbance, increased pressure in the brain from injury, infection, a tumor, or a concussion, pain, emotional stimulus, or metabolic problems.  · An obstruction in the gastrointestinal tract (bowel obstruction).  · Illnesses such as diabetes, hepatitis, gallbladder problems, appendicitis, kidney problems, cancer, sepsis, atypical symptoms of a heart attack, or eating disorders.  · Medical treatments such as chemotherapy and radiation.  · Receiving medicine that makes you sleep (general anesthetic) during surgery.  DIAGNOSIS  Your caregiver may ask for tests to be done if the problems do not improve after a few days. Tests may also be done if symptoms are severe or if the reason for the nausea and vomiting is not clear. Tests may include:  · Urine tests.  · Blood tests.  · Stool tests.  · Cultures (to look for evidence of infection).  · X-rays or other imaging studies.  Test results can help your caregiver make decisions about treatment or the need for additional tests.  TREATMENT  You need to stay well hydrated. Drink frequently but in small amounts. You may wish to drink water, sports drinks, clear broth, or eat frozen ice pops or gelatin dessert to help stay  hydrated. When you eat, eating slowly may help prevent nausea. There are also some antinausea medicines that may help prevent nausea.  HOME CARE INSTRUCTIONS   · Take all medicine as directed by your caregiver.  · If you do not have an appetite, do not force yourself to eat. However, you must continue to drink fluids.  · If you have an appetite, eat a normal diet unless your caregiver tells you differently.  · Eat a variety of complex carbohydrates (rice, wheat, potatoes, bread), lean meats, yogurt, fruits, and vegetables.  · Avoid high-fat foods because they are more difficult to digest.  · Drink enough water and fluids to keep your urine clear or pale yellow.  · If you are dehydrated, ask your caregiver for specific rehydration instructions. Signs of dehydration may include:  · Severe thirst.  · Dry lips and mouth.  · Dizziness.  · Dark urine.  · Decreasing urine frequency and amount.  · Confusion.  · Rapid breathing or pulse.  SEEK IMMEDIATE MEDICAL CARE IF:   · You have blood or brown flecks (like coffee grounds) in your vomit.  · You have black or bloody stools.  · You have a severe headache or stiff neck.  · You are confused.  · You have severe abdominal pain.  · You have chest pain or trouble breathing.  · You do not urinate at least once every 8 hours.  · You develop cold or clammy skin.  · You continue to vomit for longer than 24 to 48 hours.  · You have a fever.  MAKE SURE YOU:   · Understand these instructions.  · Will watch your condition.  · Will get help right away if you are not doing well or get worse.     This information is not intended to replace advice given to you by your health care provider. Make sure you discuss any questions you have with your health care provider.     Document Released: 12/18/2006 Document Revised: 03/11/2013 Document Reviewed: 05/16/2012  [employer] Interactive Patient Education ©2016 [employer]  Diarrhea  Diarrhea is frequent loose and watery bowel movements. It can  cause you to feel weak and dehydrated. Dehydration can cause you to become tired and thirsty, have a dry mouth, and have decreased urination that often is dark yellow. Diarrhea is a sign of another problem, most often an infection that will not last long. In most cases, diarrhea typically lasts 2-3 days. However, it can last longer if it is a sign of something more serious. It is important to treat your diarrhea as directed by your caregiver to lessen or prevent future episodes of diarrhea.  CAUSES   Some common causes include:  · Gastrointestinal infections caused by viruses, bacteria, or parasites.  · Food poisoning or food allergies.  · Certain medicines, such as antibiotics, chemotherapy, and laxatives.  · Artificial sweeteners and fructose.  · Digestive disorders.  HOME CARE INSTRUCTIONS  · Ensure adequate fluid intake (hydration): Have 1 cup (8 oz) of fluid for each diarrhea episode. Avoid fluids that contain simple sugars or sports drinks, fruit juices, whole milk products, and sodas. Your urine should be clear or pale yellow if you are drinking enough fluids. Hydrate with an oral rehydration solution that you can purchase at pharmacies, retail stores, and online. You can prepare an oral rehydration solution at home by mixing the following ingredients together:  ¨  - tsp table salt.  ¨ ¾ tsp baking soda.  ¨  tsp salt substitute containing potassium chloride.  ¨ 1  tablespoons sugar.  ¨ 1 L (34 oz) of water.  · Certain foods and beverages may increase the speed at which food moves through the gastrointestinal (GI) tract. These foods and beverages should be avoided and include:  ¨ Caffeinated and alcoholic beverages.  ¨ High-fiber foods, such as raw fruits and vegetables, nuts, seeds, and whole grain breads and cereals.  ¨ Foods and beverages sweetened with sugar alcohols, such as xylitol, sorbitol, and mannitol.  · Some foods may be well tolerated and may help thicken stool including:  ¨ Starchy foods, such as  rice, toast, pasta, low-sugar cereal, oatmeal, grits, baked potatoes, crackers, and bagels.  ¨ Bananas.  ¨ Applesauce.  · Add probiotic-rich foods to help increase healthy bacteria in the GI tract, such as yogurt and fermented milk products.  · Wash your hands well after each diarrhea episode.  · Only take over-the-counter or prescription medicines as directed by your caregiver.  · Take a warm bath to relieve any burning or pain from frequent diarrhea episodes.  SEEK IMMEDIATE MEDICAL CARE IF:   · You are unable to keep fluids down.  · You have persistent vomiting.  · You have blood in your stool, or your stools are black and tarry.  · You do not urinate in 6-8 hours, or there is only a small amount of very dark urine.  · You have abdominal pain that increases or localizes.  · You have weakness, dizziness, confusion, or light-headedness.  · You have a severe headache.  · Your diarrhea gets worse or does not get better.  · You have a fever or persistent symptoms for more than 2-3 days.  · You have a fever and your symptoms suddenly get worse.  MAKE SURE YOU:   · Understand these instructions.  · Will watch your condition.  · Will get help right away if you are not doing well or get worse.     This information is not intended to replace advice given to you by your health care provider. Make sure you discuss any questions you have with your health care provider.     Document Released: 12/08/2003 Document Revised: 01/08/2016 Document Reviewed: 08/25/2013  [employer] Interactive Patient Education ©2016 [employer]            Patient Information     Patient Information    Following emergency treatment: all patient requiring follow-up care must return either to a private physician or a clinic if your condition worsens before you are able to obtain further medical attention, please return to the emergency room.     Billing Information    At [facility], we work to make the billing process streamlined for our patients.   Our Representatives are here to answer any questions you may have regarding your hospital bill.  If you have insurance coverage and have supplied your insurance information to us, we will submit a claim to your insurer on your behalf.  Should you have any questions regarding your bill, we can be reached online or by phone as follows:  Online: You are able pay your bills online or live chat with our representatives about any billing questions you may have. We are here to help Monday - Friday from 8:00am to 7:30pm and 9:00am - 12:00pm on Saturdays.  Please visit [email or web address]  for more information.   Phone:  [phone] or [phone]    Please note that your emergency physician, surgeon, pathologist, radiologist, anesthesiologist, and other specialists are not employed by [facility] and will therefore bill separately for their services.  Please contact them directly for any questions concerning their bills at the numbers below:     Emergency Physician Services:  [phone]  [facility]:  [phone]  [facility]:  [phone]  [facility]:  [phone]    1. Your final bill may vary from the amount quoted upon discharge if all procedures are not complete at that time, or if your doctor has additional procedures of which we are not aware. You will receive an additional bill if you return to the Emergency Department at [facility] for suture removal regardless of the facility of which the sutures were placed.     2. Please arrange for settlement of this account at the emergency registration.    3. All self-pay accounts are due in full at the time of treatment.  If you are unable to meet this obligation then payment is expected within 4-5 days.     4. If you have had radiology studies (CT, X-ray, Ultrasound, MRI), you have received a preliminary result during your emergency department visit. Please contact the radiology  department [phone] to receive a copy of your final result. Please discuss the Final result with your primary physician or with the follow up physician provided.     Crisis Hotline:  [place] Crisis Hotline:  5-218-FOJMISL or 1-780.699.2459  [place] Crisis Hotline:    1-693.614.2879 or 520-796-8617         ED Discharge Follow Up Questions    1. In order to provide you with very good care, we would like to follow up with a phone call in the next few days.  May we have your permission to contact you?     YES /  NO    2. What is the best phone number to call you? (       )_____-__________    3. What is the best time to call you?      Morning  /  Afternoon  /  Evening                   Patient Signature:  ____________________________________________________________    Date:  ____________________________________________________________      Your appointments     Aug 17, 2017  1:00 PM   Established Patient with Segundo Crawford D.O.   [facility] [address] [id number] [phone]           You will be receiving a confirmation call a few days before your appointment from our automated call confirmation system.

## 2017-06-06 NOTE — ED NOTES
Patient ambualtory to triage:  Chief Complaint   Patient presents with   • Nausea/Vomiting/Diarrhea     x9 days   • Dizziness     x9 days   • Cough     productive, green/white mucous   • Bloody Stools     x2 days     Patient reports seen at [facility] facility on 5/26/17, dx bronchitis, taking medication a prescribed and feeling increasingly worse since then.    Explained wait time and triage process to pt. Pt placed back out in lobby, told to notify ED tech or triage RN of any changes, verbalized understanding.

## 2017-06-22 ENCOUNTER — TELEPHONE (OUTPATIENT)
Dept: MEDICAL GROUP | Facility: CLINIC | Age: 57
End: 2017-06-22

## 2017-06-22 ENCOUNTER — PATIENT MESSAGE (OUTPATIENT)
Dept: MEDICAL GROUP | Facility: CLINIC | Age: 57
End: 2017-06-22

## 2017-06-22 DIAGNOSIS — N30.00 ACUTE CYSTITIS WITHOUT HEMATURIA: ICD-10-CM

## 2017-06-22 RX ORDER — SULFAMETHOXAZOLE AND TRIMETHOPRIM 800; 160 MG/1; MG/1
1 TABLET ORAL 2 TIMES DAILY
Qty: 10 TAB | Refills: 0 | Status: SHIPPED | OUTPATIENT
Start: 2017-06-22 | End: 2017-11-20

## 2017-06-22 NOTE — TELEPHONE ENCOUNTER
----- Message from Your Healthcare Team sent at 6/22/2017  3:45 PM PDT -----  Regarding: Prescription Question  Contact: [phone]  Can you please send a prescription of Bactrim to the [facility]. I believe I have a UTI.  Thanks

## 2017-07-05 ENCOUNTER — HOSPITAL ENCOUNTER (OUTPATIENT)
Dept: LAB | Facility: MEDICAL CENTER | Age: 57
End: 2017-07-05
Payer: COMMERCIAL

## 2017-07-05 LAB
BDY FAT % MEASURED: 28.7 %
BP DIAS: 80 MMHG
BP SYS: 120 MMHG
CHOLEST SERPL-MCNC: 224 MG/DL (ref 100–199)
DIABETES HTDIA: NO
EVENT NAME HTEVT: NORMAL
FASTING HTFAS: YES
GLUCOSE SERPL-MCNC: 106 MG/DL (ref 65–99)
HDLC SERPL-MCNC: 64 MG/DL
HYPERTENSION HTHYP: NO
LDLC SERPL CALC-MCNC: 137 MG/DL
SCREENING LOC CITY HTCIT: NORMAL
SCREENING LOC STATE HTSTA: NORMAL
SCREENING LOCATION HTLOC: NORMAL
SMOKING HTSMO: NO
SUBSCRIBER ID HTSID: NORMAL
TRIGL SERPL-MCNC: 113 MG/DL (ref 0–149)

## 2017-07-05 PROCEDURE — S5190 WELLNESS ASSESSMENT BY NONPH: HCPCS

## 2017-07-05 PROCEDURE — 36415 COLL VENOUS BLD VENIPUNCTURE: CPT

## 2017-07-05 PROCEDURE — 82947 ASSAY GLUCOSE BLOOD QUANT: CPT

## 2017-07-05 PROCEDURE — 80061 LIPID PANEL: CPT

## 2017-07-24 RX ORDER — PRAVASTATIN SODIUM 40 MG
TABLET ORAL
Qty: 30 TAB | Refills: 11 | Status: SHIPPED | OUTPATIENT
Start: 2017-07-24 | End: 2018-04-17 | Stop reason: SDUPTHER

## 2017-08-17 ENCOUNTER — OFFICE VISIT (OUTPATIENT)
Dept: MEDICAL GROUP | Facility: CLINIC | Age: 57
End: 2017-08-17
Payer: COMMERCIAL

## 2017-08-17 VITALS
HEIGHT: 67 IN | OXYGEN SATURATION: 98 % | TEMPERATURE: 98.4 F | HEART RATE: 72 BPM | RESPIRATION RATE: 18 BRPM | DIASTOLIC BLOOD PRESSURE: 64 MMHG | BODY MASS INDEX: 20.97 KG/M2 | WEIGHT: 133.6 LBS | SYSTOLIC BLOOD PRESSURE: 110 MMHG

## 2017-08-17 DIAGNOSIS — M81.8 OTHER OSTEOPOROSIS: ICD-10-CM

## 2017-08-17 DIAGNOSIS — E78.5 DYSLIPIDEMIA: ICD-10-CM

## 2017-08-17 PROCEDURE — 99213 OFFICE O/P EST LOW 20 MIN: CPT | Performed by: INTERNAL MEDICINE

## 2017-08-17 ASSESSMENT — PATIENT HEALTH QUESTIONNAIRE - PHQ9: CLINICAL INTERPRETATION OF PHQ2 SCORE: 0

## 2017-08-17 NOTE — PROGRESS NOTES
CC: Evelyn Meeks is a 57 y.o. female is suffering from   Chief Complaint   Patient presents with   • Follow-Up         SUBJECTIVE:  1. Dyslipidemia  Griselda is here for follow-up, has a history of dyslipidemia. We discussed statin drug therapy. Patient continues on Pravachol without side effects.     2. Other osteoporosis  Patient additionally has a history of osteoporosis is on alendronate, is to undergo surgery this upcoming month for her right wrist, apparently she's still having problems and nerve conduction through her rest after sustaining a right wrist fracture. Dr. Weber and Dr. Maynard will be repairing her wrist        Past social, family, history:   Social History   Substance Use Topics   • Smoking status: Never Smoker    • Smokeless tobacco: Never Used   • Alcohol Use: No         MEDICATIONS:    Current outpatient prescriptions:   •  pravastatin (PRAVACHOL) 40 MG tablet, TAKE ONE TABLET BY MOUTH DAILY, Disp: 30 Tab, Rfl: 11  •  alendronate (FOSAMAX) 70 MG Tab, Take 70 mg by mouth every 7 days. Thursdays, Disp: , Rfl:   •  gabapentin (NEURONTIN) 300 MG Cap, Take 300 mg by mouth 2 Times a Day., Disp: , Rfl:   •  ondansetron (ZOFRAN) 4 MG Tab tablet, Take 1 Tab by mouth every 8 hours as needed (FOR NAUSEA OR VIMITING) for up to 6 doses., Disp: 6 Each, Rfl: 2  •  loratadine (CLARITIN) 10 MG Tab, TAKE ONE TABLET BY MOUTH DAILY, Disp: 30 Tab, Rfl: 11  •  sumatriptan (IMITREX) 50 MG Tab, TAKE ONE TABLET BY MOUTH AT ONSET OF HEADACHE; MAY REPEAT ONE TABLET IN 2 HOURS AS NEEDED. MAX OF 2 DOSES A DAY, Disp: 9 Tab, Rfl: 6  •  levothyroxine (SYNTHROID) 50 MCG Tab, TAKE ONE TABLET BY MOUTH EVERY MORNING ON AN EMPTY STOMACH, Disp: 30 Tab, Rfl: 11  •  tramadol (ULTRAM) 50 MG Tab, Take 1 Tab by mouth every four hours as needed for Moderate Pain., Disp: 90 Tab, Rfl: 3  •  Cholecalciferol (VITAMIN D-3) 1000 UNITS Cap, Take 2,000 Units by mouth every day., Disp: , Rfl:   •  ibuprofen (MOTRIN) 200 MG Tab,  "Take 600 mg by mouth every four hours as needed for Mild Pain., Disp: , Rfl:   •  sulfamethoxazole-trimethoprim (BACTRIM DS) 800-160 MG tablet, Take 1 Tab by mouth 2 times a day., Disp: 10 Tab, Rfl: 0  •  azithromycin (ZITHROMAX) 250 MG Tab, Take 250-500 mg by mouth every day. Pt started a 5 day course on 5/26, Disp: , Rfl:     PROBLEMS:  Patient Active Problem List    Diagnosis Date Noted   • Carpal tunnel syndrome on right 01/20/2017   • Thoracic back pain 12/12/2016   • Closed fracture of distal end of right radius 08/10/2016   • Other specified hypothyroidism 07/19/2016   • Osteoporosis 01/15/2016   • Grief at loss of child 08/04/2015   • ADD (attention deficit disorder) 03/26/2013   • Gastroesophageal reflux disease 09/07/2012   • Anal fissure 10/06/2011   • Dyslipidemia 08/19/2011       REVIEW OF SYSTEMS:  Gen.:  No Nausea, Vomiting, fever, Chills.  Heart: No chest pain.  Lungs:  No shortness of Breath.  Psychological: Geo unusual Anxiety depression     PHYSICAL EXAM   Constitutional: Alert, cooperative, not in acute distress.  Cardiovascular:  Rate Rhythm is regular without murmurs rubs clicks.     Thorax & Lungs: Clear to auscultation, no wheezing, rhonchi, or rales  HENT: Normocephalic, Atraumatic.  Eyes: PERRLA, EOMI, Conjunctiva normal.   Neck: Trachia is midline no swelling of the thyroid.   Neurologic: Alert & oriented x 3, cranial nerves II through XII are intact, Normal motor function, Normal sensory function, No focal deficits noted.   Psychiatric: Affect normal, Judgment normal, Mood normal.     VITAL SIGNS:/64 mmHg  Pulse 72  Temp(Src) 36.9 °C (98.4 °F)  Resp 18  Ht 1.702 m (5' 7.01\")  Wt 60.601 kg (133 lb 9.6 oz)  BMI 20.92 kg/m2  SpO2 98%  LMP 08/28/2002  Breastfeeding? No    Labs: Reviewed    Assessment:                                                     Plan:    1. Dyslipidemia  Continue on Pravachol    2. Other osteoporosis  Continue on Fosamax, patient's been on medication " for 2 years patient may need 10 years medical therapy

## 2017-08-17 NOTE — MR AVS SNAPSHOT
"        Evelyn Meeks   2017 1:00 PM   Office Visit   MRN: [id number]    Department:  [facility]   Dept Phone:  [phone]    Description:  Female : 1960   Provider:  Segundo Crawford D.O.           Reason for Visit     Follow-Up           Allergies as of 2017     Allergen Noted Reactions    Other Food 2017   Anaphylaxis    Wine coffee    Shellfish Allergy 2012   Anaphylaxis    RXN=whole life    Lidocaine (Anorectal) [Topicaine] 2016   Rash, Itching     patch glue        Pet [Cat Hair Extract] 2013   Hives    RXN=whole life    Pollen Extract 2013   Itching    RXN=whole life    Erythromycin 2017   Vomiting      You were diagnosed with     Dyslipidemia   [603877]         Vital Signs     Blood Pressure Pulse Temperature Respirations Height Weight    110/64 mmHg 72 36.9 °C (98.4 °F) 18 1.702 m (5' 7.01\") 60.601 kg (133 lb 9.6 oz)    Body Mass Index Oxygen Saturation Last Menstrual Period Breastfeeding? Smoking Status       20.92 kg/m2 98% 2002 No Never Smoker        Basic Information     Date Of Birth Sex Race Ethnicity Preferred Language    1960 Female White Non- English      Problem List              ICD-10-CM Priority Class Noted - Resolved    Dyslipidemia E78.5   2011 - Present    Anal fissure K60.2   10/6/2011 - Present    Gastroesophageal reflux disease K21.9   2012 - Present    ADD (attention deficit disorder) F98.8   3/26/2013 - Present    Grief at loss of child F43.21, Z63.4   2015 - Present    Osteoporosis M81.0   1/15/2016 - Present    Other specified hypothyroidism E03.8   2016 - Present    Closed fracture of distal end of right radius S52.501A   8/10/2016 - Present    Thoracic back pain M54.6   2016 - Present    Carpal tunnel syndrome on right G56.01   2017 - Present      Health Maintenance        Date Due Completion Dates    MAMMOGRAM 2017, 2014, 2011    IMM " INFLUENZA (1) 9/1/2017 10/25/2016, 11/18/2015, 10/2/2014, 11/22/2013, 10/2/2012    COLONOSCOPY 9/3/2022 9/3/2012 (Done)    Override on 9/3/2012: Done ([facility] MD Martell)    IMM DTaP/Tdap/Td Vaccine (2 - Td) 6/19/2025 6/19/2015            Current Immunizations     Influenza TIV (IM) 11/22/2013, 10/2/2012    Influenza Vaccine Quad Inj (Pf) 10/25/2016, 11/18/2015, 10/2/2014    Tdap Vaccine 6/19/2015    Tuberculin Skin Test 8/5/2014, 10/15/2013  8:05 AM, 10/17/2012  9:30 AM, 8/22/2012  9:35 AM, 10/18/2011, 11/3/2010  9:34 AM      Below and/or attached are the medications your provider expects you to take. Review all of your home medications and newly ordered medications with your provider and/or pharmacist. Follow medication instructions as directed by your provider and/or pharmacist. Please keep your medication list with you and share with your provider. Update the information when medications are discontinued, doses are changed, or new medications (including over-the-counter products) are added; and carry medication information at all times in the event of emergency situations     Allergies:  OTHER FOOD - Anaphylaxis     SHELLFISH ALLERGY - Anaphylaxis     LIDOCAINE (ANORECTAL) - Rash,Itching     PET - Hives     POLLEN EXTRACT - Itching     ERYTHROMYCIN - Vomiting               Medications  Valid as of: August 17, 2017 -  1:25 PM    Generic Name Brand Name Tablet Size Instructions for use    Alendronate Sodium (Tab) FOSAMAX 70 MG Take 70 mg by mouth every 7 days. Thursdays        Azithromycin (Tab) ZITHROMAX 250 MG Take 250-500 mg by mouth every day. Pt started a 5 day course on 5/26        Cholecalciferol (Cap) Vitamin D-3 1000 units Take 2,000 Units by mouth every day.        Gabapentin (Cap) NEURONTIN 300 MG Take 300 mg by mouth 2 Times a Day.        Ibuprofen (Tab) MOTRIN 200 MG Take 600 mg by mouth every four hours as needed for Mild Pain.        Levothyroxine Sodium (Tab) SYNTHROID 50 MCG TAKE ONE  TABLET BY MOUTH EVERY MORNING ON AN EMPTY STOMACH        Loratadine (Tab) CLARITIN 10 MG TAKE ONE TABLET BY MOUTH DAILY        Ondansetron HCl (Tab) ZOFRAN 4 MG Take 1 Tab by mouth every 8 hours as needed (FOR NAUSEA OR VIMITING) for up to 6 doses.        Pravastatin Sodium (Tab) PRAVACHOL 40 MG TAKE ONE TABLET BY MOUTH DAILY        Sulfamethoxazole-Trimethoprim (Tab) BACTRIM -160 MG Take 1 Tab by mouth 2 times a day.        SUMAtriptan Succinate (Tab) IMITREX 50 MG TAKE ONE TABLET BY MOUTH AT ONSET OF HEADACHE; MAY REPEAT ONE TABLET IN 2 HOURS AS NEEDED. MAX OF 2 DOSES A DAY        TraMADol HCl (Tab) ULTRAM 50 MG Take 1 Tab by mouth every four hours as needed for Moderate Pain.        .                 Medicines prescribed today were sent to:     [facility][id number] - [facility] [address] [id number] [facility] [facility] [place] [address]    Phone: [phone] Fax: [phone]    Open 24 Hours?: No      Medication refill instructions:       If your prescription bottle indicates you have medication refills left, it is not necessary to call your provider’s office. Please contact your pharmacy and they will refill your medication.    If your prescription bottle indicates you do not have any refills left, you may request refills at any time through one of the following ways: The online [employer] system (except Urgent Care), by calling your provider’s office, or by asking your pharmacy to contact your provider’s office with a refill request. Medication refills are processed only during regular business hours and may not be available until the next business day. Your provider may request additional information or to have a follow-up visit with you prior to refilling your medication.   *Please Note: Medication refills are assigned a new Rx number when refilled electronically. Your pharmacy may indicate that no refills were authorized even though a new prescription for the same medication is available at  the pharmacy. Please request the medicine by name with the pharmacy before contacting your provider for a refill.           MyChart Access Code: Activation code not generated  Current MyChart Status: Active

## 2017-09-05 ENCOUNTER — OFFICE VISIT (OUTPATIENT)
Dept: URGENT CARE | Facility: CLINIC | Age: 57
End: 2017-09-05
Payer: COMMERCIAL

## 2017-09-05 VITALS
OXYGEN SATURATION: 97 % | BODY MASS INDEX: 17.58 KG/M2 | SYSTOLIC BLOOD PRESSURE: 112 MMHG | TEMPERATURE: 97.1 F | HEIGHT: 67 IN | WEIGHT: 112 LBS | RESPIRATION RATE: 14 BRPM | DIASTOLIC BLOOD PRESSURE: 64 MMHG | HEART RATE: 75 BPM

## 2017-09-05 DIAGNOSIS — R22.0 RIGHT FACIAL SWELLING: ICD-10-CM

## 2017-09-05 PROCEDURE — 99203 OFFICE O/P NEW LOW 30 MIN: CPT | Performed by: EMERGENCY MEDICINE

## 2017-09-05 RX ORDER — DOXYCYCLINE HYCLATE 100 MG
100 TABLET ORAL 2 TIMES DAILY
Qty: 14 TAB | Refills: 0 | Status: SHIPPED | OUTPATIENT
Start: 2017-09-05 | End: 2017-09-12

## 2017-09-05 ASSESSMENT — ENCOUNTER SYMPTOMS
HEADACHES: 0
NECK PAIN: 0
ABDOMINAL PAIN: 0
EDEMA: 1
VISUAL CHANGE: 0
NAUSEA: 0
VOMITING: 0
SORE THROAT: 0
FEVER: 0
EYE REDNESS: 0
DOUBLE VISION: 0
EYE DISCHARGE: 0
DIZZINESS: 0
DIAPHORESIS: 0
CHILLS: 0
SWOLLEN GLANDS: 0
EYE PAIN: 0
TINGLING: 0
PHOTOPHOBIA: 0
SENSORY CHANGE: 0
COUGH: 0
FOCAL WEAKNESS: 0

## 2017-09-05 NOTE — PATIENT INSTRUCTIONS
Use an oral probiotic daily, such as Culturelle, Align, or yogurt to reduce gastrointestinal symptoms.  Use over-the-counter pain reliever, such as acetaminophen (Tylenol), ibuprofen (Advil, Motrin) or naproxen (Aleve) as needed; follow package directions for dosing.  Recheck in 2 days if not resolving; sooner if any worseniing.  Edema  Edema is an abnormal buildup of fluids. It is more common in your legs and thighs. Painless swelling of the feet and ankles is more likely as a person ages. It also is common in looser skin, like around your eyes.  HOME CARE   · Keep the affected body part above the level of the heart while lying down.  · Do not sit still or stand for a long time.  · Do not put anything right under your knees when you lie down.  · Do not wear tight clothes on your upper legs.  · Exercise your legs to help the puffiness (swelling) go down.  · Wear elastic bandages or support stockings as told by your doctor.  · A low-salt diet may help lessen the puffiness.  · Only take medicine as told by your doctor.  GET HELP IF:  · Treatment is not working.  · You have heart, liver, or kidney disease and notice that your skin looks puffy or shiny.  · You have puffiness in your legs that does not get better when you raise your legs.  · You have sudden weight gain for no reason.  GET HELP RIGHT AWAY IF:   · You have shortness of breath or chest pain.  · You cannot breathe when you lie down.  · You have pain, redness, or warmth in the areas that are puffy.  · You have heart, liver, or kidney disease and get edema all of a sudden.  · You have a fever and your symptoms get worse all of a sudden.  MAKE SURE YOU:   · Understand these instructions.  · Will watch your condition.  · Will get help right away if you are not doing well or get worse.     This information is not intended to replace advice given to you by your health care provider. Make sure you discuss any questions you have with your health care provider.      Document Released: 06/05/2009 Document Revised: 12/23/2014 Document Reviewed: 10/10/2014  [employer] Interactive Patient Education ©2016 [employer]

## 2017-09-05 NOTE — PROGRESS NOTES
Subjective:      Evelyn Meeks is a 57 y.o. female who presents with Sinusitis (x 3 days)            Edema   This is a new problem. Episode onset: 3 days ago. The problem occurs daily. The problem has been gradually improving. Associated symptoms include congestion. Pertinent negatives include no abdominal pain, chest pain, chills, coughing, diaphoresis, fever, headaches, nausea, neck pain, rash, sore throat, swollen glands, visual change or vomiting. Nothing aggravates the symptoms. She has tried nothing for the symptoms. The treatment provided moderate relief.   No trauma or recent change in environment. Notes awakened with right facial edema and pain 2 days ago, has improved since that point but remains temder.  Review of Systems   Constitutional: Negative for chills, diaphoresis and fever.   HENT: Positive for congestion. Negative for ear pain, nosebleeds and sore throat.         Transient right clear rhinorrhea noted   Eyes: Negative for double vision, photophobia, pain, discharge and redness.   Respiratory: Negative for cough.    Cardiovascular: Negative for chest pain.   Gastrointestinal: Negative for abdominal pain, nausea and vomiting.   Musculoskeletal: Negative for neck pain.   Skin: Negative for itching and rash.   Neurological: Negative for dizziness, tingling, sensory change, focal weakness and headaches.   Endo/Heme/Allergies: Negative for environmental allergies.     PMH:  has a past medical history of Anal fissure (10/6/2011); Back pain; Bronchitis (2015); Chicken pox; Disorder of thyroid; Dyslipidemia (8/19/2011); Gastroesophageal reflux disease (9/7/2012); Grief at loss of child (8/4/2015); Hemorrhoid; High cholesterol; Kidney stone; Measles; Migraine; Mumps; No pertinent past medical history; Osteoporosis (1/15/2016); Pneumonia (2015); and UTI (lower urinary tract infection) (2014).  MEDS:   Current Outpatient Prescriptions:   •  doxycycline (VIBRAMYCIN) 100 MG Tab, Take 1 Tab by mouth  2 times a day for 7 days., Disp: 14 Tab, Rfl: 0  •  pravastatin (PRAVACHOL) 40 MG tablet, TAKE ONE TABLET BY MOUTH DAILY, Disp: 30 Tab, Rfl: 11  •  alendronate (FOSAMAX) 70 MG Tab, Take 70 mg by mouth every 7 days. Thursdays, Disp: , Rfl:   •  gabapentin (NEURONTIN) 300 MG Cap, Take 300 mg by mouth 2 Times a Day., Disp: , Rfl:   •  azithromycin (ZITHROMAX) 250 MG Tab, Take 250-500 mg by mouth every day. Pt started a 5 day course on 5/26, Disp: , Rfl:   •  ondansetron (ZOFRAN) 4 MG Tab tablet, Take 1 Tab by mouth every 8 hours as needed (FOR NAUSEA OR VIMITING) for up to 6 doses., Disp: 6 Each, Rfl: 2  •  loratadine (CLARITIN) 10 MG Tab, TAKE ONE TABLET BY MOUTH DAILY, Disp: 30 Tab, Rfl: 11  •  sumatriptan (IMITREX) 50 MG Tab, TAKE ONE TABLET BY MOUTH AT ONSET OF HEADACHE; MAY REPEAT ONE TABLET IN 2 HOURS AS NEEDED. MAX OF 2 DOSES A DAY, Disp: 9 Tab, Rfl: 6  •  levothyroxine (SYNTHROID) 50 MCG Tab, TAKE ONE TABLET BY MOUTH EVERY MORNING ON AN EMPTY STOMACH, Disp: 30 Tab, Rfl: 11  •  tramadol (ULTRAM) 50 MG Tab, Take 1 Tab by mouth every four hours as needed for Moderate Pain., Disp: 90 Tab, Rfl: 3  •  Cholecalciferol (VITAMIN D-3) 1000 UNITS Cap, Take 2,000 Units by mouth every day., Disp: , Rfl:   •  ibuprofen (MOTRIN) 200 MG Tab, Take 600 mg by mouth every four hours as needed for Mild Pain., Disp: , Rfl:   •  sulfamethoxazole-trimethoprim (BACTRIM DS) 800-160 MG tablet, Take 1 Tab by mouth 2 times a day., Disp: 10 Tab, Rfl: 0  ALLERGIES:   Allergies   Allergen Reactions   • Other Food Anaphylaxis     Wine coffee   • Shellfish Allergy Anaphylaxis     RXN=whole life   • Lidocaine (Anorectal) [Topicaine] Rash and Itching      patch glue       • Pet [Cat Hair Extract] Hives     RXN=whole life   • Pollen Extract Itching     RXN=whole life   • Erythromycin Vomiting     SURGHX:   Past Surgical History:   Procedure Laterality Date   • CARPAL TUNNEL RELEASE Right 1/20/2017    Procedure: CARPAL TUNNEL RELEASE;  Surgeon:  "Jono Maynard M.D.;  Location: SURGERY [facility];  Service:    • PB INJECT NERV ALEX YEN MULTPL  12/12/2016    Procedure: INJ-INTERCOSTAL MULTIPLE - T9, T10;  Surgeon: Rafat Nichols D.O.;  Location: SURGERY [facility];  Service: Pain Management   • WRIST ORIF Right 8/10/2016    Procedure: WRIST ORIF;  Surgeon: Jono Maynard M.D.;  Location: SURGERY [facility];  Service:    • ABDOMINAL HYSTERECTOMY TOTAL     • GYN SURGERY      c sections x 2   • GYN SURGERY      multiple D&C's   • HEMORRHOIDECTOMY     • TONSILLECTOMY       SOCHX:  reports that she has never smoked. She has never used smokeless tobacco. She reports that she does not drink alcohol or use drugs.  FH: family history includes Cancer in her son; Diabetes in her mother; Heart Disease in her father.       Objective:     /64   Pulse 75   Temp 36.2 °C (97.1 °F)   Resp 14   Ht 1.702 m (5' 7\")   Wt 50.8 kg (112 lb)   LMP 08/28/2002   SpO2 97%   BMI 17.54 kg/m²      Physical Exam   Constitutional: She appears well-developed and well-nourished. She is cooperative.  Non-toxic appearance. She does not appear ill. No distress.   HENT:   Head: Normocephalic.   Right Ear: External ear normal.   Left Ear: External ear normal.   Nose: No mucosal edema, rhinorrhea or nasal deformity. Right sinus exhibits maxillary sinus tenderness. Left sinus exhibits no maxillary sinus tenderness.   Mouth/Throat: Uvula is midline and mucous membranes are normal. No oral lesions. No trismus in the jaw. No dental abscesses, uvula swelling or dental caries. No oropharyngeal exudate, posterior oropharyngeal edema or posterior oropharyngeal erythema.   Eyes: Conjunctivae, EOM and lids are normal. Pupils are equal, round, and reactive to light.   Neck: Trachea normal. Neck supple.   Cardiovascular: Normal rate, regular rhythm and normal heart sounds.    No murmur heard.  Pulmonary/Chest: Effort normal and breath sounds normal.   Lymphadenopathy: "        Head (right side): No preauricular adenopathy present.        Head (left side): No preauricular adenopathy present.     She has no cervical adenopathy.   Neurological: She is alert. No sensory deficit.   No facial palsy   Skin: Skin is warm, dry and intact. No rash noted.        Psychiatric: She has a normal mood and affect.               Assessment/Plan:     1. Right facial swelling  Uncertain etiology, but will cover with antibiotic due to risk associated with mid facial location.  Advised need for prompt follow-up if any worsening, recheck in 2 days if not resolving.  - doxycycline (VIBRAMYCIN) 100 MG Tab; Take 1 Tab by mouth 2 times a day for 7 days.  Dispense: 14 Tab; Refill: 0

## 2017-09-27 ENCOUNTER — OFFICE VISIT (OUTPATIENT)
Dept: MEDICAL GROUP | Facility: CLINIC | Age: 57
End: 2017-09-27
Payer: COMMERCIAL

## 2017-09-27 VITALS
TEMPERATURE: 97.8 F | DIASTOLIC BLOOD PRESSURE: 60 MMHG | SYSTOLIC BLOOD PRESSURE: 108 MMHG | WEIGHT: 136.8 LBS | HEART RATE: 82 BPM | BODY MASS INDEX: 21.47 KG/M2 | RESPIRATION RATE: 18 BRPM | OXYGEN SATURATION: 96 % | HEIGHT: 67 IN

## 2017-09-27 DIAGNOSIS — M62.830 BACK SPASM: ICD-10-CM

## 2017-09-27 DIAGNOSIS — M25.531 RIGHT WRIST PAIN: ICD-10-CM

## 2017-09-27 DIAGNOSIS — R07.81 RIB PAIN ON LEFT SIDE: ICD-10-CM

## 2017-09-27 PROCEDURE — 99214 OFFICE O/P EST MOD 30 MIN: CPT | Performed by: INTERNAL MEDICINE

## 2017-09-27 RX ORDER — TRAMADOL HYDROCHLORIDE 50 MG/1
50 TABLET ORAL EVERY 4 HOURS PRN
Qty: 90 TAB | Refills: 3 | Status: SHIPPED | OUTPATIENT
Start: 2017-09-27 | End: 2020-01-12

## 2017-09-27 RX ORDER — GABAPENTIN 300 MG/1
300 CAPSULE ORAL 3 TIMES DAILY
Qty: 90 CAP | Refills: 11 | Status: SHIPPED | OUTPATIENT
Start: 2017-09-27 | End: 2019-02-04 | Stop reason: SDUPTHER

## 2017-09-27 ASSESSMENT — ENCOUNTER SYMPTOMS: DEPRESSION: 1

## 2017-09-27 ASSESSMENT — LIFESTYLE VARIABLES: HISTORY_ALCOHOL_USE: 0

## 2017-09-28 NOTE — PROGRESS NOTES
CC: Evelyn Meeks is a 57 y.o. female is suffering from   Chief Complaint   Patient presents with   • Follow-Up         SUBJECTIVE:  1. Back spasm  Griselda is here for follow-up, continues to have problems with back spasm, continues to use Ultram without significant complication. We discussed the addition of Neurontin.     2. Rib pain on left side  Patient with rib pain on the left hand side, states that it also occurred on the right-hand side. I'm concerned that she may have a thoracic spine lesion present.     3. Right wrist pain  Patient with right wrist pain, is to undergo surgical hardware removal and additional hand surgery, with 2 different surgeons, difficulty with timing there is surgical schedules.     Chronic pain recheck:   Last dose of controlled substance: Today  Chronic pain treated with tramadol taken 2-3 times a day    She  reports that she does not drink alcohol.  She  reports that she does not use drugs.    Consequences of Chronic Opiate therapy:  (5 A's)  Analgesia: Compared to no treatment or prior treatment, pain is currently improved  Activity: improved  Adverse Events: She denies constipation, dry mouth, itchy skin, nausea, sedation and none  Aberrant Behaviors: She reports she is taking medication as prescribed and is not veering from agreed treatment regimen. There have been no inappropriate refills or lost/stolen meds reported.   Affect/Mood: Pain is not impacting patient's mood.  Patient denies depression/anxiety.    Nonnarcotic treatments that are being used: Gabapentin.   Treatment goals discussed.    Last order of CONTROLLED SUBSTANCE TREATMENT AGREEMENT was found on 9/27/2017 from Office Visit on 9/27/2017     UDS Summary     Patient has no health maintenance due at this time        Most recent UDS reviewed today and is consistent with prescribed medications.   Opioid Risk Score: 1    Interpretation of Opioid Risk Score   Score 0-3 = Low risk of abuse. Do UDS at least once per  year.  Score 4-7 = Moderate risk of abuse. Do UDS 1-4 times per year.  Score 8+ = High risk of abuse. Refer to specialist.      I have reviewed the medical records, the Prescription Monitoring Program and I have determined that controlled substance treatment is medically indicated.      Past social, family, history:   Social History   Substance Use Topics   • Smoking status: Never Smoker   • Smokeless tobacco: Never Used   • Alcohol use No         MEDICATIONS:    Current Outpatient Prescriptions:   •  gabapentin (NEURONTIN) 300 MG Cap, Take 1 Cap by mouth 3 times a day., Disp: 90 Cap, Rfl: 11  •  tramadol (ULTRAM) 50 MG Tab, Take 1 Tab by mouth every four hours as needed for Moderate Pain., Disp: 90 Tab, Rfl: 3  •  pravastatin (PRAVACHOL) 40 MG tablet, TAKE ONE TABLET BY MOUTH DAILY, Disp: 30 Tab, Rfl: 11  •  alendronate (FOSAMAX) 70 MG Tab, Take 70 mg by mouth every 7 days. Thursdays, Disp: , Rfl:   •  ondansetron (ZOFRAN) 4 MG Tab tablet, Take 1 Tab by mouth every 8 hours as needed (FOR NAUSEA OR VIMITING) for up to 6 doses., Disp: 6 Each, Rfl: 2  •  loratadine (CLARITIN) 10 MG Tab, TAKE ONE TABLET BY MOUTH DAILY, Disp: 30 Tab, Rfl: 11  •  sumatriptan (IMITREX) 50 MG Tab, TAKE ONE TABLET BY MOUTH AT ONSET OF HEADACHE; MAY REPEAT ONE TABLET IN 2 HOURS AS NEEDED. MAX OF 2 DOSES A DAY, Disp: 9 Tab, Rfl: 6  •  levothyroxine (SYNTHROID) 50 MCG Tab, TAKE ONE TABLET BY MOUTH EVERY MORNING ON AN EMPTY STOMACH, Disp: 30 Tab, Rfl: 11  •  Cholecalciferol (VITAMIN D-3) 1000 UNITS Cap, Take 2,000 Units by mouth every day., Disp: , Rfl:   •  ibuprofen (MOTRIN) 200 MG Tab, Take 600 mg by mouth every four hours as needed for Mild Pain., Disp: , Rfl:   •  sulfamethoxazole-trimethoprim (BACTRIM DS) 800-160 MG tablet, Take 1 Tab by mouth 2 times a day., Disp: 10 Tab, Rfl: 0  •  azithromycin (ZITHROMAX) 250 MG Tab, Take 250-500 mg by mouth every day. Pt started a 5 day course on 5/26, Disp: , Rfl:     PROBLEMS:  Patient Active  "Problem List    Diagnosis Date Noted   • Carpal tunnel syndrome on right 01/20/2017   • Thoracic back pain 12/12/2016   • Closed fracture of distal end of right radius 08/10/2016   • Other specified hypothyroidism 07/19/2016   • Osteoporosis 01/15/2016   • Grief at loss of child 08/04/2015   • ADD (attention deficit disorder) 03/26/2013   • Gastroesophageal reflux disease 09/07/2012   • Anal fissure 10/06/2011   • Dyslipidemia 08/19/2011       REVIEW OF SYSTEMS:  Gen.:  No Nausea, Vomiting, fever, Chills.  Heart: No chest pain.  Lungs:  No shortness of Breath.  Psychological: Geo unusual Anxiety depression     PHYSICAL EXAM   Constitutional: Alert, cooperative, not in acute distress.  Cardiovascular:  Rate Rhythm is regular without murmurs rubs clicks.     Thorax & Lungs: Clear to auscultation, no wheezing, rhonchi, or rales  HENT: Normocephalic, Atraumatic.  Eyes: PERRLA, EOMI, Conjunctiva normal.   Neck: Trachia is midline no swelling of the thyroid.   Lymphatic: No lymphadenopathy noted.   Abdomin: Soft non-tender, no rebound, no guarding.   Skin: Warm, Dry, No erythema, No rash.   Extremities: Atraumatic with symmetric distal pulses, No edema, No tenderness, No cyanosis, No clubbing.   Musculoskeletal:Ongoing right wrist pain, pain to palpation approximately T9 left side axillary line, right side also to palpation  Neurologic: Alert & oriented x 3, cranial nerves II through XII are intact, Normal motor function, Normal sensory function, No focal deficits noted.   Psychiatric: Affect normal, Judgment normal, Mood normal.     VITAL SIGNS:/60   Pulse 82   Temp 36.6 °C (97.8 °F)   Resp 18   Ht 1.702 m (5' 7\")   Wt 62.1 kg (136 lb 12.8 oz)   LMP 08/28/2002   SpO2 96%   Breastfeeding? No   BMI 21.43 kg/m²     Labs: Reviewed    Assessment:                                                     Plan:    1. Back spasm  Back spasm start Neurontin  - gabapentin (NEURONTIN) 300 MG Cap; Take 1 Cap by mouth 3 " times a day.  Dispense: 90 Cap; Refill: 11    2. Rib pain on left side  Continue tramadol x-rays ordered of the thoracic spine also chest x-ray  - tramadol (ULTRAM) 50 MG Tab; Take 1 Tab by mouth every four hours as needed for Moderate Pain.  Dispense: 90 Tab; Refill: 3  - DX-THORACIC SPINE-2 VIEWS; Future  - DX-CHEST-2 VIEWS; Future    3. Right wrist pain  Continued tramadol surgery to be scheduled through [facility]  - Controlled Substance Treatment Agreement  - [facility] PAIN MANAGEMENT SCREEN; Future

## 2017-10-04 ENCOUNTER — HOSPITAL ENCOUNTER (OUTPATIENT)
Dept: RADIOLOGY | Facility: MEDICAL CENTER | Age: 57
End: 2017-10-04
Attending: INTERNAL MEDICINE
Payer: COMMERCIAL

## 2017-10-04 DIAGNOSIS — R07.81 RIB PAIN ON LEFT SIDE: ICD-10-CM

## 2017-10-04 PROCEDURE — 71020 DX-CHEST-2 VIEWS: CPT

## 2017-10-04 PROCEDURE — 72072 X-RAY EXAM THORAC SPINE 3VWS: CPT

## 2017-10-13 ENCOUNTER — HOSPITAL ENCOUNTER (OUTPATIENT)
Dept: RADIOLOGY | Facility: MEDICAL CENTER | Age: 57
End: 2017-10-13
Attending: INTERNAL MEDICINE
Payer: COMMERCIAL

## 2017-10-13 DIAGNOSIS — Z12.31 SCREENING MAMMOGRAM, ENCOUNTER FOR: ICD-10-CM

## 2017-10-13 PROCEDURE — G0202 SCR MAMMO BI INCL CAD: HCPCS

## 2017-10-19 ENCOUNTER — IMMUNIZATION (OUTPATIENT)
Dept: OCCUPATIONAL MEDICINE | Facility: CLINIC | Age: 57
End: 2017-10-19
Payer: COMMERCIAL

## 2017-10-19 ENCOUNTER — IMMUNIZATION (OUTPATIENT)
Dept: OCCUPATIONAL MEDICINE | Facility: CLINIC | Age: 57
End: 2017-10-19

## 2017-10-19 DIAGNOSIS — Z23 NEED FOR VACCINATION: ICD-10-CM

## 2017-10-19 PROCEDURE — 90686 IIV4 VACC NO PRSV 0.5 ML IM: CPT | Performed by: PREVENTIVE MEDICINE

## 2017-11-20 ENCOUNTER — APPOINTMENT (OUTPATIENT)
Dept: ADMISSIONS | Facility: MEDICAL CENTER | Age: 57
End: 2017-11-20
Attending: ORTHOPAEDIC SURGERY
Payer: COMMERCIAL

## 2017-11-21 ENCOUNTER — HOSPITAL ENCOUNTER (OUTPATIENT)
Facility: MEDICAL CENTER | Age: 57
End: 2017-11-21
Attending: ORTHOPAEDIC SURGERY | Admitting: ORTHOPAEDIC SURGERY
Payer: COMMERCIAL

## 2017-11-21 VITALS
WEIGHT: 134.92 LBS | SYSTOLIC BLOOD PRESSURE: 109 MMHG | RESPIRATION RATE: 16 BRPM | TEMPERATURE: 97.7 F | BODY MASS INDEX: 20.45 KG/M2 | DIASTOLIC BLOOD PRESSURE: 67 MMHG | HEIGHT: 68 IN | OXYGEN SATURATION: 93 % | HEART RATE: 65 BPM

## 2017-11-21 PROCEDURE — 700101 HCHG RX REV CODE 250

## 2017-11-21 PROCEDURE — 501838 HCHG SUTURE GENERAL: Performed by: ORTHOPAEDIC SURGERY

## 2017-11-21 PROCEDURE — 700111 HCHG RX REV CODE 636 W/ 250 OVERRIDE (IP)

## 2017-11-21 PROCEDURE — 160028 HCHG SURGERY MINUTES - 1ST 30 MINS LEVEL 3: Performed by: ORTHOPAEDIC SURGERY

## 2017-11-21 PROCEDURE — 160046 HCHG PACU - 1ST 60 MINS PHASE II: Performed by: ORTHOPAEDIC SURGERY

## 2017-11-21 PROCEDURE — A4565 SLINGS: HCPCS | Performed by: ORTHOPAEDIC SURGERY

## 2017-11-21 PROCEDURE — 160039 HCHG SURGERY MINUTES - EA ADDL 1 MIN LEVEL 3: Performed by: ORTHOPAEDIC SURGERY

## 2017-11-21 PROCEDURE — 700102 HCHG RX REV CODE 250 W/ 637 OVERRIDE(OP): Performed by: ORTHOPAEDIC SURGERY

## 2017-11-21 PROCEDURE — 502576 HCHG PACK, HAND: Performed by: ORTHOPAEDIC SURGERY

## 2017-11-21 PROCEDURE — A9270 NON-COVERED ITEM OR SERVICE: HCPCS

## 2017-11-21 PROCEDURE — 700102 HCHG RX REV CODE 250 W/ 637 OVERRIDE(OP)

## 2017-11-21 PROCEDURE — 160036 HCHG PACU - EA ADDL 30 MINS PHASE I: Performed by: ORTHOPAEDIC SURGERY

## 2017-11-21 PROCEDURE — 160009 HCHG ANES TIME/MIN: Performed by: ORTHOPAEDIC SURGERY

## 2017-11-21 PROCEDURE — 500126 HCHG BOVIE, NEEDLE TIP: Performed by: ORTHOPAEDIC SURGERY

## 2017-11-21 PROCEDURE — A6222 GAUZE <=16 IN NO W/SAL W/O B: HCPCS | Performed by: ORTHOPAEDIC SURGERY

## 2017-11-21 PROCEDURE — 700111 HCHG RX REV CODE 636 W/ 250 OVERRIDE (IP): Performed by: ORTHOPAEDIC SURGERY

## 2017-11-21 PROCEDURE — 160035 HCHG PACU - 1ST 60 MINS PHASE I: Performed by: ORTHOPAEDIC SURGERY

## 2017-11-21 PROCEDURE — C9353 NEURAWRAP NERVE PROTECTOR,CM: HCPCS | Performed by: ORTHOPAEDIC SURGERY

## 2017-11-21 PROCEDURE — 160025 RECOVERY II MINUTES (STATS): Performed by: ORTHOPAEDIC SURGERY

## 2017-11-21 PROCEDURE — 700105 HCHG RX REV CODE 258: Performed by: ORTHOPAEDIC SURGERY

## 2017-11-21 PROCEDURE — 160047 HCHG PACU  - EA ADDL 30 MINS PHASE II: Performed by: ORTHOPAEDIC SURGERY

## 2017-11-21 PROCEDURE — 160002 HCHG RECOVERY MINUTES (STAT): Performed by: ORTHOPAEDIC SURGERY

## 2017-11-21 PROCEDURE — A6402 STERILE GAUZE <= 16 SQ IN: HCPCS | Performed by: ORTHOPAEDIC SURGERY

## 2017-11-21 PROCEDURE — A9270 NON-COVERED ITEM OR SERVICE: HCPCS | Performed by: ORTHOPAEDIC SURGERY

## 2017-11-21 PROCEDURE — 160048 HCHG OR STATISTICAL LEVEL 1-5: Performed by: ORTHOPAEDIC SURGERY

## 2017-11-21 PROCEDURE — 500881 HCHG PACK, EXTREMITY: Performed by: ORTHOPAEDIC SURGERY

## 2017-11-21 DEVICE — IMPLANTABLE DEVICE: Type: IMPLANTABLE DEVICE | Status: FUNCTIONAL

## 2017-11-21 RX ORDER — HALOPERIDOL 5 MG/ML
1 INJECTION INTRAMUSCULAR EVERY 6 HOURS PRN
Status: DISCONTINUED | OUTPATIENT
Start: 2017-11-21 | End: 2017-11-21 | Stop reason: HOSPADM

## 2017-11-21 RX ORDER — OXYCODONE HCL 5 MG/5 ML
SOLUTION, ORAL ORAL
Status: DISPENSED
Start: 2017-11-21 | End: 2017-11-22

## 2017-11-21 RX ORDER — ONDANSETRON 2 MG/ML
4 INJECTION INTRAMUSCULAR; INTRAVENOUS EVERY 4 HOURS PRN
Status: DISCONTINUED | OUTPATIENT
Start: 2017-11-21 | End: 2017-11-21 | Stop reason: HOSPADM

## 2017-11-21 RX ORDER — BUPIVACAINE HYDROCHLORIDE 5 MG/ML
INJECTION, SOLUTION EPIDURAL; INTRACAUDAL
Status: DISCONTINUED | OUTPATIENT
Start: 2017-11-21 | End: 2017-11-21 | Stop reason: HOSPADM

## 2017-11-21 RX ORDER — SODIUM CHLORIDE, SODIUM LACTATE, POTASSIUM CHLORIDE, CALCIUM CHLORIDE 600; 310; 30; 20 MG/100ML; MG/100ML; MG/100ML; MG/100ML
1000 INJECTION, SOLUTION INTRAVENOUS
Status: DISCONTINUED | OUTPATIENT
Start: 2017-11-21 | End: 2017-11-21 | Stop reason: HOSPADM

## 2017-11-21 RX ORDER — OXYCODONE HCL 5 MG/5 ML
SOLUTION, ORAL ORAL
Status: COMPLETED
Start: 2017-11-21 | End: 2017-11-21

## 2017-11-21 RX ORDER — SCOLOPAMINE TRANSDERMAL SYSTEM 1 MG/1
1 PATCH, EXTENDED RELEASE TRANSDERMAL
Status: DISCONTINUED | OUTPATIENT
Start: 2017-11-21 | End: 2017-11-21 | Stop reason: HOSPADM

## 2017-11-21 RX ORDER — OXYCODONE HYDROCHLORIDE 5 MG/1
5 TABLET ORAL
Status: DISCONTINUED | OUTPATIENT
Start: 2017-11-21 | End: 2017-11-21 | Stop reason: HOSPADM

## 2017-11-21 RX ORDER — DIPHENHYDRAMINE HYDROCHLORIDE 50 MG/ML
25 INJECTION INTRAMUSCULAR; INTRAVENOUS EVERY 6 HOURS PRN
Status: DISCONTINUED | OUTPATIENT
Start: 2017-11-21 | End: 2017-11-21 | Stop reason: HOSPADM

## 2017-11-21 RX ADMIN — SODIUM CHLORIDE, POTASSIUM CHLORIDE, SODIUM LACTATE AND CALCIUM CHLORIDE 1000 ML: 600; 310; 30; 20 INJECTION, SOLUTION INTRAVENOUS at 13:15

## 2017-11-21 RX ADMIN — HYDROMORPHONE HYDROCHLORIDE 0.5 MG: 1 INJECTION, SOLUTION INTRAMUSCULAR; INTRAVENOUS; SUBCUTANEOUS at 17:10

## 2017-11-21 RX ADMIN — FENTANYL CITRATE 50 MCG: 50 INJECTION, SOLUTION INTRAMUSCULAR; INTRAVENOUS at 17:05

## 2017-11-21 RX ADMIN — OXYCODONE HYDROCHLORIDE 10 MG: 5 SOLUTION ORAL at 17:05

## 2017-11-21 RX ADMIN — FENTANYL CITRATE 50 MCG: 50 INJECTION, SOLUTION INTRAMUSCULAR; INTRAVENOUS at 17:31

## 2017-11-21 ASSESSMENT — PAIN SCALES - GENERAL
PAINLEVEL_OUTOF10: 7
PAINLEVEL_OUTOF10: 4
PAINLEVEL_OUTOF10: 9
PAINLEVEL_OUTOF10: 8
PAINLEVEL_OUTOF10: 0
PAINLEVEL_OUTOF10: 4

## 2017-11-21 NOTE — OR SURGEON
Immediate Post OP Note    PreOp Diagnosis: symptomatic hwr r wrist; cts, guyon's canal compression    PostOp Diagnosis: same    Procedure(s):  CARPAL TUNNEL RELEASE - REVISION  GUYONS TUNNEL RELEASE  HARDWARE REMOVAL ORTHO - OIC DISTAL RADIUS PLATE    Surgeon(s):  Lizandro Weber M.D.    Anesthesiologist/Type of Anesthesia:  Anesthesiologist: Kamari Shea M.D./* No anesthesia type entered *    Surgical Staff:  Circulator: Taty Waldrop R.N.  Scrub Person: Suhail Armstrong    Specimens:  * No specimens in log *    Estimated Blood Loss: min    Findings: healed fracture    Complications: none        11/21/2017 2:47 PM Lizandro Weber

## 2017-11-22 NOTE — OR NURSING
1758: Settled in recliner post short ambulation from mary, pain is tolerable, no nausea, dressing is CDI.  1810: Pt up to the RR, stand-by assist only, needed no help in RR, tolerated it well.  1833: D/Armando to care of family post uneventful stay in PACU 2.

## 2017-11-22 NOTE — DISCHARGE INSTRUCTIONS
ACTIVITY: Rest and take it easy for the first 24 hours.  A responsible adult is recommended to remain with you during that time.  It is normal to feel sleepy.  We encourage you to not do anything that requires balance, judgment or coordination.    MILD FLU-LIKE SYMPTOMS ARE NORMAL. YOU MAY EXPERIENCE GENERALIZED MUSCLE ACHES, THROAT IRRITATION, HEADACHE AND/OR SOME NAUSEA.    FOR 24 HOURS DO NOT:  Drive, operate machinery or run household appliances.  Drink beer or alcoholic beverages.   Make important decisions or sign legal documents.    SPECIAL INSTRUCTIONS: Keep dressing clean and dry   Elevate extremity   Keep dressing on until next appointment   Move all fingers often.***    DIET: To avoid nausea, slowly advance diet as tolerated, avoiding spicy or greasy foods for the first day.  Add more substantial food to your diet according to your physician's instructions.  Babies can be fed formula or breast milk as soon as they are hungry.  INCREASE FLUIDS AND FIBER TO AVOID CONSTIPATION.        FOLLOW-UP APPOINTMENT:  A follow-up appointment should be arranged with your doctor.  *Call if not already scheduled.**;     You should CALL YOUR PHYSICIAN if you develop:  Fever greater than 101 degrees F.  Pain not relieved by medication, or persistent nausea or vomiting.  Excessive bleeding (blood soaking through dressing) or unexpected drainage from the wound.  Extreme redness or swelling around the incision site, drainage of pus or foul smelling drainage.  Inability to urinate or empty your bladder within 8 hours.  Problems with breathing or chest pain.    You should call 911 if you develop problems with breathing or chest pain.  If you are unable to contact your doctor or surgical center, you should go to the nearest emergency room or urgent care center.  Physician's telephone #: **[phone]*    If any questions arise, call your doctor.  If your doctor is not available, please feel free to call the Surgical Center at  [phone].  The Center is open Monday through Friday from 7AM to 7PM.  You can also call the HEALTH HOTLINE open 24 hours/day, 7 days/week and speak to a nurse at [phone], or toll free at [phone].    A registered nurse may call you a few days after your surgery to see how you are doing after your procedure.    MEDICATIONS: Resume taking daily medication.  Take prescribed pain medication with food.  If no medication is prescribed, you may take non-aspirin pain medication if needed.  PAIN MEDICATION CAN BE VERY CONSTIPATING.  Take a stool softener or laxative such as senokot, pericolace, or milk of magnesia if needed.    Prescription given for *OXYCODONE.   Last pain medication given at **5:05 PM*.    If your physician has prescribed pain medication that includes Acetaminophen (Tylenol), do not take additional Acetaminophen (Tylenol) while taking the prescribed medication.    Depression / Suicide Risk    As you are discharged from this [facility] facility, it is important to learn how to keep safe from harming yourself.    Recognize the warning signs:  · Abrupt changes in personality, positive or negative- including increase in energy   · Giving away possessions  · Change in eating patterns- significant weight changes-  positive or negative  · Change in sleeping patterns- unable to sleep or sleeping all the time   · Unwillingness or inability to communicate  · Depression  · Unusual sadness, discouragement and loneliness  · Talk of wanting to die  · Neglect of personal appearance   · Rebelliousness- reckless behavior  · Withdrawal from people/activities they love  · Confusion- inability to concentrate     If you or a loved one observes any of these behaviors or has concerns about self-harm, here's what you can do:  · Talk about it- your feelings and reasons for harming yourself  · Remove any means that you might use to hurt yourself (examples: pills, rope, extension cords, firearm)  · Get  professional help from the community (Mental Health, Substance Abuse, psychological counseling)  · Do not be alone:Call your Safe Contact- someone whom you trust who will be there for you.  · Call your local CRISIS HOTLINE 504-2108 or 077-949-0406  · Call your local Children's Mobile Crisis Response Team [place] (345) 208-1773 or www.VeriTran  · Call the toll free National Suicide Prevention Hotlines   · National Suicide Prevention Lifeline 669-373-WFHB (9752)  · National Hope Line Network 800-SUICIDE (265-3709)

## 2017-11-22 NOTE — OR NURSING
Respirations easy in PACU.  Dressing clean and dry.  Right arm elevated on pillows.  Fingers warm and mobile with brisk capillary refill.  Ice pack to hand. Pain meds with good effect.  Denies nausea.  Report to PACU 2.

## 2017-11-23 NOTE — OP REPORT
DATE OF SERVICE:  11/21/2017    PREOPERATIVE DIAGNOSES:  1.  Recurrent right carpal tunnel syndrome.  2.  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  3.  Symptomatic hardware.  4.  Flexor tenosynovitis.    POSTOPERATIVE DIAGNOSES:  1.  Recurrent right carpal tunnel syndrome.  2.  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  3.  Symptomatic hardware.  4.  Flexor tenosynovitis.    PROCEDURES:  1.  Right revision open carpal tunnel release.  2.  Guyon's canal release.  3.  Removal of Symptomatic hardware, right wrist.  4.  Debridement of flexor tendons, right wrist.  5.  Application of NeuraWrap, right wrist.    SURGEON:  Lizandro Weber MD    ANESTHESIA:  General.    ESTIMATED BLOOD LOSS:  Minimal.    DRAINS:  None.    COMPLICATIONS:  None.    INDICATIONS:  Patient is a female who had a distal radius fracture fixed by   one of my partners has a symptomatic plate.  She has ongoing pain, numbness,   and tingling.  We tried a cortisone shot and did seem to give her some relief.    EMG did seem to reveal some worsening symptoms, felt to benefit from the   above-mentioned surgery.  Risks, benefits, complications, and alternatives   explained to the patient.  All questions were answered.  No guarantees were   given.  Signed consent was obtained.    PROCEDURE:  Patient was taken to the operating room and laid supine on the   operating table.  All bony prominences were well padded.  Good general was   obtained without difficulty.  Right upper extremity was prepped and draped in   the usual sterile fashion using a ChloraPrep.  Limb was exsanguinated with   elevation, tourniquet raised, total tourniquet time was under an hour.  I   initially started with a carpal tunnel incision extended proximally and   ulnarly through skin and subcutaneous tissues, identified the ulnar nerve,   artery proximal to the wrist crease.  I then traced it distally going all the   way down to its distal branches.  There were some  fibers branches down at the   end that were transected.  The ulnar artery was mobilized.  There were no   other gross abnormalities.  Then, tracking radially from the same incision, I   identified the median nerve.  I then freed it through the scar tissue out   distally was pretty adherent to the underlying soft tissues.  I freed it down   all the way to its distal branches and its recurrent branches of the muscle.    I made a separate incision over the plate through skin and subcutaneous   tissues f the previous incision and retracted the FCR tendon, FPL tendon and   identified the plate, removed all the screws and the plates in their entirety.    I rongeured back some of the soft tissues, curetted the hole.  Given the   plate, I think the plate was irritating the tendons.  There was a lot of   synovitis and some partial fraying of the flexor tendons, especially the FDP   tendon on the more dorsal aspect of the canal.  I cleaned up and debrided all   these tendons.  I followed the median nerve from one incision, then to the   other.  There was a soft tissue branch that came out distally.  I did not   identify a specific palmar cutaneous branch, but was probably within the soft   tissue left intact within the skin bridge to protect the nerve from scarring   back down.  I took an Integra nerve wrap of 5 mm x 4 cm, was sutured it in   place to protect it through scarring to the carpal canal.  Wounds were   irrigated, tourniquet was let down, hemostasis was obtained with   electrocautery.  The skin bridge had adequate vascularity.  The radial wound   was closed with Vicryl and nylon.  The carpal tunnel extended incision was   closed with nylon in a mattress fashion.  Local without epinephrine was   injected.  Sterile dressing of Xeroform, 4x4s, Sof-Rol, and a resting splint   was applied.  All needle and sponge counts were correct.  Patient tolerated   the procedure well and was transferred to recovery room in a stable  condition.       ____________________________________     MD NICK BARKER / FERDINAND    DD:  11/22/2017 19:10:08  DT:  11/22/2017 19:43:25    D#:  [id number]  Job#:  [id number]

## 2017-12-28 ENCOUNTER — OFFICE VISIT (OUTPATIENT)
Dept: MEDICAL GROUP | Facility: CLINIC | Age: 57
End: 2017-12-28
Payer: COMMERCIAL

## 2017-12-28 VITALS
BODY MASS INDEX: 20.94 KG/M2 | TEMPERATURE: 99.1 F | RESPIRATION RATE: 14 BRPM | OXYGEN SATURATION: 94 % | WEIGHT: 133.4 LBS | HEART RATE: 78 BPM | DIASTOLIC BLOOD PRESSURE: 64 MMHG | SYSTOLIC BLOOD PRESSURE: 102 MMHG | HEIGHT: 67 IN

## 2017-12-28 DIAGNOSIS — J06.9 UPPER RESPIRATORY TRACT INFECTION, UNSPECIFIED TYPE: ICD-10-CM

## 2017-12-28 DIAGNOSIS — M25.531 RIGHT WRIST PAIN: ICD-10-CM

## 2017-12-28 LAB
FLUAV+FLUBV AG SPEC QL IA: NEGATIVE
INT CON NEG: NEGATIVE
INT CON POS: POSITIVE

## 2017-12-28 PROCEDURE — 99213 OFFICE O/P EST LOW 20 MIN: CPT | Performed by: INTERNAL MEDICINE

## 2017-12-28 PROCEDURE — 87804 INFLUENZA ASSAY W/OPTIC: CPT | Performed by: INTERNAL MEDICINE

## 2017-12-28 NOTE — PROGRESS NOTES
CC: Evelyn Meeks is a 57 y.o. female is suffering from No chief complaint on file.        SUBJECTIVE:  1. Right wrist pain  Griselda is here for follow-up underwent surgery for right wrist with removal of hardware, states that her numbness and discomfort is significant improved.     2. 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         Past social, family, history:   Social History   Substance Use Topics   • Smoking status: Never Smoker   • Smokeless tobacco: Never Used   • Alcohol use No         MEDICATIONS:    Current Outpatient Prescriptions:   •  gabapentin (NEURONTIN) 300 MG Cap, Take 1 Cap by mouth 3 times a day., Disp: 90 Cap, Rfl: 11  •  tramadol (ULTRAM) 50 MG Tab, Take 1 Tab by mouth every four hours as needed for Moderate Pain., Disp: 90 Tab, Rfl: 3  •  pravastatin (PRAVACHOL) 40 MG tablet, TAKE ONE TABLET BY MOUTH DAILY, Disp: 30 Tab, Rfl: 11  •  alendronate (FOSAMAX) 70 MG Tab, Take 70 mg by mouth every 7 days. Thursdays, Disp: , Rfl:   •  ondansetron (ZOFRAN) 4 MG Tab tablet, Take 1 Tab by mouth every 8 hours as needed (FOR NAUSEA OR VIMITING) for up to 6 doses., Disp: 6 Each, Rfl: 2  •  loratadine (CLARITIN) 10 MG Tab, TAKE ONE TABLET BY MOUTH DAILY, Disp: 30 Tab, Rfl: 11  •  sumatriptan (IMITREX) 50 MG Tab, TAKE ONE TABLET BY MOUTH AT ONSET OF HEADACHE; MAY REPEAT ONE TABLET IN 2 HOURS AS NEEDED. MAX OF 2 DOSES A DAY, Disp: 9 Tab, Rfl: 6  •  levothyroxine (SYNTHROID) 50 MCG Tab, TAKE ONE TABLET BY MOUTH EVERY MORNING ON AN EMPTY STOMACH, Disp: 30 Tab, Rfl: 11  •  Cholecalciferol (VITAMIN D-3) 1000 UNITS Cap, Take 2,000 Units by mouth every day., Disp: , Rfl:   •  ibuprofen (MOTRIN) 200 MG Tab, Take 600 mg by mouth every four hours as needed for Mild Pain., Disp: , Rfl:     PROBLEMS:  Patient Active Problem List    Diagnosis Date Noted   •  "Carpal tunnel syndrome on right 01/20/2017   • Thoracic back pain 12/12/2016   • Closed fracture of distal end of right radius 08/10/2016   • Other specified hypothyroidism 07/19/2016   • Osteoporosis 01/15/2016   • Grief at loss of child 08/04/2015   • ADD (attention deficit disorder) 03/26/2013   • Gastroesophageal reflux disease 09/07/2012   • Anal fissure 10/06/2011   • Dyslipidemia 08/19/2011       REVIEW OF SYSTEMS:  Gen.:  No Nausea, Vomiting, fever, Chills.  Heart: No chest pain.  Lungs:  No shortness of Breath.  Psychological: Geo unusual Anxiety depression     PHYSICAL EXAM   Constitutional: Alert, cooperative, not in acute distress.  Cardiovascular:  Rate Rhythm is regular without murmurs rubs clicks.     Thorax & Lungs: Clear to auscultation, no wheezing, rhonchi, or rales  HENT: Normocephalic, Atraumatic.  Eyes: PERRLA, EOMI, Conjunctiva normal.   Neck: Trachia is midline no swelling of the thyroid.   Lymphatic: No lymphadenopathy noted.   Neurologic: Alert & oriented x 3, cranial nerves II through XII are intact, Normal motor function, Normal sensory function, No focal deficits noted.   Psychiatric: Affect normal, Judgment normal, Mood normal.     VITAL SIGNS:/64   Pulse 78   Temp 37.3 °C (99.1 °F)   Resp 14   Ht 1.702 m (5' 7\")   Wt 60.5 kg (133 lb 6.4 oz)   LMP 08/28/2002   SpO2 94%   Breastfeeding? No   BMI 20.89 kg/m²     Labs: Reviewed    Assessment:                                                     Plan:    1. Right wrist pain  Right wrist pain with significant improvement status post surgery    2. Upper respiratory tract infection, unspecified type  URI likely viral no change in medical therapy  - POCT Influenza A/B        "

## 2018-03-29 ENCOUNTER — TELEPHONE (OUTPATIENT)
Dept: MEDICAL GROUP | Facility: CLINIC | Age: 58
End: 2018-03-29

## 2018-03-29 DIAGNOSIS — G56.01 CARPAL TUNNEL SYNDROME OF RIGHT WRIST: ICD-10-CM

## 2018-04-02 NOTE — TELEPHONE ENCOUNTER
2. SPECIFIC Action To Be Taken: Referral pending, please sign.    3. Diagnosis/Clinical Reason for Request: Follow up visits post surgery     4. Specialty & Provider Name/Lab/Imaging Location: Dr Gus BARLOW     5. Is appointment scheduled for requested order/referral: yes - 04/02/18    Patient informed they will receive a return phone call from the office ONLY if there are any questions before processing their request. Advised to call back if they haven't received a call from the referral department in 5 days.

## 2018-04-02 NOTE — TELEPHONE ENCOUNTER
1. Caller Name: Evelyn Meeks                                           Call Back Number: [phone]        Patient approves a detailed voicemail message: N\A    Patient called and stated that she had called last week to request a referral to be sent in for her appointment with Dr Weber at [facility]. Called today to follow up and stated that [facility] has not received anything. She is scheduled for an appointment today and will need to cancel the appointment if she does not have prior authorization.

## 2018-04-02 NOTE — TELEPHONE ENCOUNTER
Spoke to patient and apologized for the delay in processing. Informed her that a new authorization will have to be submitted and unfortunately Dr Crawford is out of the office and we cannot complete this request today. Patient stated that she will reschedule her follow appointment with YEN.

## 2018-04-02 NOTE — TELEPHONE ENCOUNTER
Called patient care coordination to see if there is anything we can do to push the referral through and Adriana stated that the only referral on file is more than a year old and this patient would need a new referral processed. Suggested for the referral to be sent as urgent for immediate attention. Due to the fact Dr Crawford is out of the office we will not be able to process this request today.

## 2018-04-03 NOTE — TELEPHONE ENCOUNTER
Urgent request to Dr. Ajith Tobias written. Telephone call discussed with the patient told this and been processed.

## 2018-04-06 RX ORDER — LEVOTHYROXINE SODIUM 0.05 MG/1
TABLET ORAL
Qty: 30 TAB | Refills: 5 | Status: SHIPPED | OUTPATIENT
Start: 2018-04-06 | End: 2018-10-10 | Stop reason: SDUPTHER

## 2018-04-17 ENCOUNTER — PATIENT MESSAGE (OUTPATIENT)
Dept: MEDICAL GROUP | Facility: CLINIC | Age: 58
End: 2018-04-17

## 2018-04-17 DIAGNOSIS — J30.2 CHRONIC SEASONAL ALLERGIC RHINITIS, UNSPECIFIED TRIGGER: ICD-10-CM

## 2018-04-17 DIAGNOSIS — N30.01 ACUTE CYSTITIS WITH HEMATURIA: ICD-10-CM

## 2018-04-17 DIAGNOSIS — E78.5 DYSLIPIDEMIA: ICD-10-CM

## 2018-04-17 RX ORDER — NITROFURANTOIN 25; 75 MG/1; MG/1
100 CAPSULE ORAL 2 TIMES DAILY
Qty: 10 CAP | Refills: 0 | Status: SHIPPED | OUTPATIENT
Start: 2018-04-17 | End: 2019-06-11

## 2018-04-17 RX ORDER — PRAVASTATIN SODIUM 40 MG
TABLET ORAL
Qty: 30 TAB | Refills: 11 | Status: SHIPPED | OUTPATIENT
Start: 2018-04-17 | End: 2018-06-20

## 2018-04-17 RX ORDER — LORATADINE 10 MG/1
TABLET ORAL
Qty: 30 TAB | Refills: 11 | Status: SHIPPED | OUTPATIENT
Start: 2018-04-17 | End: 2018-12-17 | Stop reason: SDUPTHER

## 2018-06-06 ENCOUNTER — HOSPITAL ENCOUNTER (OUTPATIENT)
Dept: LAB | Facility: MEDICAL CENTER | Age: 58
End: 2018-06-06
Payer: COMMERCIAL

## 2018-06-06 LAB
BDY FAT % MEASURED: 30.4 %
BP DIAS: 74 MMHG
BP SYS: 111 MMHG
CHOLEST SERPL-MCNC: 293 MG/DL (ref 100–199)
DIABETES HTDIA: NO
EVENT NAME HTEVT: NORMAL
FASTING HTFAS: YES
GLUCOSE SERPL-MCNC: 94 MG/DL (ref 65–99)
HDLC SERPL-MCNC: 77 MG/DL
HYPERTENSION HTHYP: NO
LDLC SERPL CALC-MCNC: 196 MG/DL
SCREENING LOC CITY HTCIT: NORMAL
SCREENING LOC STATE HTSTA: NORMAL
SCREENING LOCATION HTLOC: NORMAL
SMOKING HTSMO: NO
SUBSCRIBER ID HTSID: NORMAL
TRIGL SERPL-MCNC: 102 MG/DL (ref 0–149)

## 2018-06-06 PROCEDURE — 36415 COLL VENOUS BLD VENIPUNCTURE: CPT

## 2018-06-06 PROCEDURE — S5190 WELLNESS ASSESSMENT BY NONPH: HCPCS

## 2018-06-06 PROCEDURE — 82947 ASSAY GLUCOSE BLOOD QUANT: CPT

## 2018-06-06 PROCEDURE — 80061 LIPID PANEL: CPT

## 2018-06-18 ENCOUNTER — OFFICE VISIT (OUTPATIENT)
Dept: MEDICAL GROUP | Facility: CLINIC | Age: 58
End: 2018-06-18
Payer: COMMERCIAL

## 2018-06-18 VITALS
OXYGEN SATURATION: 97 % | BODY MASS INDEX: 20.88 KG/M2 | WEIGHT: 133 LBS | RESPIRATION RATE: 16 BRPM | HEART RATE: 59 BPM | SYSTOLIC BLOOD PRESSURE: 118 MMHG | DIASTOLIC BLOOD PRESSURE: 70 MMHG | TEMPERATURE: 97.8 F | HEIGHT: 67 IN

## 2018-06-18 DIAGNOSIS — E78.5 DYSLIPIDEMIA: ICD-10-CM

## 2018-06-18 PROCEDURE — 99213 OFFICE O/P EST LOW 20 MIN: CPT | Performed by: INTERNAL MEDICINE

## 2018-06-18 NOTE — PROGRESS NOTES
CC: Evelyn Meeks is a 57 y.o. female is suffering from   Chief Complaint   Patient presents with   • Follow-Up     6 months         SUBJECTIVE:  1. Dyslipidemia  Patient is here for follow-up has a history of dyslipidemia.  Discussed with her the need for CT cardiac scoring which is been ordered.        Past social, family, history:   Social History   Substance Use Topics   • Smoking status: Never Smoker   • Smokeless tobacco: Never Used   • Alcohol use No         MEDICATIONS:    Current Outpatient Prescriptions:   •  pravastatin (PRAVACHOL) 40 MG tablet, TAKE ONE TABLET BY MOUTH DAILY, Disp: 30 Tab, Rfl: 11  •  loratadine (CLARITIN) 10 MG Tab, TAKE ONE TABLET BY MOUTH DAILY, Disp: 30 Tab, Rfl: 11  •  levothyroxine (SYNTHROID) 50 MCG Tab, TAKE ONE TABLET BY MOUTH EVERY MORNING ON AN EMPTY STOMACH, Disp: 30 Tab, Rfl: 5  •  gabapentin (NEURONTIN) 300 MG Cap, Take 1 Cap by mouth 3 times a day., Disp: 90 Cap, Rfl: 11  •  alendronate (FOSAMAX) 70 MG Tab, Take 70 mg by mouth every 7 days. Thursdays, Disp: , Rfl:   •  Cholecalciferol (VITAMIN D-3) 1000 UNITS Cap, Take 2,000 Units by mouth every day., Disp: , Rfl:   •  nitrofurantoin monohydr macro (MACROBID) 100 MG Cap, Take 1 Cap by mouth 2 times a day., Disp: 10 Cap, Rfl: 0  •  tramadol (ULTRAM) 50 MG Tab, Take 1 Tab by mouth every four hours as needed for Moderate Pain., Disp: 90 Tab, Rfl: 3  •  ondansetron (ZOFRAN) 4 MG Tab tablet, Take 1 Tab by mouth every 8 hours as needed (FOR NAUSEA OR VIMITING) for up to 6 doses., Disp: 6 Each, Rfl: 2  •  sumatriptan (IMITREX) 50 MG Tab, TAKE ONE TABLET BY MOUTH AT ONSET OF HEADACHE; MAY REPEAT ONE TABLET IN 2 HOURS AS NEEDED. MAX OF 2 DOSES A DAY, Disp: 9 Tab, Rfl: 6  •  ibuprofen (MOTRIN) 200 MG Tab, Take 600 mg by mouth every four hours as needed for Mild Pain., Disp: , Rfl:     PROBLEMS:  Patient Active Problem List    Diagnosis Date Noted   • Carpal tunnel syndrome on right 01/20/2017   • Thoracic back pain  "12/12/2016   • Closed fracture of distal end of right radius 08/10/2016   • Other specified hypothyroidism 07/19/2016   • Osteoporosis 01/15/2016   • Grief at loss of child 08/04/2015   • ADD (attention deficit disorder) 03/26/2013   • Gastroesophageal reflux disease 09/07/2012   • Anal fissure 10/06/2011   • Dyslipidemia 08/19/2011       REVIEW OF SYSTEMS:  Gen.:  No Nausea, Vomiting, fever, Chills.  Heart: No chest pain.  Lungs:  No shortness of Breath.  Psychological: Geo unusual Anxiety depression     PHYSICAL EXAM   Constitutional: Alert, cooperative, not in acute distress.  Cardiovascular:  Rate Rhythm is regular without murmurs rubs clicks.     Thorax & Lungs: Clear to auscultation, no wheezing, rhonchi, or rales  HENT: Normocephalic, Atraumatic.  Eyes: PERRLA, EOMI, Conjunctiva normal.   Neck: Trachia is midline no swelling of the thyroid.   Lymphatic: No lymphadenopathy noted.   Neurologic: Alert & oriented x 3, cranial nerves II through XII are intact, Normal motor function, Normal sensory function, No focal deficits noted.   Psychiatric: Affect normal, Judgment normal, Mood normal.     VITAL SIGNS:/70   Pulse (!) 59   Temp 36.6 °C (97.8 °F)   Resp 16   Ht 1.702 m (5' 7\")   Wt 60.3 kg (133 lb)   LMP 08/28/2002   SpO2 97%   BMI 20.83 kg/m²     Labs: Reviewed    Assessment:                                                     Plan:    1. Dyslipidemia  Patient with elevated cholesterol, patients to undergo CT cardiac scoring.  - CT-CARDIAC SCORING; Future        "

## 2018-06-20 ENCOUNTER — HOSPITAL ENCOUNTER (OUTPATIENT)
Dept: RADIOLOGY | Facility: MEDICAL CENTER | Age: 58
End: 2018-06-20
Attending: INTERNAL MEDICINE
Payer: COMMERCIAL

## 2018-06-20 DIAGNOSIS — E78.5 DYSLIPIDEMIA: ICD-10-CM

## 2018-06-20 DIAGNOSIS — R93.1 ABNORMAL SCREENING CARDIAC CT: ICD-10-CM

## 2018-06-20 PROCEDURE — 4410556 CT-CARDIAC SCORING

## 2018-06-20 RX ORDER — ROSUVASTATIN CALCIUM 20 MG/1
20 TABLET, COATED ORAL EVERY EVENING
Qty: 30 TAB | Refills: 11 | Status: SHIPPED | OUTPATIENT
Start: 2018-06-20 | End: 2018-12-17 | Stop reason: SDUPTHER

## 2018-07-03 ENCOUNTER — HOSPITAL ENCOUNTER (OUTPATIENT)
Dept: CARDIOLOGY | Facility: MEDICAL CENTER | Age: 58
End: 2018-07-03
Attending: INTERNAL MEDICINE
Payer: COMMERCIAL

## 2018-07-03 DIAGNOSIS — R93.1 ABNORMAL SCREENING CARDIAC CT: ICD-10-CM

## 2018-07-03 LAB — LV EJECT FRACT  99904: 65

## 2018-07-03 PROCEDURE — 93017 CV STRESS TEST TRACING ONLY: CPT

## 2018-07-03 PROCEDURE — 93350 STRESS TTE ONLY: CPT | Mod: 26 | Performed by: INTERNAL MEDICINE

## 2018-07-03 PROCEDURE — 93018 CV STRESS TEST I&R ONLY: CPT | Performed by: INTERNAL MEDICINE

## 2018-07-03 PROCEDURE — 93350 STRESS TTE ONLY: CPT

## 2018-07-06 ENCOUNTER — OFFICE VISIT (OUTPATIENT)
Dept: CARDIOLOGY | Facility: MEDICAL CENTER | Age: 58
End: 2018-07-06
Payer: COMMERCIAL

## 2018-07-06 VITALS
WEIGHT: 137 LBS | HEART RATE: 85 BPM | SYSTOLIC BLOOD PRESSURE: 110 MMHG | OXYGEN SATURATION: 94 % | HEIGHT: 67 IN | BODY MASS INDEX: 21.5 KG/M2 | DIASTOLIC BLOOD PRESSURE: 60 MMHG

## 2018-07-06 DIAGNOSIS — R93.1 ELEVATED CORONARY ARTERY CALCIUM SCORE: ICD-10-CM

## 2018-07-06 DIAGNOSIS — E78.5 DYSLIPIDEMIA: ICD-10-CM

## 2018-07-06 PROCEDURE — 99204 OFFICE O/P NEW MOD 45 MIN: CPT | Performed by: INTERNAL MEDICINE

## 2018-07-06 ASSESSMENT — ENCOUNTER SYMPTOMS
CHILLS: 0
CLAUDICATION: 1
BRUISES/BLEEDS EASILY: 1
COUGH: 0
EYE PAIN: 0
EYE DISCHARGE: 0
NERVOUS/ANXIOUS: 0
BLURRED VISION: 0
HEADACHES: 1
DEPRESSION: 0
BACK PAIN: 1
ABDOMINAL PAIN: 0
VOMITING: 0
NAUSEA: 0
SPEECH CHANGE: 0
HEMOPTYSIS: 0
FEVER: 0
PALPITATIONS: 0
WHEEZING: 0
MYALGIAS: 0
LOSS OF CONSCIOUSNESS: 0

## 2018-07-06 NOTE — LETTER
[facility]   [address]   [address]   [place] [address]  Phone: [phone]  Fax: [phone]              Evelyn Meeks  1960    Encounter Date: 7/6/2018    Vaughn Choudhary M.D.          PROGRESS NOTE:  No chief complaint on file.      Subjective:   Evelyn Meeks is a 57 y.o. female who presents today for new patient evaluation with a history of dyslipidemia.  She also has a family history of coronary artery disease at an early age.  She underwent screening with a coronary calcium score which was elevated.    She has had no chest discomfort or dyspnea.  She is noted no edema but does have some leg cramps at night.  She feels that her heart is racing today but has had no other difficulty with palpitations.    She did start rosuvastatin about a week ago.  She was previously on pravastatin and have been having leg cramps for about a month.    Past Medical History:   Diagnosis Date   • Abnormal screening cardiac CT 6/20/2018   • Anal fissure 10/6/2011   • Back pain     left rib pain from fracture 1/2016   • Bronchitis 2015   • Chicken pox     as child   • Disorder of thyroid    • Dyslipidemia 8/19/2011   • Gastroesophageal reflux disease 9/7/2012   • Grief at loss of child 8/4/2015   • Hemorrhoid    • High cholesterol    • Kidney stone    • Measles     as child   • Migraine    • Mumps     as child   • No pertinent past medical history    • Osteoporosis 1/15/2016   • Pain 11/20/2017    left side rib   • Pneumonia 2015   • UTI (lower urinary tract infection) 2014     Past Surgical History:   Procedure Laterality Date   • CARPAL TUNNEL RELEASE Right 11/21/2017    Procedure: CARPAL TUNNEL RELEASE - REVISION;  Surgeon: Lizandro Weber M.D.;  Location: SURGERY [facility];  Service: Orthopedics   • GUYONS TUNNEL RELEASE Right 11/21/2017    Procedure: GUYONS TUNNEL RELEASE;  Surgeon: Lizandro Weber M.D.;  Location:  SURGERY [facility];  Service: Orthopedics   • HARDWARE REMOVAL ORTHO Right 11/21/2017    Procedure: HARDWARE REMOVAL ORTHO - OIC DISTAL RADIUS PLATE;  Surgeon: Lizandro Weber M.D.;  Location: SURGERY [facility];  Service: Orthopedics   • CARPAL TUNNEL RELEASE Right 1/20/2017    Procedure: CARPAL TUNNEL RELEASE;  Surgeon: Jono Maynard M.D.;  Location: SURGERY [facility];  Service:    • PB INJECT NERV BLCK,INTERCOST,MULTPL  12/12/2016    Procedure: INJ-INTERCOSTAL MULTIPLE - T9, T10;  Surgeon: Rafat Nichols D.O.;  Location: SURGERY [facility];  Service: Pain Management   • WRIST ORIF Right 8/10/2016    Procedure: WRIST ORIF;  Surgeon: Jono Maynard M.D.;  Location: SURGERY [facility];  Service:    • ABDOMINAL HYSTERECTOMY TOTAL     • GYN SURGERY      c sections x 2   • GYN SURGERY      multiple D&C's   • HEMORRHOIDECTOMY     • TONSILLECTOMY       Family History   Problem Relation Age of Onset   • Diabetes Mother      complications of DM   • Heart Disease Father      65   • Heart Attack Father 64   • Cancer Son      appendenoma     Social History     Social History   • Marital status:      Spouse name: N/A   • Number of children: N/A   • Years of education: N/A     Occupational History   • Not on file.     Social History Main Topics   • Smoking status: Never Smoker   • Smokeless tobacco: Never Used   • Alcohol use No   • Drug use: No   • Sexual activity: Yes     Partners: Male     Other Topics Concern   • Not on file     Social History Narrative    ** Merged History Encounter **          Allergies   Allergen Reactions   • Other Food Anaphylaxis     Wine coffee   • Shellfish Allergy Anaphylaxis     RXN=whole life   • Lidocaine (Anorectal) [Topicaine] Rash and Itching      patch glue       • Pet [Cat Hair Extract] Hives     RXN=whole life   • Pollen Extract Itching     RXN=whole life   • Tape    • Erythromycin Vomiting     Outpatient Encounter Prescriptions as of 7/6/2018    Medication Sig Dispense Refill   • rosuvastatin (CRESTOR) 20 MG Tab Take 1 Tab by mouth every evening. 30 Tab 11   • loratadine (CLARITIN) 10 MG Tab TAKE ONE TABLET BY MOUTH DAILY 30 Tab 11   • levothyroxine (SYNTHROID) 50 MCG Tab TAKE ONE TABLET BY MOUTH EVERY MORNING ON AN EMPTY STOMACH 30 Tab 5   • Cholecalciferol (VITAMIN D-3) 1000 UNITS Cap Take 2,000 Units by mouth every day.     • ibuprofen (MOTRIN) 200 MG Tab Take 600 mg by mouth every four hours as needed for Mild Pain.     • nitrofurantoin monohydr macro (MACROBID) 100 MG Cap Take 1 Cap by mouth 2 times a day. 10 Cap 0   • gabapentin (NEURONTIN) 300 MG Cap Take 1 Cap by mouth 3 times a day. 90 Cap 11   • tramadol (ULTRAM) 50 MG Tab Take 1 Tab by mouth every four hours as needed for Moderate Pain. 90 Tab 3   • alendronate (FOSAMAX) 70 MG Tab Take 70 mg by mouth every 7 days. Thursdays     • ondansetron (ZOFRAN) 4 MG Tab tablet Take 1 Tab by mouth every 8 hours as needed (FOR NAUSEA OR VIMITING) for up to 6 doses. 6 Each 2   • sumatriptan (IMITREX) 50 MG Tab TAKE ONE TABLET BY MOUTH AT ONSET OF HEADACHE; MAY REPEAT ONE TABLET IN 2 HOURS AS NEEDED. MAX OF 2 DOSES A DAY 9 Tab 6     No facility-administered encounter medications on file as of 7/6/2018.      Review of Systems   Constitutional: Negative for chills and fever.   HENT: Negative for congestion.    Eyes: Negative for blurred vision, pain and discharge.   Respiratory: Negative for cough, hemoptysis and wheezing.    Cardiovascular: Positive for claudication (Occasional cramping with exertion). Negative for chest pain and palpitations.   Gastrointestinal: Negative for abdominal pain, nausea and vomiting.   Musculoskeletal: Positive for back pain. Negative for joint pain and myalgias.   Skin: Negative for itching and rash.   Neurological: Positive for headaches. Negative for speech change and loss of consciousness.   Endo/Heme/Allergies: Positive for environmental allergies. Bruises/bleeds easily.    "  Psychiatric/Behavioral: Negative for depression. The patient is not nervous/anxious.    All other systems reviewed and are negative.       Objective:   /60   Pulse 85   Ht 1.702 m (5' 7\")   Wt 62.1 kg (137 lb)   LMP 08/28/2002   SpO2 94%   BMI 21.46 kg/m²      Physical Exam   Constitutional: She is oriented to person, place, and time. She appears well-developed and well-nourished. No distress.   HENT:   Head: Normocephalic and atraumatic.   Mouth/Throat: Mucous membranes are normal.   Neck: No JVD present. No thyromegaly present.   Cardiovascular: Normal rate, regular rhythm and intact distal pulses.  Exam reveals no gallop.    No murmur heard.  Pulmonary/Chest: Effort normal and breath sounds normal. She has no rales.   Abdominal: Soft. There is no splenomegaly or hepatomegaly.   Musculoskeletal: Normal range of motion. She exhibits no edema.   Lymphadenopathy:     She has no cervical adenopathy.   Neurological: She is alert and oriented to person, place, and time. She has normal strength. She exhibits normal muscle tone.   Skin: Skin is warm and dry. No rash noted.   Psychiatric: She has a normal mood and affect. Her behavior is normal.     Lab Results   Component Value Date/Time    CHOLSTRLTOT 293 (H) 06/06/2018 09:03 AM     (H) 06/06/2018 09:03 AM    HDL 77 06/06/2018 09:03 AM    TRIGLYCERIDE 102 06/06/2018 09:03 AM       Coronary calcification from June 2018:  Coronary calcification:  LMA - 0.0  LCX - 16.2  LAD - 411.7  RCA - 277.1  PDA - 0.0    Calcium score:  705.1    Stress Echo Report  CONCLUSIONS  Negative stress echocardiogram for ischemia with adequate stress   achieved by heart rate criteria.   Excellent exercise tolerance, achieving 12.8 METS.    Exam Date:           07/03/2018     Assessment:     1. Elevated coronary artery calcium score: Approximately 720 18     2. Dyslipidemia         Medical Decision Making:  Today's Assessment / Status / Plan:     Ms. Meeks has a severely " elevated LDL.  Her level of 196 was on pravastatin therapy.  She will have lab work in about 6 weeks and follow-up in a couple of months.  We may need to increase her rosuvastatin and/or add ezetimibe.  If we cannot get adequate control of her LDL, she may need a PC SK-9 inhibitor.  She is asymptomatic from a cardiovascular standpoint and had no evidence of ischemia on her stress echocardiogram at a high workload.      Segundo Crawford D.O.  [address] [facility] [address]  [address]  [facility] [id number]  VIA In Basket

## 2018-07-06 NOTE — PROGRESS NOTES
No chief complaint on file.      Subjective:   Evelyn Meeks is a 57 y.o. female who presents today for new patient evaluation with a history of dyslipidemia.  She also has a family history of coronary artery disease at an early age.  She underwent screening with a coronary calcium score which was elevated.    She has had no chest discomfort or dyspnea.  She is noted no edema but does have some leg cramps at night.  She feels that her heart is racing today but has had no other difficulty with palpitations.    She did start rosuvastatin about a week ago.  She was previously on pravastatin and have been having leg cramps for about a month.    Past Medical History:   Diagnosis Date   • Abnormal screening cardiac CT 6/20/2018   • Anal fissure 10/6/2011   • Back pain     left rib pain from fracture 1/2016   • Bronchitis 2015   • Chicken pox     as child   • Disorder of thyroid    • Dyslipidemia 8/19/2011   • Gastroesophageal reflux disease 9/7/2012   • Grief at loss of child 8/4/2015   • Hemorrhoid    • High cholesterol    • Kidney stone    • Measles     as child   • Migraine    • Mumps     as child   • No pertinent past medical history    • Osteoporosis 1/15/2016   • Pain 11/20/2017    left side rib   • Pneumonia 2015   • UTI (lower urinary tract infection) 2014     Past Surgical History:   Procedure Laterality Date   • CARPAL TUNNEL RELEASE Right 11/21/2017    Procedure: CARPAL TUNNEL RELEASE - REVISION;  Surgeon: Lizandro Weber M.D.;  Location: [facility];  Service: Orthopedics   • GUYONS TUNNEL RELEASE Right 11/21/2017    Procedure: GUYONS TUNNEL RELEASE;  Surgeon: Lizandro Weber M.D.;  Location: [facility];  Service: Orthopedics   • HARDWARE REMOVAL ORTHO Right 11/21/2017    Procedure: HARDWARE REMOVAL ORTHO - OIC DISTAL RADIUS PLATE;  Surgeon: Lizandro Weber M.D.;  Location: [facility];  Service: Orthopedics   • CARPAL TUNNEL RELEASE Right 1/20/2017     Procedure: CARPAL TUNNEL RELEASE;  Surgeon: Jono Maynard M.D.;  Location: SURGERY [facility];  Service:    • PB INJECT NERV FARSHAD,ALEX,LUCITATPL  12/12/2016    Procedure: INJ-INTERCOSTAL MULTIPLE - T9, T10;  Surgeon: Rafat Nichols D.O.;  Location: SURGERY [facility];  Service: Pain Management   • WRIST ORIF Right 8/10/2016    Procedure: WRIST ORIF;  Surgeon: Jono Maynard M.D.;  Location: SURGERY [facility];  Service:    • ABDOMINAL HYSTERECTOMY TOTAL     • GYN SURGERY      c sections x 2   • GYN SURGERY      multiple D&C's   • HEMORRHOIDECTOMY     • TONSILLECTOMY       Family History   Problem Relation Age of Onset   • Diabetes Mother      complications of DM   • Heart Disease Father      65   • Heart Attack Father 64   • Cancer Son      appendenoma     Social History     Social History   • Marital status:      Spouse name: N/A   • Number of children: N/A   • Years of education: N/A     Occupational History   • Not on file.     Social History Main Topics   • Smoking status: Never Smoker   • Smokeless tobacco: Never Used   • Alcohol use No   • Drug use: No   • Sexual activity: Yes     Partners: Male     Other Topics Concern   • Not on file     Social History Narrative    ** Merged History Encounter **          Allergies   Allergen Reactions   • Other Food Anaphylaxis     Wine coffee   • Shellfish Allergy Anaphylaxis     RXN=whole life   • Lidocaine (Anorectal) [Topicaine] Rash and Itching      patch glue       • Pet [Cat Hair Extract] Hives     RXN=whole life   • Pollen Extract Itching     RXN=whole life   • Tape    • Erythromycin Vomiting     Outpatient Encounter Prescriptions as of 7/6/2018   Medication Sig Dispense Refill   • rosuvastatin (CRESTOR) 20 MG Tab Take 1 Tab by mouth every evening. 30 Tab 11   • loratadine (CLARITIN) 10 MG Tab TAKE ONE TABLET BY MOUTH DAILY 30 Tab 11   • levothyroxine (SYNTHROID) 50 MCG Tab TAKE ONE TABLET BY MOUTH EVERY MORNING ON AN EMPTY STOMACH  "30 Tab 5   • Cholecalciferol (VITAMIN D-3) 1000 UNITS Cap Take 2,000 Units by mouth every day.     • ibuprofen (MOTRIN) 200 MG Tab Take 600 mg by mouth every four hours as needed for Mild Pain.     • nitrofurantoin monohydr macro (MACROBID) 100 MG Cap Take 1 Cap by mouth 2 times a day. 10 Cap 0   • gabapentin (NEURONTIN) 300 MG Cap Take 1 Cap by mouth 3 times a day. 90 Cap 11   • tramadol (ULTRAM) 50 MG Tab Take 1 Tab by mouth every four hours as needed for Moderate Pain. 90 Tab 3   • alendronate (FOSAMAX) 70 MG Tab Take 70 mg by mouth every 7 days. Thursdays     • ondansetron (ZOFRAN) 4 MG Tab tablet Take 1 Tab by mouth every 8 hours as needed (FOR NAUSEA OR VIMITING) for up to 6 doses. 6 Each 2   • sumatriptan (IMITREX) 50 MG Tab TAKE ONE TABLET BY MOUTH AT ONSET OF HEADACHE; MAY REPEAT ONE TABLET IN 2 HOURS AS NEEDED. MAX OF 2 DOSES A DAY 9 Tab 6     No facility-administered encounter medications on file as of 7/6/2018.      Review of Systems   Constitutional: Negative for chills and fever.   HENT: Negative for congestion.    Eyes: Negative for blurred vision, pain and discharge.   Respiratory: Negative for cough, hemoptysis and wheezing.    Cardiovascular: Positive for claudication (Occasional cramping with exertion). Negative for chest pain and palpitations.   Gastrointestinal: Negative for abdominal pain, nausea and vomiting.   Musculoskeletal: Positive for back pain. Negative for joint pain and myalgias.   Skin: Negative for itching and rash.   Neurological: Positive for headaches. Negative for speech change and loss of consciousness.   Endo/Heme/Allergies: Positive for environmental allergies. Bruises/bleeds easily.   Psychiatric/Behavioral: Negative for depression. The patient is not nervous/anxious.    All other systems reviewed and are negative.       Objective:   /60   Pulse 85   Ht 1.702 m (5' 7\")   Wt 62.1 kg (137 lb)   LMP 08/28/2002   SpO2 94%   BMI 21.46 kg/m²     Physical Exam "   Constitutional: She is oriented to person, place, and time. She appears well-developed and well-nourished. No distress.   HENT:   Head: Normocephalic and atraumatic.   Mouth/Throat: Mucous membranes are normal.   Neck: No JVD present. No thyromegaly present.   Cardiovascular: Normal rate, regular rhythm and intact distal pulses.  Exam reveals no gallop.    No murmur heard.  Pulmonary/Chest: Effort normal and breath sounds normal. She has no rales.   Abdominal: Soft. There is no splenomegaly or hepatomegaly.   Musculoskeletal: Normal range of motion. She exhibits no edema.   Lymphadenopathy:     She has no cervical adenopathy.   Neurological: She is alert and oriented to person, place, and time. She has normal strength. She exhibits normal muscle tone.   Skin: Skin is warm and dry. No rash noted.   Psychiatric: She has a normal mood and affect. Her behavior is normal.     Lab Results   Component Value Date/Time    CHOLSTRLTOT 293 (H) 06/06/2018 09:03 AM     (H) 06/06/2018 09:03 AM    HDL 77 06/06/2018 09:03 AM    TRIGLYCERIDE 102 06/06/2018 09:03 AM       Coronary calcification from June 2018:  Coronary calcification:  LMA - 0.0  LCX - 16.2  LAD - 411.7  RCA - 277.1  PDA - 0.0    Calcium score:  705.1    Stress Echo Report  CONCLUSIONS  Negative stress echocardiogram for ischemia with adequate stress   achieved by heart rate criteria.   Excellent exercise tolerance, achieving 12.8 METS.    Exam Date:           07/03/2018     Assessment:     1. Elevated coronary artery calcium score: Approximately 720 18     2. Dyslipidemia         Medical Decision Making:  Today's Assessment / Status / Plan:     Ms. Meeks has a severely elevated LDL.  Her level was 196 when she was on pravastatin therapy.  She will have lab work in about 6 weeks and follow-up in a couple of months.  We may need to increase her rosuvastatin and/or add ezetimibe.  If we cannot get adequate control of her LDL, she may need a PC SK-9 inhibitor.   She is asymptomatic from a cardiovascular standpoint and had no evidence of ischemia on her stress echocardiogram at a high workload.

## 2018-08-09 ENCOUNTER — TELEPHONE (OUTPATIENT)
Dept: MEDICAL GROUP | Facility: LAB | Age: 58
End: 2018-08-09

## 2018-08-09 ENCOUNTER — TELEPHONE (OUTPATIENT)
Dept: CARDIOLOGY | Facility: MEDICAL CENTER | Age: 58
End: 2018-08-09

## 2018-08-09 NOTE — TELEPHONE ENCOUNTER
Patient would like to speak to you regarding a problem with her leg swelling and her heart condition. Would not elaborate with me and just wanted to get a call back on her cell phone.

## 2018-08-09 NOTE — TELEPHONE ENCOUNTER
Problem with feet swollen up to knees   Received: Today   Message Contents   Keya Calderon R.N.             FK/Zita     Patient is having problems with her feet being swollen up to the knees and when she pushes on them, they are hard as a rock. She wants a call back at [phone].      Returned patient call. No answer. LVM suggesting an appt with TT at [facility] tomorrow and to please call back to discuss.     returning your call   Received: Today   Message Contents   Luz Calderon R.N.   Phone Number: [phone]             Zita     Pt returning your call, please call her at work # [phone]      Returned patient call. Pt scheduled to see TT @ [facility] 8/10 at 11:15 for evaluation.

## 2018-08-10 ENCOUNTER — HOSPITAL ENCOUNTER (OUTPATIENT)
Dept: LAB | Facility: MEDICAL CENTER | Age: 58
End: 2018-08-10
Attending: PHYSICIAN ASSISTANT
Payer: COMMERCIAL

## 2018-08-10 ENCOUNTER — OFFICE VISIT (OUTPATIENT)
Dept: CARDIOLOGY | Facility: MEDICAL CENTER | Age: 58
End: 2018-08-10
Payer: COMMERCIAL

## 2018-08-10 VITALS
HEIGHT: 67 IN | BODY MASS INDEX: 21.19 KG/M2 | WEIGHT: 135 LBS | OXYGEN SATURATION: 95 % | DIASTOLIC BLOOD PRESSURE: 60 MMHG | SYSTOLIC BLOOD PRESSURE: 100 MMHG | HEART RATE: 82 BPM

## 2018-08-10 DIAGNOSIS — Z79.899 HIGH RISK MEDICATION USE: ICD-10-CM

## 2018-08-10 DIAGNOSIS — E78.5 DYSLIPIDEMIA: ICD-10-CM

## 2018-08-10 DIAGNOSIS — R93.1 ELEVATED CORONARY ARTERY CALCIUM SCORE: ICD-10-CM

## 2018-08-10 DIAGNOSIS — M79.89 LEG SWELLING: ICD-10-CM

## 2018-08-10 LAB
ALBUMIN SERPL BCP-MCNC: 4.9 G/DL (ref 3.2–4.9)
ALBUMIN/GLOB SERPL: 2.2 G/DL
ALP SERPL-CCNC: 80 U/L (ref 30–99)
ALT SERPL-CCNC: 21 U/L (ref 2–50)
ANION GAP SERPL CALC-SCNC: 8 MMOL/L (ref 0–11.9)
AST SERPL-CCNC: 24 U/L (ref 12–45)
BILIRUB SERPL-MCNC: 1 MG/DL (ref 0.1–1.5)
BUN SERPL-MCNC: 15 MG/DL (ref 8–22)
CALCIUM SERPL-MCNC: 9.3 MG/DL (ref 8.5–10.5)
CHLORIDE SERPL-SCNC: 105 MMOL/L (ref 96–112)
CHOLEST SERPL-MCNC: 179 MG/DL (ref 100–199)
CO2 SERPL-SCNC: 28 MMOL/L (ref 20–33)
CREAT SERPL-MCNC: 0.67 MG/DL (ref 0.5–1.4)
GLOBULIN SER CALC-MCNC: 2.2 G/DL (ref 1.9–3.5)
GLUCOSE SERPL-MCNC: 100 MG/DL (ref 65–99)
HDLC SERPL-MCNC: 75 MG/DL
LDLC SERPL CALC-MCNC: 88 MG/DL
POTASSIUM SERPL-SCNC: 4 MMOL/L (ref 3.6–5.5)
PROT SERPL-MCNC: 7.1 G/DL (ref 6–8.2)
SODIUM SERPL-SCNC: 141 MMOL/L (ref 135–145)
TRIGL SERPL-MCNC: 82 MG/DL (ref 0–149)

## 2018-08-10 PROCEDURE — 99214 OFFICE O/P EST MOD 30 MIN: CPT | Performed by: INTERNAL MEDICINE

## 2018-08-10 PROCEDURE — 36415 COLL VENOUS BLD VENIPUNCTURE: CPT

## 2018-08-10 PROCEDURE — 80053 COMPREHEN METABOLIC PANEL: CPT

## 2018-08-10 PROCEDURE — 80061 LIPID PANEL: CPT

## 2018-08-10 ASSESSMENT — ENCOUNTER SYMPTOMS
BLURRED VISION: 0
EYE PAIN: 0
VOMITING: 0
EYE DISCHARGE: 0
MYALGIAS: 0
PALPITATIONS: 0
CLAUDICATION: 0
SPEECH CHANGE: 0
NAUSEA: 0
SHORTNESS OF BREATH: 0
BLOOD IN STOOL: 0
LOSS OF CONSCIOUSNESS: 0
PND: 0
HEADACHES: 0
COUGH: 0
ABDOMINAL PAIN: 0
WEIGHT LOSS: 0
DIZZINESS: 0
BRUISES/BLEEDS EASILY: 0
FALLS: 0
ORTHOPNEA: 0
DOUBLE VISION: 0
DEPRESSION: 0
HALLUCINATIONS: 0
CHILLS: 0
SENSORY CHANGE: 0
FEVER: 0

## 2018-08-10 NOTE — LETTER
[facility]   [address]   [address]   [place] [address]  Phone: [phone]  Fax: [phone]              Evelyn Meeks  1960    Encounter Date: 8/10/2018    Cuco Crockett M.D.          PROGRESS NOTE:  Chief Complaint   Patient presents with   • Edema       Subjective:   Evelyn Meeks is a 57 y.o. female who presents today for cardiac care due to prior history of elevated coronary Ca score, Hyperlipidemia.    No with intermittent leg swelling.    Last visit, rosuvastatin 20 mg daily was started which brought her LDL from 198 down to 88.    Past Medical History:   Diagnosis Date   • Abnormal screening cardiac CT 6/20/2018   • Anal fissure 10/6/2011   • Back pain     left rib pain from fracture 1/2016   • Bronchitis 2015   • Chicken pox     as child   • Disorder of thyroid    • Dyslipidemia 8/19/2011   • Gastroesophageal reflux disease 9/7/2012   • Grief at loss of child 8/4/2015   • Hemorrhoid    • High cholesterol    • Kidney stone    • Measles     as child   • Migraine    • Mumps     as child   • No pertinent past medical history    • Osteoporosis 1/15/2016   • Pain 11/20/2017    left side rib   • Pneumonia 2015   • UTI (lower urinary tract infection) 2014     Past Surgical History:   Procedure Laterality Date   • CARPAL TUNNEL RELEASE Right 11/21/2017    Procedure: CARPAL TUNNEL RELEASE - REVISION;  Surgeon: Lizandro Weber M.D.;  Location: [facility];  Service: Orthopedics   • GUYONS TUNNEL RELEASE Right 11/21/2017    Procedure: GUYONS TUNNEL RELEASE;  Surgeon: Lizandro Weber M.D.;  Location: [facility];  Service: Orthopedics   • HARDWARE REMOVAL ORTHO Right 11/21/2017    Procedure: HARDWARE REMOVAL ORTHO - OIC DISTAL RADIUS PLATE;  Surgeon: Lizandro Weber M.D.;  Location: [facility];  Service: Orthopedics   • CARPAL TUNNEL RELEASE Right 1/20/2017    Procedure:  CARPAL TUNNEL RELEASE;  Surgeon: Jono Maynard M.D.;  Location: SURGERY [facility]S;  Service:    • PB INJECT NERV FARSHAD,ALEX,LUCITATPL  12/12/2016    Procedure: INJ-INTERCOSTAL MULTIPLE - T9, T10;  Surgeon: Rafat Nichols D.O.;  Location: SURGERY [facility];  Service: Pain Management   • WRIST ORIF Right 8/10/2016    Procedure: WRIST ORIF;  Surgeon: Jono Maynard M.D.;  Location: SURGERY [facility];  Service:    • ABDOMINAL HYSTERECTOMY TOTAL     • GYN SURGERY      c sections x 2   • GYN SURGERY      multiple D&C's   • HEMORRHOIDECTOMY     • TONSILLECTOMY       Family History   Problem Relation Age of Onset   • Diabetes Mother         complications of DM   • Heart Disease Father         65   • Heart Attack Father 64   • Cancer Son         appendenoma     Social History     Social History   • Marital status:      Spouse name: N/A   • Number of children: N/A   • Years of education: N/A     Occupational History   • Not on file.     Social History Main Topics   • Smoking status: Never Smoker   • Smokeless tobacco: Never Used   • Alcohol use No   • Drug use: No   • Sexual activity: Yes     Partners: Male     Other Topics Concern   • Not on file     Social History Narrative    ** Merged History Encounter **          Allergies   Allergen Reactions   • Other Food Anaphylaxis     Wine coffee   • Shellfish Allergy Anaphylaxis     RXN=whole life   • Lidocaine (Anorectal) [Topicaine] Rash and Itching      patch glue       • Pet [Cat Hair Extract] Hives     RXN=whole life   • Pollen Extract Itching     RXN=whole life   • Tape    • Erythromycin Vomiting     Outpatient Encounter Prescriptions as of 8/10/2018   Medication Sig Dispense Refill   • rosuvastatin (CRESTOR) 20 MG Tab Take 1 Tab by mouth every evening. 30 Tab 11   • loratadine (CLARITIN) 10 MG Tab TAKE ONE TABLET BY MOUTH DAILY 30 Tab 11   • nitrofurantoin monohydr macro (MACROBID) 100 MG Cap Take 1 Cap by mouth 2 times a day. 10 Cap 0      • levothyroxine (SYNTHROID) 50 MCG Tab TAKE ONE TABLET BY MOUTH EVERY MORNING ON AN EMPTY STOMACH 30 Tab 5   • gabapentin (NEURONTIN) 300 MG Cap Take 1 Cap by mouth 3 times a day. 90 Cap 11   • tramadol (ULTRAM) 50 MG Tab Take 1 Tab by mouth every four hours as needed for Moderate Pain. 90 Tab 3   • alendronate (FOSAMAX) 70 MG Tab Take 70 mg by mouth every 7 days. Thursdays     • ondansetron (ZOFRAN) 4 MG Tab tablet Take 1 Tab by mouth every 8 hours as needed (FOR NAUSEA OR VIMITING) for up to 6 doses. 6 Each 2   • sumatriptan (IMITREX) 50 MG Tab TAKE ONE TABLET BY MOUTH AT ONSET OF HEADACHE; MAY REPEAT ONE TABLET IN 2 HOURS AS NEEDED. MAX OF 2 DOSES A DAY 9 Tab 6   • Cholecalciferol (VITAMIN D-3) 1000 UNITS Cap Take 2,000 Units by mouth every day.     • ibuprofen (MOTRIN) 200 MG Tab Take 600 mg by mouth every four hours as needed for Mild Pain.       No facility-administered encounter medications on file as of 8/10/2018.      Review of Systems   Constitutional: Negative for chills, fever, malaise/fatigue and weight loss.   HENT: Negative for ear discharge, ear pain, hearing loss and nosebleeds.    Eyes: Negative for blurred vision, double vision, pain and discharge.   Respiratory: Negative for cough and shortness of breath.    Cardiovascular: Positive for leg swelling. Negative for chest pain, palpitations, orthopnea, claudication and PND.   Gastrointestinal: Negative for abdominal pain, blood in stool, melena, nausea and vomiting.   Genitourinary: Negative for dysuria and hematuria.   Musculoskeletal: Negative for falls, joint pain and myalgias.   Skin: Negative for itching and rash.   Neurological: Negative for dizziness, sensory change, speech change, loss of consciousness and headaches.   Endo/Heme/Allergies: Negative for environmental allergies. Does not bruise/bleed easily.   Psychiatric/Behavioral: Negative for depression, hallucinations and suicidal ideas.        Objective:   /60   Pulse 82   Ht  "1.702 m (5' 7\")   Wt 61.2 kg (135 lb)   LMP 08/28/2002   SpO2 95%   BMI 21.14 kg/m²      Physical Exam   Constitutional: She is oriented to person, place, and time. No distress.   HENT:   Head: Normocephalic and atraumatic.   Right Ear: External ear normal.   Left Ear: External ear normal.   Eyes: Right eye exhibits no discharge. Left eye exhibits no discharge.   Neck: No JVD present. No thyromegaly present.   Cardiovascular: Normal rate, regular rhythm, normal heart sounds and intact distal pulses.  Exam reveals no gallop and no friction rub.    No murmur heard.  Pulmonary/Chest: Breath sounds normal. No respiratory distress.   Abdominal: Bowel sounds are normal. She exhibits no distension. There is no tenderness.   Musculoskeletal: She exhibits no edema or tenderness.   Neurological: She is alert and oriented to person, place, and time. No cranial nerve deficit.   Skin: Skin is warm and dry. She is not diaphoretic.   Psychiatric: She has a normal mood and affect. Her behavior is normal.   Nursing note and vitals reviewed.      Assessment:     1. Elevated coronary artery calcium score: Approximately 720 18  ECHOCARDIOGRAM COMP W/O CONT   2. Dyslipidemia  ECHOCARDIOGRAM COMP W/O CONT   3. Leg swelling  ECHOCARDIOGRAM COMP W/O CONT   4. High risk medication use  ECHOCARDIOGRAM COMP W/O CONT       Medical Decision Making:  Today's Assessment / Status / Plan:   I will order transthoracic echocardiogram to assess for structural abnormalities.  Continue Rosuvastatin 20 mg po daily.      Segundo Crawford D.O.  [address] [address]  VIA In Basket                 "

## 2018-08-10 NOTE — PROGRESS NOTES
Chief Complaint   Patient presents with   • Edema       Subjective:   Evelyn Meeks is a 57 y.o. female who presents today for cardiac care due to prior history of elevated coronary Ca score, Hyperlipidemia.    No with intermittent leg swelling.    Last visit, rosuvastatin 20 mg daily was started which brought her LDL from 198 down to 88.    Past Medical History:   Diagnosis Date   • Abnormal screening cardiac CT 6/20/2018   • Anal fissure 10/6/2011   • Back pain     left rib pain from fracture 1/2016   • Bronchitis 2015   • Chicken pox     as child   • Disorder of thyroid    • Dyslipidemia 8/19/2011   • Gastroesophageal reflux disease 9/7/2012   • Grief at loss of child 8/4/2015   • Hemorrhoid    • High cholesterol    • Kidney stone    • Measles     as child   • Migraine    • Mumps     as child   • No pertinent past medical history    • Osteoporosis 1/15/2016   • Pain 11/20/2017    left side rib   • Pneumonia 2015   • UTI (lower urinary tract infection) 2014     Past Surgical History:   Procedure Laterality Date   • CARPAL TUNNEL RELEASE Right 11/21/2017    Procedure: CARPAL TUNNEL RELEASE - REVISION;  Surgeon: Lizandro Weber M.D.;  Location: [facility];  Service: Orthopedics   • GUYONS TUNNEL RELEASE Right 11/21/2017    Procedure: GUYONS TUNNEL RELEASE;  Surgeon: Lizandro Weber M.D.;  Location: [facility];  Service: Orthopedics   • HARDWARE REMOVAL ORTHO Right 11/21/2017    Procedure: HARDWARE REMOVAL ORTHO - OIC DISTAL RADIUS PLATE;  Surgeon: Lizandro Weber M.D.;  Location: [facility];  Service: Orthopedics   • CARPAL TUNNEL RELEASE Right 1/20/2017    Procedure: CARPAL TUNNEL RELEASE;  Surgeon: Jono Maynard M.D.;  Location: [facility];  Service:    • PB INJECT NERV BLCK,INTERCOST,MULTPL  12/12/2016    Procedure: INJ-INTERCOSTAL MULTIPLE - T9, T10;  Surgeon: Rafat Nichols D.O.;  Location: [facility]S;  Service: Pain Management    • WRIST ORIF Right 8/10/2016    Procedure: WRIST ORIF;  Surgeon: Jono Maynard M.D.;  Location: SURGERY [facility];  Service:    • ABDOMINAL HYSTERECTOMY TOTAL     • GYN SURGERY      c sections x 2   • GYN SURGERY      multiple D&C's   • HEMORRHOIDECTOMY     • TONSILLECTOMY       Family History   Problem Relation Age of Onset   • Diabetes Mother         complications of DM   • Heart Disease Father         65   • Heart Attack Father 64   • Cancer Son         appendenoma     Social History     Social History   • Marital status:      Spouse name: N/A   • Number of children: N/A   • Years of education: N/A     Occupational History   • Not on file.     Social History Main Topics   • Smoking status: Never Smoker   • Smokeless tobacco: Never Used   • Alcohol use No   • Drug use: No   • Sexual activity: Yes     Partners: Male     Other Topics Concern   • Not on file     Social History Narrative    ** Merged History Encounter **          Allergies   Allergen Reactions   • Other Food Anaphylaxis     Wine coffee   • Shellfish Allergy Anaphylaxis     RXN=whole life   • Lidocaine (Anorectal) [Topicaine] Rash and Itching      patch glue       • Pet [Cat Hair Extract] Hives     RXN=whole life   • Pollen Extract Itching     RXN=whole life   • Tape    • Erythromycin Vomiting     Outpatient Encounter Prescriptions as of 8/10/2018   Medication Sig Dispense Refill   • rosuvastatin (CRESTOR) 20 MG Tab Take 1 Tab by mouth every evening. 30 Tab 11   • loratadine (CLARITIN) 10 MG Tab TAKE ONE TABLET BY MOUTH DAILY 30 Tab 11   • nitrofurantoin monohydr macro (MACROBID) 100 MG Cap Take 1 Cap by mouth 2 times a day. 10 Cap 0   • levothyroxine (SYNTHROID) 50 MCG Tab TAKE ONE TABLET BY MOUTH EVERY MORNING ON AN EMPTY STOMACH 30 Tab 5   • gabapentin (NEURONTIN) 300 MG Cap Take 1 Cap by mouth 3 times a day. 90 Cap 11   • tramadol (ULTRAM) 50 MG Tab Take 1 Tab by mouth every four hours as needed for Moderate Pain. 90 Tab 3   •  "alendronate (FOSAMAX) 70 MG Tab Take 70 mg by mouth every 7 days. Thursdays     • ondansetron (ZOFRAN) 4 MG Tab tablet Take 1 Tab by mouth every 8 hours as needed (FOR NAUSEA OR VIMITING) for up to 6 doses. 6 Each 2   • sumatriptan (IMITREX) 50 MG Tab TAKE ONE TABLET BY MOUTH AT ONSET OF HEADACHE; MAY REPEAT ONE TABLET IN 2 HOURS AS NEEDED. MAX OF 2 DOSES A DAY 9 Tab 6   • Cholecalciferol (VITAMIN D-3) 1000 UNITS Cap Take 2,000 Units by mouth every day.     • ibuprofen (MOTRIN) 200 MG Tab Take 600 mg by mouth every four hours as needed for Mild Pain.       No facility-administered encounter medications on file as of 8/10/2018.      Review of Systems   Constitutional: Negative for chills, fever, malaise/fatigue and weight loss.   HENT: Negative for ear discharge, ear pain, hearing loss and nosebleeds.    Eyes: Negative for blurred vision, double vision, pain and discharge.   Respiratory: Negative for cough and shortness of breath.    Cardiovascular: Positive for leg swelling. Negative for chest pain, palpitations, orthopnea, claudication and PND.   Gastrointestinal: Negative for abdominal pain, blood in stool, melena, nausea and vomiting.   Genitourinary: Negative for dysuria and hematuria.   Musculoskeletal: Negative for falls, joint pain and myalgias.   Skin: Negative for itching and rash.   Neurological: Negative for dizziness, sensory change, speech change, loss of consciousness and headaches.   Endo/Heme/Allergies: Negative for environmental allergies. Does not bruise/bleed easily.   Psychiatric/Behavioral: Negative for depression, hallucinations and suicidal ideas.        Objective:   /60   Pulse 82   Ht 1.702 m (5' 7\")   Wt 61.2 kg (135 lb)   LMP 08/28/2002   SpO2 95%   BMI 21.14 kg/m²     Physical Exam   Constitutional: She is oriented to person, place, and time. No distress.   HENT:   Head: Normocephalic and atraumatic.   Right Ear: External ear normal.   Left Ear: External ear normal.   Eyes: " Right eye exhibits no discharge. Left eye exhibits no discharge.   Neck: No JVD present. No thyromegaly present.   Cardiovascular: Normal rate, regular rhythm, normal heart sounds and intact distal pulses.  Exam reveals no gallop and no friction rub.    No murmur heard.  Pulmonary/Chest: Breath sounds normal. No respiratory distress.   Abdominal: Bowel sounds are normal. She exhibits no distension. There is no tenderness.   Musculoskeletal: She exhibits no edema or tenderness.   Neurological: She is alert and oriented to person, place, and time. No cranial nerve deficit.   Skin: Skin is warm and dry. She is not diaphoretic.   Psychiatric: She has a normal mood and affect. Her behavior is normal.   Nursing note and vitals reviewed.      Assessment:     1. Elevated coronary artery calcium score: Approximately 720 18  ECHOCARDIOGRAM COMP W/O CONT   2. Dyslipidemia  ECHOCARDIOGRAM COMP W/O CONT   3. Leg swelling  ECHOCARDIOGRAM COMP W/O CONT   4. High risk medication use  ECHOCARDIOGRAM COMP W/O CONT       Medical Decision Making:  Today's Assessment / Status / Plan:   I will order transthoracic echocardiogram to assess for structural abnormalities.  Continue Rosuvastatin 20 mg po daily.

## 2018-08-10 NOTE — TELEPHONE ENCOUNTER
Telephone call returned answering machine only patient to return a phone call if I need to talk to her directly

## 2018-08-29 ENCOUNTER — HOSPITAL ENCOUNTER (OUTPATIENT)
Dept: CARDIOLOGY | Facility: MEDICAL CENTER | Age: 58
End: 2018-08-29
Attending: INTERNAL MEDICINE
Payer: COMMERCIAL

## 2018-08-29 DIAGNOSIS — M79.89 LEG SWELLING: ICD-10-CM

## 2018-08-29 DIAGNOSIS — R93.1 ELEVATED CORONARY ARTERY CALCIUM SCORE: ICD-10-CM

## 2018-08-29 DIAGNOSIS — E78.5 DYSLIPIDEMIA: ICD-10-CM

## 2018-08-29 DIAGNOSIS — Z79.899 HIGH RISK MEDICATION USE: ICD-10-CM

## 2018-08-29 LAB
LV EJECT FRACT  99904: 65
LV EJECT FRACT MOD 2C 99903: 66.02
LV EJECT FRACT MOD 4C 99902: 61.65
LV EJECT FRACT MOD BP 99901: 64.42

## 2018-08-29 PROCEDURE — 93306 TTE W/DOPPLER COMPLETE: CPT

## 2018-09-17 ENCOUNTER — OFFICE VISIT (OUTPATIENT)
Dept: MEDICAL GROUP | Facility: LAB | Age: 58
End: 2018-09-17
Payer: COMMERCIAL

## 2018-09-17 VITALS
OXYGEN SATURATION: 99 % | DIASTOLIC BLOOD PRESSURE: 70 MMHG | SYSTOLIC BLOOD PRESSURE: 118 MMHG | WEIGHT: 134 LBS | RESPIRATION RATE: 12 BRPM | HEART RATE: 62 BPM | BODY MASS INDEX: 21.03 KG/M2 | TEMPERATURE: 98 F | HEIGHT: 67 IN

## 2018-09-17 DIAGNOSIS — Z23 NEED FOR IMMUNIZATION AGAINST INFLUENZA: ICD-10-CM

## 2018-09-17 PROCEDURE — 99999 PR NO CHARGE: CPT | Mod: 25 | Performed by: INTERNAL MEDICINE

## 2018-09-17 PROCEDURE — 90686 IIV4 VACC NO PRSV 0.5 ML IM: CPT | Performed by: INTERNAL MEDICINE

## 2018-09-17 PROCEDURE — 90471 IMMUNIZATION ADMIN: CPT | Performed by: INTERNAL MEDICINE

## 2018-09-17 ASSESSMENT — PATIENT HEALTH QUESTIONNAIRE - PHQ9: CLINICAL INTERPRETATION OF PHQ2 SCORE: 0

## 2018-09-18 NOTE — PROGRESS NOTES
CC: Evelyn Meeks is a 58 y.o. female is suffering from   Chief Complaint   Patient presents with   • Follow-Up     3 months         SUBJECTIVE:  1. Need for immunization against influenza  Griselda Is here for follow-up, continues to do well, needs an influenza vaccination        Past social, family, history:   Social History   Substance Use Topics   • Smoking status: Never Smoker   • Smokeless tobacco: Never Used   • Alcohol use No         MEDICATIONS:    Current Outpatient Prescriptions:   •  rosuvastatin (CRESTOR) 20 MG Tab, Take 1 Tab by mouth every evening., Disp: 30 Tab, Rfl: 11  •  loratadine (CLARITIN) 10 MG Tab, TAKE ONE TABLET BY MOUTH DAILY, Disp: 30 Tab, Rfl: 11  •  levothyroxine (SYNTHROID) 50 MCG Tab, TAKE ONE TABLET BY MOUTH EVERY MORNING ON AN EMPTY STOMACH, Disp: 30 Tab, Rfl: 5  •  gabapentin (NEURONTIN) 300 MG Cap, Take 1 Cap by mouth 3 times a day., Disp: 90 Cap, Rfl: 11  •  Cholecalciferol (VITAMIN D-3) 1000 UNITS Cap, Take 2,000 Units by mouth every day., Disp: , Rfl:   •  nitrofurantoin monohydr macro (MACROBID) 100 MG Cap, Take 1 Cap by mouth 2 times a day., Disp: 10 Cap, Rfl: 0  •  tramadol (ULTRAM) 50 MG Tab, Take 1 Tab by mouth every four hours as needed for Moderate Pain., Disp: 90 Tab, Rfl: 3  •  alendronate (FOSAMAX) 70 MG Tab, Take 70 mg by mouth every 7 days. Thursdays, Disp: , Rfl:   •  ondansetron (ZOFRAN) 4 MG Tab tablet, Take 1 Tab by mouth every 8 hours as needed (FOR NAUSEA OR VIMITING) for up to 6 doses., Disp: 6 Each, Rfl: 2  •  sumatriptan (IMITREX) 50 MG Tab, TAKE ONE TABLET BY MOUTH AT ONSET OF HEADACHE; MAY REPEAT ONE TABLET IN 2 HOURS AS NEEDED. MAX OF 2 DOSES A DAY, Disp: 9 Tab, Rfl: 6  •  ibuprofen (MOTRIN) 200 MG Tab, Take 600 mg by mouth every four hours as needed for Mild Pain., Disp: , Rfl:     PROBLEMS:  Patient Active Problem List    Diagnosis Date Noted   • Elevated coronary artery calcium score: Approximately 720 18 07/06/2018   • Abnormal screening  "cardiac CT 06/20/2018   • Carpal tunnel syndrome on right 01/20/2017   • Thoracic back pain 12/12/2016   • Closed fracture of distal end of right radius 08/10/2016   • Other specified hypothyroidism 07/19/2016   • Osteoporosis 01/15/2016   • Grief at loss of child 08/04/2015   • ADD (attention deficit disorder) 03/26/2013   • Gastroesophageal reflux disease 09/07/2012   • Anal fissure 10/06/2011   • Dyslipidemia 08/19/2011       REVIEW OF SYSTEMS:  Gen.:  No Nausea, Vomiting, fever, Chills.  Heart: No chest pain.  Lungs:  No shortness of Breath.  Psychological: Geo unusual Anxiety depression     PHYSICAL EXAM   Constitutional: Alert, cooperative, not in acute distress.  Cardiovascular:  Rate Rhythm is regular without murmurs rubs clicks.     Thorax & Lungs: Clear to auscultation, no wheezing, rhonchi, or rales  HENT: Normocephalic, Atraumatic.  Eyes: PERRLA, EOMI, Conjunctiva normal.   Neck: Trachia is midline no swelling of the thyroid.   Neurologic: Alert & oriented x 3, cranial nerves II through XII are intact, Normal motor function, Normal sensory function, No focal deficits noted.   Psychiatric: Affect normal, Judgment normal, Mood normal.     VITAL SIGNS:/70   Pulse 62   Temp 36.7 °C (98 °F)   Resp 12   Ht 1.702 m (5' 7\")   Wt 60.8 kg (134 lb)   LMP 08/28/2002   SpO2 99%   BMI 20.99 kg/m²      Labs: Reviewed    Assessment:                                                     Plan:    1. Need for immunization against influenza  Vaccination given  - INFLUENZA VACCINE QUAD INJ >3Y(PF)        "

## 2018-10-10 RX ORDER — LEVOTHYROXINE SODIUM 0.05 MG/1
TABLET ORAL
Qty: 30 TAB | Refills: 4 | Status: SHIPPED | OUTPATIENT
Start: 2018-10-10 | End: 2018-12-17 | Stop reason: SDUPTHER

## 2018-11-09 ENCOUNTER — HOSPITAL ENCOUNTER (OUTPATIENT)
Dept: RADIOLOGY | Facility: MEDICAL CENTER | Age: 58
End: 2018-11-09
Attending: INTERNAL MEDICINE
Payer: COMMERCIAL

## 2018-11-09 DIAGNOSIS — Z12.31 BREAST CANCER SCREENING BY MAMMOGRAM: ICD-10-CM

## 2018-11-09 PROCEDURE — 77067 SCR MAMMO BI INCL CAD: CPT

## 2018-12-17 ENCOUNTER — OFFICE VISIT (OUTPATIENT)
Dept: MEDICAL GROUP | Facility: LAB | Age: 58
End: 2018-12-17
Payer: COMMERCIAL

## 2018-12-17 VITALS
DIASTOLIC BLOOD PRESSURE: 60 MMHG | OXYGEN SATURATION: 96 % | RESPIRATION RATE: 12 BRPM | HEIGHT: 67 IN | WEIGHT: 135 LBS | HEART RATE: 61 BPM | BODY MASS INDEX: 21.19 KG/M2 | SYSTOLIC BLOOD PRESSURE: 95 MMHG | TEMPERATURE: 98.2 F

## 2018-12-17 DIAGNOSIS — E78.5 DYSLIPIDEMIA: ICD-10-CM

## 2018-12-17 DIAGNOSIS — G43.001 MIGRAINE WITHOUT AURA AND WITH STATUS MIGRAINOSUS, NOT INTRACTABLE: ICD-10-CM

## 2018-12-17 DIAGNOSIS — F43.21 GRIEF AT LOSS OF CHILD: ICD-10-CM

## 2018-12-17 DIAGNOSIS — R93.1 ABNORMAL SCREENING CARDIAC CT: ICD-10-CM

## 2018-12-17 DIAGNOSIS — E03.2 HYPOTHYROIDISM DUE TO MEDICATION: ICD-10-CM

## 2018-12-17 DIAGNOSIS — J01.00 SUBACUTE MAXILLARY SINUSITIS: ICD-10-CM

## 2018-12-17 DIAGNOSIS — Z63.4 GRIEF AT LOSS OF CHILD: ICD-10-CM

## 2018-12-17 DIAGNOSIS — Z91.09 ENVIRONMENTAL ALLERGIES: ICD-10-CM

## 2018-12-17 PROCEDURE — 99214 OFFICE O/P EST MOD 30 MIN: CPT | Performed by: INTERNAL MEDICINE

## 2018-12-17 RX ORDER — ECHINACEA PURPUREA EXTRACT 125 MG
1 TABLET ORAL PRN
Qty: 1 BOTTLE | Refills: 3 | Status: SHIPPED
Start: 2018-12-17 | End: 2019-11-15

## 2018-12-17 RX ORDER — ROSUVASTATIN CALCIUM 20 MG/1
20 TABLET, COATED ORAL EVERY EVENING
Qty: 30 TAB | Refills: 11 | Status: SHIPPED | OUTPATIENT
Start: 2018-12-17 | End: 2019-02-11 | Stop reason: SDUPTHER

## 2018-12-17 RX ORDER — LORATADINE 10 MG/1
TABLET ORAL
Qty: 30 TAB | Refills: 11 | Status: SHIPPED | OUTPATIENT
Start: 2018-12-17 | End: 2019-10-29 | Stop reason: SDUPTHER

## 2018-12-17 RX ORDER — SUMATRIPTAN 50 MG/1
TABLET, FILM COATED ORAL
Qty: 9 TAB | Refills: 6 | Status: SHIPPED | OUTPATIENT
Start: 2018-12-17 | End: 2019-10-29 | Stop reason: SDUPTHER

## 2018-12-17 RX ORDER — LEVOTHYROXINE SODIUM 0.05 MG/1
TABLET ORAL
Qty: 30 TAB | Refills: 6 | Status: SHIPPED | OUTPATIENT
Start: 2018-12-17 | End: 2019-10-29 | Stop reason: SDUPTHER

## 2018-12-17 RX ORDER — AMOXICILLIN AND CLAVULANATE POTASSIUM 875; 125 MG/1; MG/1
1 TABLET, FILM COATED ORAL 2 TIMES DAILY
Qty: 20 TAB | Refills: 0 | Status: SHIPPED | OUTPATIENT
Start: 2018-12-17 | End: 2019-06-11

## 2018-12-17 SDOH — SOCIAL STABILITY - SOCIAL INSECURITY: DISSAPEARANCE AND DEATH OF FAMILY MEMBER: Z63.4

## 2018-12-17 NOTE — PROGRESS NOTES
CC: Evelyn Meeks is a 58 y.o. female is suffering from   Chief Complaint   Patient presents with   • Follow-Up     3 months   • Medication Refill         SUBJECTIVE:  1. Abnormal screening cardiac CT  Griselda is here for follow-up    2. Dyslipidemia  Patient continues to have positive CT cardiac scoring is to continue on medical therapy    3. Environmental allergies  Patient with a history of allergies, medications refilled    4. Migraine without aura and with status migrainosus, not intractable  Ongoing migraines medication refilled    5. Hypothyroidism due to medication  Hypothyroidism continue Synthroid    6. Subacute maxillary sinusitis  Sinusitis treated with Augmentin    7. Grief at loss of child  Patient is bearing her son who committed suicide in [place] in late May would like to have a follow-up appointment in early June        Past social, family, history:   Social History   Substance Use Topics   • Smoking status: Never Smoker   • Smokeless tobacco: Never Used   • Alcohol use No         MEDICATIONS:    Current Outpatient Prescriptions:   •  levothyroxine (SYNTHROID) 50 MCG Tab, TAKE ONE TABLET BY MOUTH EVERY MORNING ON AN EMPTY STOMACH, Disp: 30 Tab, Rfl: 6  •  rosuvastatin (CRESTOR) 20 MG Tab, Take 1 Tab by mouth every evening., Disp: 30 Tab, Rfl: 11  •  SUMAtriptan (IMITREX) 50 MG Tab, TAKE ONE TABLET BY MOUTH AT ONSET OF HEADACHE; MAY REPEAT ONE TABLET IN 2 HOURS AS NEEDED. MAX OF 2 DOSES A DAY, Disp: 9 Tab, Rfl: 6  •  loratadine (CLARITIN) 10 MG Tab, TAKE ONE TABLET BY MOUTH DAILY, Disp: 30 Tab, Rfl: 11  •  sodium chloride (AFRIN SALINE NASAL MIST) 0.65 % Solution, Spray 1 Spray in nose as needed for Congestion. Use only 2 to 3 days., Disp: 1 Bottle, Rfl: 3  •  amoxicillin-clavulanate (AUGMENTIN) 875-125 MG Tab, Take 1 Tab by mouth 2 times a day., Disp: 20 Tab, Rfl: 0  •  nitrofurantoin monohydr macro (MACROBID) 100 MG Cap, Take 1 Cap by mouth 2 times a day., Disp: 10 Cap, Rfl: 0  •   gabapentin (NEURONTIN) 300 MG Cap, Take 1 Cap by mouth 3 times a day., Disp: 90 Cap, Rfl: 11  •  tramadol (ULTRAM) 50 MG Tab, Take 1 Tab by mouth every four hours as needed for Moderate Pain., Disp: 90 Tab, Rfl: 3  •  alendronate (FOSAMAX) 70 MG Tab, Take 70 mg by mouth every 7 days. Thursdays, Disp: , Rfl:   •  ondansetron (ZOFRAN) 4 MG Tab tablet, Take 1 Tab by mouth every 8 hours as needed (FOR NAUSEA OR VIMITING) for up to 6 doses., Disp: 6 Each, Rfl: 2  •  Cholecalciferol (VITAMIN D-3) 1000 UNITS Cap, Take 2,000 Units by mouth every day., Disp: , Rfl:   •  ibuprofen (MOTRIN) 200 MG Tab, Take 600 mg by mouth every four hours as needed for Mild Pain., Disp: , Rfl:     PROBLEMS:  Patient Active Problem List    Diagnosis Date Noted   • Elevated coronary artery calcium score: Approximately 720 18 07/06/2018   • Abnormal screening cardiac CT 06/20/2018   • Carpal tunnel syndrome on right 01/20/2017   • Thoracic back pain 12/12/2016   • Closed fracture of distal end of right radius 08/10/2016   • Other specified hypothyroidism 07/19/2016   • Osteoporosis 01/15/2016   • Grief at loss of child 08/04/2015   • ADD (attention deficit disorder) 03/26/2013   • Gastroesophageal reflux disease 09/07/2012   • Anal fissure 10/06/2011   • Dyslipidemia 08/19/2011       REVIEW OF SYSTEMS:  Gen.:  No Nausea, Vomiting, fever, Chills.  Heart: No chest pain.  Lungs:  No shortness of Breath.  Psychological: Continued anxiety depression associate with the death of her son     PHYSICAL EXAM   Constitutional: Alert, cooperative, not in acute distress.  Cardiovascular:  Rate Rhythm is regular without murmurs rubs clicks.     Thorax & Lungs: Clear to auscultation, no wheezing, rhonchi, or rales  HENT: Normocephalic, Atraumatic.  Eyes: PERRLA, EOMI, Conjunctiva normal.   Neck: Trachia is midline no swelling of the thyroid.   Lymphatic: No lymphadenopathy noted.   Neurologic: Alert & oriented x 3, cranial nerves II through XII are intact,  "Normal motor function, Normal sensory function, No focal deficits noted.   Psychiatric: Affect normal, Judgment normal, Mood mildly depressed.    VITAL SIGNS:BP (!) 95/60   Pulse 61   Temp 36.8 °C (98.2 °F) (Temporal)   Resp 12   Ht 1.702 m (5' 7\")   Wt 61.2 kg (135 lb)   LMP 08/28/2002   SpO2 96%   BMI 21.14 kg/m²     Labs: Reviewed    Assessment:                                                     Plan:    1. Abnormal screening cardiac CT  Continue Synthroid  - levothyroxine (SYNTHROID) 50 MCG Tab; TAKE ONE TABLET BY MOUTH EVERY MORNING ON AN EMPTY STOMACH  Dispense: 30 Tab; Refill: 6    2. Dyslipidemia  No change in medical therapy    3. Environmental allergies  Continue Claritin  - loratadine (CLARITIN) 10 MG Tab; TAKE ONE TABLET BY MOUTH DAILY  Dispense: 30 Tab; Refill: 11    4. Migraine without aura and with status migrainosus, not intractable  Imitrex restarted  - SUMAtriptan (IMITREX) 50 MG Tab; TAKE ONE TABLET BY MOUTH AT ONSET OF HEADACHE; MAY REPEAT ONE TABLET IN 2 HOURS AS NEEDED. MAX OF 2 DOSES A DAY  Dispense: 9 Tab; Refill: 6    5. Hypothyroidism due to medication  Continue Claritin  - loratadine (CLARITIN) 10 MG Tab; TAKE ONE TABLET BY MOUTH DAILY  Dispense: 30 Tab; Refill: 11    6. Subacute maxillary sinusitis  Start Augmentin  - amoxicillin-clavulanate (AUGMENTIN) 875-125 MG Tab; Take 1 Tab by mouth 2 times a day.  Dispense: 20 Tab; Refill: 0    7. Grief at loss of child  We will have the patient follow-up early June patient will be bearing her son in [place] in late May        "

## 2019-02-04 ENCOUNTER — PATIENT MESSAGE (OUTPATIENT)
Dept: MEDICAL GROUP | Facility: LAB | Age: 59
End: 2019-02-04

## 2019-02-04 DIAGNOSIS — M62.830 BACK SPASM: ICD-10-CM

## 2019-02-04 RX ORDER — GABAPENTIN 300 MG/1
300 CAPSULE ORAL 3 TIMES DAILY
Qty: 90 CAP | Refills: 11 | Status: SHIPPED | OUTPATIENT
Start: 2019-02-04 | End: 2019-06-11

## 2019-02-04 RX ORDER — GABAPENTIN 300 MG/1
CAPSULE ORAL
Qty: 90 CAP | Refills: 10 | Status: SHIPPED | OUTPATIENT
Start: 2019-02-04 | End: 2019-06-11

## 2019-02-04 NOTE — TELEPHONE ENCOUNTER
Was the patient seen in the last year in this department? Yes lov 12/17/18    Does patient have an active prescription for medications requested? No     Received Request Via: Pharmacy

## 2019-02-05 ENCOUNTER — OCCUPATIONAL MEDICINE (OUTPATIENT)
Dept: URGENT CARE | Facility: CLINIC | Age: 59
End: 2019-02-05
Payer: COMMERCIAL

## 2019-02-05 VITALS
HEIGHT: 67 IN | HEART RATE: 70 BPM | OXYGEN SATURATION: 99 % | SYSTOLIC BLOOD PRESSURE: 124 MMHG | TEMPERATURE: 98.3 F | BODY MASS INDEX: 21.19 KG/M2 | WEIGHT: 135 LBS | RESPIRATION RATE: 16 BRPM | DIASTOLIC BLOOD PRESSURE: 78 MMHG

## 2019-02-05 DIAGNOSIS — M62.838 MUSCLE SPASM: ICD-10-CM

## 2019-02-05 DIAGNOSIS — S39.012A BACK STRAIN, INITIAL ENCOUNTER: ICD-10-CM

## 2019-02-05 LAB
AMP AMPHETAMINE: NORMAL
BAR BARBITURATES: NORMAL
BREATH ALCOHOL COMMENT: NORMAL
BZO BENZODIAZEPINES: NORMAL
COC COCAINE: NORMAL
INT CON NEG: NORMAL
INT CON POS: NORMAL
MDMA ECSTASY: NORMAL
MET METHAMPHETAMINES: NORMAL
MTD METHADONE: NORMAL
OPI OPIATES: NORMAL
OXY OXYCODONE: NORMAL
PCP PHENCYCLIDINE: NORMAL
POC BREATHALIZER: 0 PERCENT (ref 0–0.01)
POC URINE DRUG SCREEN OCDRS: NEGATIVE
THC: NORMAL

## 2019-02-05 PROCEDURE — 82075 ASSAY OF BREATH ETHANOL: CPT | Mod: 29 | Performed by: PHYSICIAN ASSISTANT

## 2019-02-05 PROCEDURE — 80305 DRUG TEST PRSMV DIR OPT OBS: CPT | Mod: 29 | Performed by: PHYSICIAN ASSISTANT

## 2019-02-05 PROCEDURE — 99214 OFFICE O/P EST MOD 30 MIN: CPT | Mod: 29 | Performed by: PHYSICIAN ASSISTANT

## 2019-02-05 ASSESSMENT — ENCOUNTER SYMPTOMS
PERIANAL NUMBNESS: 0
NUMBNESS: 0
SENSORY CHANGE: 0
BACK PAIN: 1
LEG PAIN: 0
FEVER: 0
FOCAL WEAKNESS: 0
FALLS: 0
CHILLS: 0

## 2019-02-05 NOTE — LETTER
"EMPLOYEE’S CLAIM FOR COMPENSATION/ REPORT OF INITIAL TREATMENT  FORM C-4    EMPLOYEE’S CLAIM - PROVIDE ALL INFORMATION REQUESTED   First Name  Evelyn Last Name  Constantino Birthdate                    1960                Sex  female Claim Number   Home Address  [address] Age  58 y.o. 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 (if applicable)  a box    How did this injury or occupational disease occur? (Be specific an answer in detail. Use additional sheet if necessary)  I was vacuum my office needed to move a box, bent down to move box when I felt a pinch like in the lower right side of back, box weighted less than 25 lbs.   If you believe that you have an occupational disease, when did you first have knowledge of the disability and it relationship to your employment?  n/a Witnesses to the Accident  Talisha Cook      Nature of Injury or Occupational Disease  Strain  Part(s) of Body Injured or Affected  Lower Back Area (Lumbar Area & Lumbo-Sacral), Defer, Defer    I certify that the above is " true and correct to the best of my knowledge and that I have provided this information in order to obtain the benefits of [place]  I hereby authorize any physician, chiropractor, surgeon, practitioner, or other person, any hospital, including [facility] or [facility], any medical service organization, any insurance company, or other institution or organization to release to each other, any medical or other information, including benefits paid or payable, pertinent to this injury or disease, except information relative to diagnosis, treatment and/or counseling for AIDS, psychological conditions, alcohol or controlled substances, for which I must give specific authorization.  A Photostat of this authorization shall be as valid as the original.     Date   Place   Employee’s Signature   THIS REPORT MUST BE COMPLETED AND MAILED WITHIN 3 WORKING DAYS OF TREATMENT   Place  [facility]  Name of [facility]   Date  2/5/2019 Diagnosis  (S39.012A) Back strain, initial encounter  (M62.838) Muscle spasm Is there evidence the injured employee was under the influence of alcohol and/or another controlled substance at the time of accident?   Hour  10:41 AM Description of Injury or Disease  Diagnoses of Back strain, initial encounter and Muscle spasm were pertinent to this visit. No   Treatment  Over-the-counter pain medications as discussed.  Continued use of gabapentin written by patient's primary care provider for back spasm.  Ice and heat rotation as discussed.  Have you advised the patient to remain off work five days or more? No   X-Ray Findings      If Yes   From Date  To Date      From information given by the employee, together with medical evidence, can you directly connect this injury or occupational disease as job incurred?  Yes If No Full Duty  Yes Modified Duty  No   Is  "additional medical care by a physician indicated?  Yes If Modified Duty, Specify any Limitations / Restrictions      Do you know of any previous injury or disease contributing to this condition or occupational disease?                            Yes  Comments:Prior degenerative disc cc of the T-spine.  Prior left rib injury   Date  2/5/2019 Print Doctor’s Name Nicolás Hines P.A.-C. I certify the employer’s copy of  this form was mailed on:   Address  [address] [place] [address] [address]     [facility] Zip  [address]    Provider’s Tax ID Number  [id number] Telephone  Dept: [phone]        e-NICOLÁS Simpson P.A.-C.   e-Signature: Dr. Noah Christian, Medical Director Degree  [employer]        ORIGINAL-TREATING PHYSICIAN OR CHIROPRACTOR    PAGE 2-INSURER/TPA    PAGE 3-EMPLOYER    PAGE 4-EMPLOYEE             Form C-4 (rev10/07)              BRIEF DESCRIPTION OF RIGHTS AND BENEFITS  (Pursuant to [place] 616C.050)    Notice of Injury or Occupational Disease (Incident Report Form C-1): If an injury or occupational disease (OD) arises out of and in the  course of employment, you must provide written notice to your employer as soon as practicable, but no later than 7 days after the accident or  OD. Your employer shall maintain a sufficient supply of the required forms.    Claim for Compensation (Form C-4): If medical treatment is sought, the form C-4 is available at the place of initial treatment. A completed  \"Claim for Compensation\" (Form C-4) must be filed within 90 days after an accident or OD. The treating physician or chiropractor must,  within 3 working days after treatment, complete and mail to the employer, the employer's insurer and third-party , the Claim for  Compensation.    Medical Treatment: If you require medical treatment for your on-the-job injury or OD, you may be required to select a physician or  chiropractor from a list provided by your workers’ compensation insurer, if it " has contracted with an Organization for Managed Care (MCO) or  Preferred Provider Organization (PPO) or providers of health care. If your employer has not entered into a contract with an MCO or PPO, you  may select a physician or chiropractor from the Panel of Physicians and Chiropractors. Any medical costs related to your industrial injury or  OD will be paid by your insurer.    Temporary Total Disability (TTD): If your doctor has certified that you are unable to work for a period of at least 5 consecutive days, or 5  cumulative days in a 20-day period, or places restrictions on you that your employer does not accommodate, you may be entitled to TTD  compensation.    Temporary Partial Disability (TPD): If the wage you receive upon reemployment is less than the compensation for TTD to which you are  entitled, the insurer may be required to pay you TPD compensation to make up the difference. TPD can only be paid for a maximum of 24  months.    Permanent Partial Disability (PPD): When your medical condition is stable and there is an indication of a PPD as a result of your injury or  OD, within 30 days, your insurer must arrange for an evaluation by a rating physician or chiropractor to determine the degree of your PPD. The  amount of your PPD award depends on the date of injury, the results of the PPD evaluation and your age and wage.    Permanent Total Disability (PTD): If you are medically certified by a treating physician or chiropractor as permanently and totally disabled  and have been granted a PTD status by your insurer, you are entitled to receive monthly benefits not to exceed 66 2/3% of your average  monthly wage. The amount of your PTD payments is subject to reduction if you previously received a PPD award.    Vocational Rehabilitation Services: You may be eligible for vocational rehabilitation services if you are unable to return to the job due to a  permanent physical impairment or permanent restrictions  as a result of your injury or occupational disease.    Transportation and Per Nay Reimbursement: You may be eligible for travel expenses and per nay associated with medical treatment.    Reopening: You may be able to reopen your claim if your condition worsens after claim closure.    Appeal Process: If you disagree with a written determination issued by the insurer or the insurer does not respond to your request, you may  appeal to the Department of Administration, , by following the instructions contained in your determination letter. You must  appeal the determination within 70 days from the date of the determination letter at [address], [address], [place], [place]  [address], or [address], [address], [place], [place] [address]. If you disagree with the  decision, you may appeal to the  Department of Administration, . You must file your appeal within 30 days from the date of the  decision  letter at [address], [address], [place], [place] [address], or [address], [address], [place], [place] [address]. If you  disagree with a decision of an , you may file a petition for judicial review with the District Court. You must do so within 30  days of the Appeal Officer’s decision. You may be represented by an  at your own expense or you may contact the [facility] for possible  representation.    [place]  for Injured Workers (NAIW): If you disagree with a  decision, you may request that NAIW represent you  without charge at an  Hearing. For information regarding denial of benefits, you may contact the [facility] at: [address], [address], [place] [address], [phone], or [address], [address] [address], [phone]    To File a Complaint with the Division: If you wish to file a complaint with the  of the Division of  Industrial Relations (DIR),  please contact the Workers’ Compensation Section, [address], [address], [place], [place] [address], telephone [phone], or  [address], [address], [place], [place] [address], telephone [phone].    For assistance with Workers’ Compensation Issues: you may contact the Office of the [facility] Consumer Health Assistance, [address] [place] [address], [address], [place], [place] [address], Toll Free 1-283.199.8525, Web site: http://govPrimekss.[facility].nv., E-mail  [email or web address].                                                                                                                                                                                                                                   __________________________________________________________________                                                                   _________________                Employee Name / Signature                                                                                                                                                       Date                                                                                                                                                                                                     D-2 (rev. 10/07)

## 2019-02-05 NOTE — PROGRESS NOTES
"Subjective:      Evelyn Meeks is a 58 y.o. female who presents with Work-Related Injury (New W/C DOI 2/1/2019, moving a box less than 25lb when she felt a pinch on (R) lower back. 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 25 lbs.        HPI    ROS       Objective:     /78   Pulse 70   Temp 36.8 °C (98.3 °F) (Temporal)   Resp 16   Ht 1.702 m (5' 7\")   Wt 61.2 kg (135 lb)   LMP 08/28/2002   SpO2 99%   BMI 21.14 kg/m²      Physical Exam            Assessment/Plan:     1. Back strain, initial encounter  ***    2. Muscle spasm  ***      "

## 2019-02-05 NOTE — LETTER
"   [facility]  [address] [facility] [address] [place] [address]  Phone:  [phone] - Fax:  [phone]   [facility] Progress Report and Disability Certification  Date of Service: 2/5/2019   No Show:  No  Date / Time of Next Visit: 2/13/2019@11:30am   Claim Information   Patient Name: Evelyn Meeks  Claim Number:     Employer: [employer]  Date of Injury: 2/1/2019     Insurer / TPA: [employer]  ID / SSN:     Occupation: Security   Diagnosis: Diagnoses of Back strain, initial encounter and Muscle spasm were pertinent to this visit.    Medical Information   Related to Industrial Injury? 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 25 lbs. She continued to vacuum her office and finished the day. The next morning she felt sore to the right side- however 2 days was the worse. She contacted her primary care provider as she has been on gabapentin in the past for back spasms (to the left side) of which he re-ordered her some. She started on gabapentin 300mg TID yesterday with significant improvement of symptoms .She denies any new ext. Tingling or weakness. She also denies any midline discomfort.  Denies any new arm or leg weakness as well.  Patient denies a second job.  Of note patient has prior history of left-sided rib trauma with notable left-sided thoracic back spasm however reports that her right side has always been \"fine \".  Patient admits that she has been conducting her job without limitations however will need to get up and move as sitting for too long will bother her back.     Objective Findings: Spine- without midline tenderness, step-off or deformity. Without scoliosis or kyphosis. Right sided mid-throacic spasm with tenderness to the right paraspinous aspect.  FROM with lateral bend, and " flexion/extension. Without noted tenderness over the sacroiliac notches. Sensation intact bilaterally, Right 4/5 .  Sensation intact.   Gait- WNL without foot drop.      Pre-Existing Condition(s): Prior DDD of the T spine. Left sided rib injury.    Assessment:   Initial Visit    Status: Additional Care Required  Permanent Disability:No    Plan:      Diagnostics:      Comments:       Disability Information   Status: Released to Full Duty    From:  2/5/2019  Through: 2/12/2019 Restrictions are:     Physical Restrictions   Sitting:    Standing:    Stooping:    Bending:      Squatting:    Walking:    Climbing:    Pushing:      Pulling:    Other:    Reaching Above Shoulder (L):   Reaching Above Shoulder (R):       Reaching Below Shoulder (L):    Reaching Below Shoulder (R):      Not to exceed Weight Limits   Carrying(hrs):   Weight Limit(lb):   Lifting(hrs):   Weight  Limit(lb):     Comments: Patient is to continue with over-the-counter medications also prescription that was written by her primary care provider for muscle spasm.  Ice and heat rotation as discussed.  Patient is to return to clinic on February 11 for recheck or sooner for any worsening symptoms.      Repetitive Actions   Hands: i.e. Fine Manipulations from Grasping:     Feet: i.e. Operating Foot Controls:     Driving / Operate Machinery:     Physician Name: Nicolás Hines P.A.-C. Physician Signature: NICOLÁS Tang P.A.-C. e-Signature: Dr. Noah Christian, Medical Director   Clinic Name / Location: [address] [address] Clinic Phone Number: Dept: [phone]   Appointment Time: 10:15 Am Visit Start Time: 10:41 AM   Check-In Time:  10:24 Am Visit Discharge Time: 11:09 Am    Original-Treating Physician or Chiropractor    Page 2-Insurer/TPA    Page 3-Employer    Page 4-Employee

## 2019-02-05 NOTE — PROGRESS NOTES
"Subjective:      Evelyn Meeks is a 58 y.o. female who presents with Work-Related Injury (New W/C DOI 2/1/2019, moving a box less than 25lb when she felt a pinch on (R) lower back. 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 25 lbs. She continued to vacuum her office and finished the day. The next morning she felt sore to the right side- however 2 days was the worse. She contacted her primary care provider as she has been on gabapentin in the past for back spasms (to the left side) of which he re-ordered her some. She started on gabapentin 300mg TID yesterday with significant improvement of symptoms .She denies any new ext. Tingling or weakness. She also denies any midline discomfort.  Denies any new arm or leg weakness as well.  Patient denies a second job.  Of note patient has prior history of left-sided rib trauma with notable left-sided thoracic back spasm however reports that her right side has always been \"fine \".  Patient admits that she has been conducting her job without limitations however will need to get up and move as sitting for too long will bother her back.       Back Pain   This is a new problem. The current episode started in the past 7 days. The problem occurs constantly. The problem has been waxing and waning since onset. The pain is present in the thoracic spine. The quality of the pain is described as shooting. The pain is moderate. The symptoms are aggravated by position and sitting. Pertinent negatives include no dysuria, fever, leg pain, numbness or perianal numbness. Treatments tried: As above.        Review of Systems   Constitutional: Negative for chills and fever.   Genitourinary: Negative for dysuria and urgency.   Musculoskeletal: Positive for back pain. Negative for falls and joint pain. " "  Neurological: Negative for sensory change, focal weakness and numbness.   All other systems reviewed and are negative.         Objective:     /78   Pulse 70   Temp 36.8 °C (98.3 °F) (Temporal)   Resp 16   Ht 1.702 m (5' 7\")   Wt 61.2 kg (135 lb)   LMP 08/28/2002   SpO2 99%   BMI 21.14 kg/m²    PMH:  has a past medical history of Abnormal screening cardiac CT (6/20/2018); Anal fissure (10/6/2011); Back pain; Bronchitis (2015); Chicken pox; Disorder of thyroid; Dyslipidemia (8/19/2011); Gastroesophageal reflux disease (9/7/2012); Grief at loss of child (8/4/2015); Hemorrhoid; High cholesterol; Kidney stone; Measles; Migraine; Mumps; No pertinent past medical history; Osteoporosis (1/15/2016); Pain (11/20/2017); Pneumonia (2015); and UTI (lower urinary tract infection) (2014).  MEDS:   Current Outpatient Prescriptions:   •  gabapentin (NEURONTIN) 300 MG Cap, TAKE ONE CAPSULE BY MOUTH THREE TIMES A DAY, Disp: 90 Cap, Rfl: 10  •  gabapentin (NEURONTIN) 300 MG Cap, Take 1 Cap by mouth 3 times a day., Disp: 90 Cap, Rfl: 11  •  levothyroxine (SYNTHROID) 50 MCG Tab, TAKE ONE TABLET BY MOUTH EVERY MORNING ON AN EMPTY STOMACH, Disp: 30 Tab, Rfl: 6  •  rosuvastatin (CRESTOR) 20 MG Tab, Take 1 Tab by mouth every evening., Disp: 30 Tab, Rfl: 11  •  loratadine (CLARITIN) 10 MG Tab, TAKE ONE TABLET BY MOUTH DAILY, Disp: 30 Tab, Rfl: 11  •  alendronate (FOSAMAX) 70 MG Tab, Take 70 mg by mouth every 7 days. Thursdays, Disp: , Rfl:   •  Cholecalciferol (VITAMIN D-3) 1000 UNITS Cap, Take 2,000 Units by mouth every day., Disp: , Rfl:   •  ibuprofen (MOTRIN) 200 MG Tab, Take 600 mg by mouth every four hours as needed for Mild Pain., Disp: , Rfl:   •  SUMAtriptan (IMITREX) 50 MG Tab, TAKE ONE TABLET BY MOUTH AT ONSET OF HEADACHE; MAY REPEAT ONE TABLET IN 2 HOURS AS NEEDED. MAX OF 2 DOSES A DAY, Disp: 9 Tab, Rfl: 6  •  sodium chloride (AFRIN SALINE NASAL MIST) 0.65 % Solution, Spray 1 Spray in nose as needed for Congestion. " Use only 2 to 3 days., Disp: 1 Bottle, Rfl: 3  •  amoxicillin-clavulanate (AUGMENTIN) 875-125 MG Tab, Take 1 Tab by mouth 2 times a day., Disp: 20 Tab, Rfl: 0  •  nitrofurantoin monohydr macro (MACROBID) 100 MG Cap, Take 1 Cap by mouth 2 times a day., Disp: 10 Cap, Rfl: 0  •  tramadol (ULTRAM) 50 MG Tab, Take 1 Tab by mouth every four hours as needed for Moderate Pain., Disp: 90 Tab, Rfl: 3  •  ondansetron (ZOFRAN) 4 MG Tab tablet, Take 1 Tab by mouth every 8 hours as needed (FOR NAUSEA OR VIMITING) for up to 6 doses., Disp: 6 Each, Rfl: 2  ALLERGIES:   Allergies   Allergen Reactions   • Other Food Anaphylaxis     Wine coffee   • Shellfish Allergy Anaphylaxis     RXN=whole life   • Lidocaine (Anorectal) [Topicaine] Rash and Itching      patch glue       • Pet [Cat Hair Extract] Hives     RXN=whole life   • Pollen Extract Itching     RXN=whole life   • Tape    • Erythromycin Vomiting     SURGHX:   Past Surgical History:   Procedure Laterality Date   • CARPAL TUNNEL RELEASE Right 11/21/2017    Procedure: CARPAL TUNNEL RELEASE - REVISION;  Surgeon: Lizandro Weber M.D.;  Location: [facility];  Service: Orthopedics   • GUYONS TUNNEL RELEASE Right 11/21/2017    Procedure: GUYONS TUNNEL RELEASE;  Surgeon: Lizandro Weber M.D.;  Location: [facility];  Service: Orthopedics   • HARDWARE REMOVAL ORTHO Right 11/21/2017    Procedure: HARDWARE REMOVAL ORTHO - OIC DISTAL RADIUS PLATE;  Surgeon: Lizandro Weber M.D.;  Location: [facility];  Service: Orthopedics   • CARPAL TUNNEL RELEASE Right 1/20/2017    Procedure: CARPAL TUNNEL RELEASE;  Surgeon: Jono Maynard M.D.;  Location[place] [facility];  Service:    • PB INJECT NERV BLCK,INTERCOST,MULTPL  12/12/2016    Procedure: INJ-INTERCOSTAL MULTIPLE - T9, T10;  Surgeon: Rafat Nichols D.O.;  Location: [facility];  Service: Pain Management   • WRIST ORIF Right 8/10/2016    Procedure: WRIST ORIF;  Surgeon: Jono AKERS  MADHURI Maynard;  Location: SURGERY [facility];  Service:    • ABDOMINAL HYSTERECTOMY TOTAL     • GYN SURGERY      c sections x 2   • GYN SURGERY      multiple D&C's   • HEMORRHOIDECTOMY     • TONSILLECTOMY       SOCHX:  reports that she has never smoked. She has never used smokeless tobacco. She reports that she does not drink alcohol or use drugs.  FH: Family history was reviewed, no pertinent findings to report    Physical Exam   Constitutional: She is oriented to person, place, and time. She appears well-developed and well-nourished. No distress.   HENT:   Head: Normocephalic and atraumatic.   Eyes: Pupils are equal, round, and reactive to light. Conjunctivae and EOM are normal.   Neck: Normal range of motion. Neck supple. No tracheal deviation present.   Cardiovascular: Normal rate.    Pulmonary/Chest: Effort normal.   Neurological: She is alert and oriented to person, place, and time.   Skin: Skin is warm. No rash noted.   Psychiatric: She has a normal mood and affect. Her behavior is normal. Judgment and thought content normal.   Vitals reviewed.      Spine- without midline tenderness, step-off or deformity. Without scoliosis or kyphosis. Right sided mid-throacic spasm with tenderness to the right paraspinous aspect.  FROM with lateral bend, and flexion/extension. Without noted tenderness over the sacroiliac notches. Sensation intact bilaterally, Right 4/5 .  Sensation intact.   Gait- WNL without foot drop.          Assessment/Plan:     1. Back strain, initial encounter  2. Muscle spasm    Please see D39 for further information .  Patient currently at a desk job of which encouraged the patient to sit and stand as tolerated.  Patient does not left more than usually 15 pounds at her job of which patient was currently released back to full duty.  Over-the-counter medication was encouraged with the utilization of ice and heat rotation.  Patient also was written gabapentin by her primary care provider for  spasm of which the patient has been successful with.  Patient is to return to clinic on February 11 or sooner for any worsening symptoms.

## 2019-02-11 ENCOUNTER — OFFICE VISIT (OUTPATIENT)
Dept: CARDIOLOGY | Facility: MEDICAL CENTER | Age: 59
End: 2019-02-11
Payer: COMMERCIAL

## 2019-02-11 VITALS
HEART RATE: 70 BPM | OXYGEN SATURATION: 94 % | WEIGHT: 141 LBS | DIASTOLIC BLOOD PRESSURE: 62 MMHG | HEIGHT: 67 IN | BODY MASS INDEX: 22.13 KG/M2 | SYSTOLIC BLOOD PRESSURE: 100 MMHG

## 2019-02-11 DIAGNOSIS — M79.89 LEG SWELLING: ICD-10-CM

## 2019-02-11 DIAGNOSIS — R93.1 ELEVATED CORONARY ARTERY CALCIUM SCORE: ICD-10-CM

## 2019-02-11 DIAGNOSIS — R93.1 ABNORMAL SCREENING CARDIAC CT: ICD-10-CM

## 2019-02-11 DIAGNOSIS — Z79.899 HIGH RISK MEDICATION USE: ICD-10-CM

## 2019-02-11 DIAGNOSIS — E78.5 DYSLIPIDEMIA: ICD-10-CM

## 2019-02-11 PROCEDURE — 99214 OFFICE O/P EST MOD 30 MIN: CPT | Performed by: INTERNAL MEDICINE

## 2019-02-11 RX ORDER — ROSUVASTATIN CALCIUM 20 MG/1
20 TABLET, COATED ORAL EVERY EVENING
Qty: 90 TAB | Refills: 3 | Status: SHIPPED | OUTPATIENT
Start: 2019-02-11 | End: 2019-10-29 | Stop reason: SDUPTHER

## 2019-02-11 ASSESSMENT — ENCOUNTER SYMPTOMS
HEADACHES: 0
SHORTNESS OF BREATH: 0
DOUBLE VISION: 0
BLURRED VISION: 0
EYE DISCHARGE: 0
LOSS OF CONSCIOUSNESS: 0
CHILLS: 0
NAUSEA: 0
CLAUDICATION: 0
SENSORY CHANGE: 0
ABDOMINAL PAIN: 0
MYALGIAS: 0
SPEECH CHANGE: 0
EYE PAIN: 0
HALLUCINATIONS: 0
COUGH: 0
FEVER: 0
WEIGHT LOSS: 0
DIZZINESS: 0
FALLS: 0
ORTHOPNEA: 0
BLOOD IN STOOL: 0
VOMITING: 0
PALPITATIONS: 0
DEPRESSION: 0
PND: 0
BRUISES/BLEEDS EASILY: 0

## 2019-02-11 NOTE — LETTER
[facility]   [address]  [place] [address]  Phone: [phone]  Fax: [phone]              Evelyn Meeks  1960    Encounter Date: 2/11/2019    Cuco Crockett M.D.          PROGRESS NOTE:  Chief Complaint   Patient presents with   • Hyperlipidemia     F/V: 6 MO       Subjective:   Evelyn Meeks is a 58 y.o. female who presents today for cardiac care due to prior history of elevated coronary Ca score, Hyperlipidemia.     In the interim, patient has been doing well without having any symptoms. Patient denies having chest pain, dyspnea, palpitation, presyncope, syncope episodes. Able to climb up at least 2 flights of stairs.     Last visit, rosuvastatin 20 mg daily was started which brought her LDL from 198 down to 88.    Past Medical History:   Diagnosis Date   • Abnormal screening cardiac CT 6/20/2018   • Anal fissure 10/6/2011   • Back pain     left rib pain from fracture 1/2016   • Bronchitis 2015   • Chicken pox     as child   • Disorder of thyroid    • Dyslipidemia 8/19/2011   • Gastroesophageal reflux disease 9/7/2012   • Grief at loss of child 8/4/2015   • Hemorrhoid    • High cholesterol    • Kidney stone    • Measles     as child   • Migraine    • Mumps     as child   • No pertinent past medical history    • Osteoporosis 1/15/2016   • Pain 11/20/2017    left side rib   • Pneumonia 2015   • UTI (lower urinary tract infection) 2014     Past Surgical History:   Procedure Laterality Date   • CARPAL TUNNEL RELEASE Right 11/21/2017    Procedure: CARPAL TUNNEL RELEASE - REVISION;  Surgeon: Lizandro Weber M.D.;  Location: [facility];  Service: Orthopedics   • GUYONS TUNNEL RELEASE Right 11/21/2017    Procedure: GUYONS TUNNEL RELEASE;  Surgeon: Lizandro Weber M.D.;  Location: [facility];  Service: Orthopedics   • HARDWARE REMOVAL ORTHO Right 11/21/2017    Procedure: HARDWARE REMOVAL ORTHO - OIC  DISTAL RADIUS PLATE;  Surgeon: Lizandro Weber M.D.;  Location: SURGERY [facility];  Service: Orthopedics   • CARPAL TUNNEL RELEASE Right 1/20/2017    Procedure: CARPAL TUNNEL RELEASE;  Surgeon: Jono Maynard M.D.;  Location: SURGERY [facility];  Service:    • PB INJECT NERV BLCK,INTERCOST,MULTPL  12/12/2016    Procedure: INJ-INTERCOSTAL MULTIPLE - T9, T10;  Surgeon: Rafat Nichols D.O.;  Location: SURGERY [facility];  Service: Pain Management   • WRIST ORIF Right 8/10/2016    Procedure: WRIST ORIF;  Surgeon: Jono Maynard M.D.;  Location: SURGERY [facility];  Service:    • ABDOMINAL HYSTERECTOMY TOTAL     • GYN SURGERY      c sections x 2   • GYN SURGERY      multiple D&C's   • HEMORRHOIDECTOMY     • TONSILLECTOMY       Family History   Problem Relation Age of Onset   • Diabetes Mother         complications of DM   • Heart Disease Father         65   • Heart Attack Father 64   • Cancer Son         appendenoma     Social History     Social History   • Marital status:      Spouse name: N/A   • Number of children: N/A   • Years of education: N/A     Occupational History   • Not on file.     Social History Main Topics   • Smoking status: Never Smoker   • Smokeless tobacco: Never Used   • Alcohol use No   • Drug use: No   • Sexual activity: Yes     Partners: Male     Other Topics Concern   • Not on file     Social History Narrative    ** Merged History Encounter **          Allergies   Allergen Reactions   • Other Food Anaphylaxis     Wine coffee   • Shellfish Allergy Anaphylaxis     RXN=whole life   • Lidocaine (Anorectal) [Topicaine] Rash and Itching      patch glue       • Pet [Cat Hair Extract] Hives     RXN=whole life   • Pollen Extract Itching     RXN=whole life   • Tape    • Erythromycin Vomiting     Outpatient Encounter Prescriptions as of 2/11/2019   Medication Sig Dispense Refill   • gabapentin (NEURONTIN) 300 MG Cap TAKE ONE CAPSULE BY MOUTH THREE TIMES A DAY 90 Cap 10     • levothyroxine (SYNTHROID) 50 MCG Tab TAKE ONE TABLET BY MOUTH EVERY MORNING ON AN EMPTY STOMACH 30 Tab 6   • rosuvastatin (CRESTOR) 20 MG Tab Take 1 Tab by mouth every evening. 30 Tab 11   • loratadine (CLARITIN) 10 MG Tab TAKE ONE TABLET BY MOUTH DAILY 30 Tab 11   • sodium chloride (AFRIN SALINE NASAL MIST) 0.65 % Solution Spray 1 Spray in nose as needed for Congestion. Use only 2 to 3 days. 1 Bottle 3   • alendronate (FOSAMAX) 70 MG Tab Take 70 mg by mouth every 7 days. Thursdays     • Cholecalciferol (VITAMIN D-3) 1000 UNITS Cap Take 2,000 Units by mouth every day.     • gabapentin (NEURONTIN) 300 MG Cap Take 1 Cap by mouth 3 times a day. (Patient not taking: Reported on 2/11/2019) 90 Cap 11   • SUMAtriptan (IMITREX) 50 MG Tab TAKE ONE TABLET BY MOUTH AT ONSET OF HEADACHE; MAY REPEAT ONE TABLET IN 2 HOURS AS NEEDED. MAX OF 2 DOSES A DAY 9 Tab 6   • amoxicillin-clavulanate (AUGMENTIN) 875-125 MG Tab Take 1 Tab by mouth 2 times a day. (Patient not taking: Reported on 2/11/2019) 20 Tab 0   • nitrofurantoin monohydr macro (MACROBID) 100 MG Cap Take 1 Cap by mouth 2 times a day. (Patient not taking: Reported on 2/11/2019) 10 Cap 0   • tramadol (ULTRAM) 50 MG Tab Take 1 Tab by mouth every four hours as needed for Moderate Pain. 90 Tab 3   • ondansetron (ZOFRAN) 4 MG Tab tablet Take 1 Tab by mouth every 8 hours as needed (FOR NAUSEA OR VIMITING) for up to 6 doses. 6 Each 2   • ibuprofen (MOTRIN) 200 MG Tab Take 600 mg by mouth every four hours as needed for Mild Pain.       No facility-administered encounter medications on file as of 2/11/2019.      Review of Systems   Constitutional: Negative for chills, fever, malaise/fatigue and weight loss.   HENT: Negative for ear discharge, ear pain, hearing loss and nosebleeds.    Eyes: Negative for blurred vision, double vision, pain and discharge.   Respiratory: Negative for cough and shortness of breath.    Cardiovascular: Negative for chest pain, palpitations, orthopnea,  "claudication, leg swelling and PND.   Gastrointestinal: Negative for abdominal pain, blood in stool, melena, nausea and vomiting.   Genitourinary: Negative for dysuria and hematuria.   Musculoskeletal: Negative for falls, joint pain and myalgias.   Skin: Negative for itching and rash.   Neurological: Negative for dizziness, sensory change, speech change, loss of consciousness and headaches.   Endo/Heme/Allergies: Negative for environmental allergies. Does not bruise/bleed easily.   Psychiatric/Behavioral: Negative for depression, hallucinations and suicidal ideas.        Objective:   /62 (BP Location: Right arm, Patient Position: Sitting, BP Cuff Size: Adult)   Pulse 70   Ht 1.702 m (5' 7\")   Wt 64 kg (141 lb)   LMP 08/28/2002   SpO2 94%   BMI 22.08 kg/m²      Physical Exam   Constitutional: She is oriented to person, place, and time. No distress.   HENT:   Head: Normocephalic and atraumatic.   Right Ear: External ear normal.   Left Ear: External ear normal.   Eyes: Right eye exhibits no discharge. Left eye exhibits no discharge.   Neck: No JVD present. No thyromegaly present.   Cardiovascular: Normal rate, regular rhythm, normal heart sounds and intact distal pulses.  Exam reveals no gallop and no friction rub.    No murmur heard.  Pulmonary/Chest: Breath sounds normal. No respiratory distress.   Abdominal: Bowel sounds are normal. She exhibits no distension. There is no tenderness.   Musculoskeletal: She exhibits no edema or tenderness.   Neurological: She is alert and oriented to person, place, and time. No cranial nerve deficit.   Skin: Skin is warm and dry. She is not diaphoretic.   Psychiatric: She has a normal mood and affect. Her behavior is normal.   Nursing note and vitals reviewed.      Assessment:     1. Elevated coronary artery calcium score: Approximately 720 18  Comp Metabolic Panel    LIPID PANEL   2. Dyslipidemia  Comp Metabolic Panel    LIPID PANEL   3. Leg swelling  Comp Metabolic " Panel    LIPID PANEL   4. Abnormal screening cardiac CT  Comp Metabolic Panel    LIPID PANEL   5. High risk medication use  Comp Metabolic Panel    LIPID PANEL       Medical Decision Making:  Today's Assessment / Status / Plan:   Today, based on physical examination findings, patient is euvolemic. No JVD, lungs are clear to auscultation, no pitting edema in bilateral lower extremities, no ascites.    Dry weight is 141 lbs.    Cont current medications at current dose.     I will see patient back in clinic with lab tests and studies results in 12 months.    I thank you Dr. Crawford for referring patient to our Cardiology Clinic today.            Segundo Crawford, BUD.  [id number] [address]  VIA In Basket

## 2019-02-11 NOTE — PROGRESS NOTES
Chief Complaint   Patient presents with   • Hyperlipidemia     F/V: 6 MO       Subjective:   Evelyn Meeks is a 58 y.o. female who presents today for cardiac care due to prior history of elevated coronary Ca score, Hyperlipidemia.     In the interim, patient has been doing well without having any symptoms. Patient denies having chest pain, dyspnea, palpitation, presyncope, syncope episodes. Able to climb up at least 2 flights of stairs.     Last visit, rosuvastatin 20 mg daily was started which brought her LDL from 198 down to 88.    Past Medical History:   Diagnosis Date   • Abnormal screening cardiac CT 6/20/2018   • Anal fissure 10/6/2011   • Back pain     left rib pain from fracture 1/2016   • Bronchitis 2015   • Chicken pox     as child   • Disorder of thyroid    • Dyslipidemia 8/19/2011   • Gastroesophageal reflux disease 9/7/2012   • Grief at loss of child 8/4/2015   • Hemorrhoid    • High cholesterol    • Kidney stone    • Measles     as child   • Migraine    • Mumps     as child   • No pertinent past medical history    • Osteoporosis 1/15/2016   • Pain 11/20/2017    left side rib   • Pneumonia 2015   • UTI (lower urinary tract infection) 2014     Past Surgical History:   Procedure Laterality Date   • CARPAL TUNNEL RELEASE Right 11/21/2017    Procedure: CARPAL TUNNEL RELEASE - REVISION;  Surgeon: Lizandro Weber M.D.;  Location: [facility];  Service: Orthopedics   • GUYONS TUNNEL RELEASE Right 11/21/2017    Procedure: GUYONS TUNNEL RELEASE;  Surgeon: Lizandro Weber M.D.;  Location: [facility];  Service: Orthopedics   • HARDWARE REMOVAL ORTHO Right 11/21/2017    Procedure: HARDWARE REMOVAL ORTHO - OIC DISTAL RADIUS PLATE;  Surgeon: Lizandro Weber M.D.;  Location: [facility];  Service: Orthopedics   • CARPAL TUNNEL RELEASE Right 1/20/2017    Procedure: CARPAL TUNNEL RELEASE;  Surgeon: Jono Maynard M.D.;  Location: [facility];  Service:     • PB INJECT NERV ALEX YEN,LUCITATPL  12/12/2016    Procedure: INJ-INTERCOSTAL MULTIPLE - T9, T10;  Surgeon: Rafat Nichols D.O.;  [facility]: [facility];  Service: Pain Management   • WRIST ORIF Right 8/10/2016    Procedure: WRIST ORIF;  Surgeon: Jono Maynard M.D.;  Location: SURGERY [facility];  Service:    • ABDOMINAL HYSTERECTOMY TOTAL     • GYN SURGERY      c sections x 2   • GYN SURGERY      multiple D&C's   • HEMORRHOIDECTOMY     • TONSILLECTOMY       Family History   Problem Relation Age of Onset   • Diabetes Mother         complications of DM   • Heart Disease Father         65   • Heart Attack Father 64   • Cancer Son         appendenoma     Social History     Social History   • Marital status:      Spouse name: N/A   • Number of children: N/A   • Years of education: N/A     Occupational History   • Not on file.     Social History Main Topics   • Smoking status: Never Smoker   • Smokeless tobacco: Never Used   • Alcohol use No   • Drug use: No   • Sexual activity: Yes     Partners: Male     Other Topics Concern   • Not on file     Social History Narrative    ** Merged History Encounter **          Allergies   Allergen Reactions   • Other Food Anaphylaxis     Wine coffee   • Shellfish Allergy Anaphylaxis     RXN=whole life   • Lidocaine (Anorectal) [Topicaine] Rash and Itching      patch glue       • Pet [Cat Hair Extract] Hives     RXN=whole life   • Pollen Extract Itching     RXN=whole life   • Tape    • Erythromycin Vomiting     Outpatient Encounter Prescriptions as of 2/11/2019   Medication Sig Dispense Refill   • gabapentin (NEURONTIN) 300 MG Cap TAKE ONE CAPSULE BY MOUTH THREE TIMES A DAY 90 Cap 10   • levothyroxine (SYNTHROID) 50 MCG Tab TAKE ONE TABLET BY MOUTH EVERY MORNING ON AN EMPTY STOMACH 30 Tab 6   • rosuvastatin (CRESTOR) 20 MG Tab Take 1 Tab by mouth every evening. 30 Tab 11   • loratadine (CLARITIN) 10 MG Tab TAKE ONE TABLET BY MOUTH DAILY 30 Tab 11   •  sodium chloride (AFRIN SALINE NASAL MIST) 0.65 % Solution Spray 1 Spray in nose as needed for Congestion. Use only 2 to 3 days. 1 Bottle 3   • alendronate (FOSAMAX) 70 MG Tab Take 70 mg by mouth every 7 days. Thursdays     • Cholecalciferol (VITAMIN D-3) 1000 UNITS Cap Take 2,000 Units by mouth every day.     • gabapentin (NEURONTIN) 300 MG Cap Take 1 Cap by mouth 3 times a day. (Patient not taking: Reported on 2/11/2019) 90 Cap 11   • SUMAtriptan (IMITREX) 50 MG Tab TAKE ONE TABLET BY MOUTH AT ONSET OF HEADACHE; MAY REPEAT ONE TABLET IN 2 HOURS AS NEEDED. MAX OF 2 DOSES A DAY 9 Tab 6   • amoxicillin-clavulanate (AUGMENTIN) 875-125 MG Tab Take 1 Tab by mouth 2 times a day. (Patient not taking: Reported on 2/11/2019) 20 Tab 0   • nitrofurantoin monohydr macro (MACROBID) 100 MG Cap Take 1 Cap by mouth 2 times a day. (Patient not taking: Reported on 2/11/2019) 10 Cap 0   • tramadol (ULTRAM) 50 MG Tab Take 1 Tab by mouth every four hours as needed for Moderate Pain. 90 Tab 3   • ondansetron (ZOFRAN) 4 MG Tab tablet Take 1 Tab by mouth every 8 hours as needed (FOR NAUSEA OR VIMITING) for up to 6 doses. 6 Each 2   • ibuprofen (MOTRIN) 200 MG Tab Take 600 mg by mouth every four hours as needed for Mild Pain.       No facility-administered encounter medications on file as of 2/11/2019.      Review of Systems   Constitutional: Negative for chills, fever, malaise/fatigue and weight loss.   HENT: Negative for ear discharge, ear pain, hearing loss and nosebleeds.    Eyes: Negative for blurred vision, double vision, pain and discharge.   Respiratory: Negative for cough and shortness of breath.    Cardiovascular: Negative for chest pain, palpitations, orthopnea, claudication, leg swelling and PND.   Gastrointestinal: Negative for abdominal pain, blood in stool, melena, nausea and vomiting.   Genitourinary: Negative for dysuria and hematuria.   Musculoskeletal: Negative for falls, joint pain and myalgias.   Skin: Negative for  "itching and rash.   Neurological: Negative for dizziness, sensory change, speech change, loss of consciousness and headaches.   Endo/Heme/Allergies: Negative for environmental allergies. Does not bruise/bleed easily.   Psychiatric/Behavioral: Negative for depression, hallucinations and suicidal ideas.        Objective:   /62 (BP Location: Right arm, Patient Position: Sitting, BP Cuff Size: Adult)   Pulse 70   Ht 1.702 m (5' 7\")   Wt 64 kg (141 lb)   LMP 08/28/2002   SpO2 94%   BMI 22.08 kg/m²     Physical Exam   Constitutional: She is oriented to person, place, and time. No distress.   HENT:   Head: Normocephalic and atraumatic.   Right Ear: External ear normal.   Left Ear: External ear normal.   Eyes: Right eye exhibits no discharge. Left eye exhibits no discharge.   Neck: No JVD present. No thyromegaly present.   Cardiovascular: Normal rate, regular rhythm, normal heart sounds and intact distal pulses.  Exam reveals no gallop and no friction rub.    No murmur heard.  Pulmonary/Chest: Breath sounds normal. No respiratory distress.   Abdominal: Bowel sounds are normal. She exhibits no distension. There is no tenderness.   Musculoskeletal: She exhibits no edema or tenderness.   Neurological: She is alert and oriented to person, place, and time. No cranial nerve deficit.   Skin: Skin is warm and dry. She is not diaphoretic.   Psychiatric: She has a normal mood and affect. Her behavior is normal.   Nursing note and vitals reviewed.      Assessment:     1. Elevated coronary artery calcium score: Approximately 720 18  Comp Metabolic Panel    LIPID PANEL   2. Dyslipidemia  Comp Metabolic Panel    LIPID PANEL   3. Leg swelling  Comp Metabolic Panel    LIPID PANEL   4. Abnormal screening cardiac CT  Comp Metabolic Panel    LIPID PANEL   5. High risk medication use  Comp Metabolic Panel    LIPID PANEL       Medical Decision Making:  Today's Assessment / Status / Plan:   Today, based on physical examination " findings, patient is euvolemic. No JVD, lungs are clear to auscultation, no pitting edema in bilateral lower extremities, no ascites.    Dry weight is 141 lbs.    Cont current medications at current dose.     I will see patient back in clinic with lab tests and studies results in 12 months.    I thank you Dr. Crawford for referring patient to our Cardiology Clinic today.

## 2019-02-13 ENCOUNTER — OCCUPATIONAL MEDICINE (OUTPATIENT)
Dept: OCCUPATIONAL MEDICINE | Facility: CLINIC | Age: 59
End: 2019-02-13
Payer: COMMERCIAL

## 2019-02-13 VITALS
HEART RATE: 87 BPM | BODY MASS INDEX: 22.44 KG/M2 | WEIGHT: 143 LBS | TEMPERATURE: 98.1 F | SYSTOLIC BLOOD PRESSURE: 116 MMHG | DIASTOLIC BLOOD PRESSURE: 78 MMHG | HEIGHT: 67 IN | OXYGEN SATURATION: 97 %

## 2019-02-13 DIAGNOSIS — S39.012D STRAIN OF LUMBAR REGION, SUBSEQUENT ENCOUNTER: ICD-10-CM

## 2019-02-13 PROCEDURE — 99204 OFFICE O/P NEW MOD 45 MIN: CPT | Performed by: PREVENTIVE MEDICINE

## 2019-02-13 RX ORDER — DICLOFENAC SODIUM 75 MG/1
75 TABLET, DELAYED RELEASE ORAL 2 TIMES DAILY
Qty: 60 TAB | Refills: 0 | Status: SHIPPED | OUTPATIENT
Start: 2019-02-13 | End: 2019-11-15

## 2019-02-13 NOTE — LETTER
[address],   [address] [address]  Phone:  [phone] - Fax:  [phone]   [facility] Progress Report and Disability Certification  Date of Service: 2/13/2019   No Show:  No  Date / Time of Next Visit: 3/6/2019@10:30AM   Claim Information   Patient Name: Evelyn Meeks  Claim Number:     Employer: [employer]  Date of Injury: 2/1/2019     Insurer / TPA: Workers Choice  ID / SSN:     Occupation: Security   Diagnosis: The encounter diagnosis was Strain of lumbar region, subsequent encounter.    Medical Information   Related to Industrial Injury? Yes    Subjective Complaints:  DOI 2/1/19: 59 yo female presents with low back pain. 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 25 lbs. Pain gradually worsened and was seen in UC.  Patient states overall lower back pain is about the same.  Pain is mostly on the right side.  Denies radiating pain, numbness or tingling.  Pain is worse with bending or lifting.  Taking gabapentin and occasional ibuprofen.   Objective Findings: Lumbar: No gross deformity.  Tenderness over upper right lumbar paraspinal musculature.  Slightly decreased flexion with mild discomfort, pain with left rotation.  Straight leg test negative.  Reflexes intact.  Strength intact.   Pre-Existing Condition(s):     Assessment:   Condition Same    Status: Additional Care Required  Permanent Disability:No    Plan:      Diagnostics:      Comments:  Prescribed diclofenac twice daily  Continue gabapentin  Okay to continue full duty  Follow-up 3 weeks, if no improvement will refer to physical therapy    Disability Information   Status: Released to Full Duty    From:  2/13/2019  Through: 3/6/2019 Restrictions are:     Physical Restrictions   Sitting:    Standing:    Stooping:    Bending:       Squatting:    Walking:    Climbing:    Pushing:      Pulling:    Other:    Reaching Above Shoulder (L):   Reaching Above Shoulder (R):       Reaching Below Shoulder (L):    Reaching Below Shoulder (R):      Not to exceed Weight Limits   Carrying(hrs):   Weight Limit(lb):   Lifting(hrs):   Weight  Limit(lb):     Comments:      Repetitive Actions   Hands: i.e. Fine Manipulations from Grasping:     Feet: i.e. Operating Foot Controls:     Driving / Operate Machinery:     Physician Name: Fernando Darling D.O. Physician Signature: FERNANDO Riley D.O. e-Signature: Dr. Noah Christian, Medical Director   Clinic Name / Location: [address],   [address] [place] [address] Clinic Phone Number: Dept: [phone]   Appointment Time: 11:30 Am Visit Start Time: 11:29 AM   Check-In Time:  11:27 Am Visit Discharge Time:  12:03PM   Original-Treating Physician or Chiropractor    Page 2-Insurer/TPA    Page 3-Employer    Page 4-Employee

## 2019-02-13 NOTE — PROGRESS NOTES
"Subjective:      Evelyn Meeks is a 58 y.o. female who presents with Other (DOI 02/01/19- back- )      DOI 2/1/19: 59 yo female presents with low back pain. 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 25 lbs. Pain gradually worsened and was seen in UC.  Patient states overall lower back pain is about the same.  Pain is mostly on the right side.  Denies radiating pain, numbness or tingling.  Pain is worse with bending or lifting.  Taking gabapentin and occasional ibuprofen.     HPI    ROS  ROS: All systems were reviewed on intake form, form was reviewed and signed. See scanned documents in media. Pertinent positives and negatives included in HPI.    PMH: No pertinent past medical history to this problem  MEDS: Medications were reviewed in Epic  ALLERGIES:   Allergies   Allergen Reactions   • Other Food Anaphylaxis     Wine coffee   • Shellfish Allergy Anaphylaxis     RXN=whole life   • Lidocaine (Anorectal) [Topicaine] Rash and Itching      patch glue       • Pet [Cat Hair Extract] Hives     RXN=whole life   • Pollen Extract Itching     RXN=whole life   • Tape    • Erythromycin Vomiting     SOCHX: Works as a security  at [employer]  FH: No pertinent family history to this problem     Objective:     /78   Pulse 87   Temp 36.7 °C (98.1 °F)   Ht 1.702 m (5' 7\")   Wt 64.9 kg (143 lb)   LMP 08/28/2002   SpO2 97%   BMI 22.40 kg/m²      Physical Exam   Constitutional: She is oriented to person, place, and time. She appears well-developed and well-nourished.   HENT:   Right Ear: External ear normal.   Left Ear: External ear normal.   Eyes: Conjunctivae and EOM are normal.   Cardiovascular: Normal rate.    Pulmonary/Chest: Effort normal.   Neurological: She is alert and oriented to person, place, and time.   Skin: Skin is warm and dry.   Psychiatric: She has a normal mood and " affect. Judgment normal.       Lumbar: No gross deformity.  Tenderness over upper right lumbar paraspinal musculature.  Slightly decreased flexion with mild discomfort, pain with left rotation.  Straight leg test negative.  Reflexes intact.  Strength intact.       Assessment/Plan:     1. Strain of lumbar region, subsequent encounter  - diclofenac EC (VOLTAREN) 75 MG Tablet Delayed Response; Take 1 Tab by mouth 2 times a day.  Dispense: 60 Tab; Refill: 0    Prescribed diclofenac twice daily  Continue gabapentin  Okay to continue full duty  Follow-up 3 weeks, if no improvement will refer to physical therapy

## 2019-03-06 ENCOUNTER — OFFICE VISIT (OUTPATIENT)
Dept: URGENT CARE | Facility: CLINIC | Age: 59
End: 2019-03-06
Payer: COMMERCIAL

## 2019-03-06 ENCOUNTER — OCCUPATIONAL MEDICINE (OUTPATIENT)
Dept: OCCUPATIONAL MEDICINE | Facility: CLINIC | Age: 59
End: 2019-03-06
Payer: COMMERCIAL

## 2019-03-06 ENCOUNTER — APPOINTMENT (OUTPATIENT)
Dept: RADIOLOGY | Facility: IMAGING CENTER | Age: 59
End: 2019-03-06
Attending: FAMILY MEDICINE
Payer: COMMERCIAL

## 2019-03-06 VITALS
HEART RATE: 67 BPM | SYSTOLIC BLOOD PRESSURE: 124 MMHG | RESPIRATION RATE: 16 BRPM | WEIGHT: 138 LBS | TEMPERATURE: 98 F | BODY MASS INDEX: 21.66 KG/M2 | OXYGEN SATURATION: 97 % | HEIGHT: 67 IN | DIASTOLIC BLOOD PRESSURE: 74 MMHG

## 2019-03-06 VITALS
HEIGHT: 67 IN | DIASTOLIC BLOOD PRESSURE: 70 MMHG | WEIGHT: 143 LBS | RESPIRATION RATE: 16 BRPM | HEART RATE: 68 BPM | SYSTOLIC BLOOD PRESSURE: 112 MMHG | BODY MASS INDEX: 22.44 KG/M2 | TEMPERATURE: 97.9 F | OXYGEN SATURATION: 96 %

## 2019-03-06 DIAGNOSIS — S13.4XXA WHIPLASH INJURY TO NECK, INITIAL ENCOUNTER: ICD-10-CM

## 2019-03-06 DIAGNOSIS — S39.012D STRAIN OF LUMBAR REGION, SUBSEQUENT ENCOUNTER: ICD-10-CM
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
## 2019-03-06 DIAGNOSIS — S16.1XXA NECK STRAIN, INITIAL ENCOUNTER: ICD-10-CM

## 2019-03-06 DIAGNOSIS — M54.2 NECK PAIN: ICD-10-CM

## 2019-03-06 PROCEDURE — 99214 OFFICE O/P EST MOD 30 MIN: CPT | Performed by: FAMILY MEDICINE

## 2019-03-06 PROCEDURE — 99212 OFFICE O/P EST SF 10 MIN: CPT | Performed by: PREVENTIVE MEDICINE

## 2019-03-06 PROCEDURE — 72040 X-RAY EXAM NECK SPINE 2-3 VW: CPT | Mod: TC | Performed by: FAMILY MEDICINE

## 2019-03-06 ASSESSMENT — ENCOUNTER SYMPTOMS
HEADACHES: 1
CONSTITUTIONAL NEGATIVE: 1
DIZZINESS: 0
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
## 2019-03-06 NOTE — LETTER
[address],   [address] [address]  Phone:  [phone] - Fax:  [phone]   [facility]  Date of Service: 3/6/2019   No Show:  No  Date / Time of Next Visit:  Release from care   Claim Information   Patient Name: Evelyn Meeks  Claim Number:     Employer: [employer]  Date of Injury: 2/1/2019     Insurer / TPA: Workers Choice  ID / SSN:     Occupation: Security   Diagnosis: The encounter diagnosis was Strain of lumbar region, subsequent encounter.    Medical Information   Related to Industrial Injury? Yes    Subjective Complaints:  DOI 2/1/19: 59 yo female presents with low back pain. 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 25 lbs. patient states the lower back pain is completely improved.  At this point is not had any pain.  Denies radiating pain, numbness or tingling.  Ready for release.   Objective Findings: Lumbar: No gross deformity.  No tenderness.  Full range of motion.  Normal gait.   Pre-Existing Condition(s):     Assessment:   Condition Improved    Status: Discharged /  MMI  Permanent Disability:No    Plan:      Diagnostics:      Comments:  Released from care  Full duty  Follow-up as needed    Disability Information   Status: Released to Full Duty    From:  3/6/2019  Through:   Restrictions are:     Physical Restrictions   Sitting:    Standing:    Stooping:    Bending:      Squatting:    Walking:    Climbing:    Pushing:      Pulling:    Other:    Reaching Above Shoulder (L):   Reaching Above Shoulder (R):       Reaching Below Shoulder (L):    Reaching Below Shoulder (R):      Not to exceed Weight Limits   Carrying(hrs):   Weight Limit(lb):   Lifting(hrs):   Weight  Limit(lb):     Comments:      Repetitive Actions   Hands: i.e. Fine  Manipulations from Grasping:     Feet: i.e. Operating Foot Controls:     Driving / Operate Machinery:     Physician Name: Fernando Darling D.O. Physician Signature: FERNANDO Riley D.O. e-Signature: Dr. Noah Christian, Medical Director   Clinic Name / Location: [address],   [address]  [place] [address] Clinic Phone Number: Dept: [phone]   Appointment Time: 10:30 Am Visit Start Time: 10:21 AM   Check-In Time:  10:20 Am Visit Discharge Time:  10:41 AM   Original-Treating Physician or Chiropractor    Page 2-Insurer/TPA    Page 3-Employer    Page 4-Employee

## 2019-03-06 NOTE — PROGRESS NOTES
"Subjective:      Evelyn Meeks is a 58 y.o. female who presents with Other ([facility] DOI 02/01/19- back, better, rm17)      DOI 2/1/19: 57 yo female presents with low back pain. 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 25 lbs. patient states the lower back pain is completely improved.  At this point is not had any pain.  Denies radiating pain, numbness or tingling.  Ready for release.     HPI    ROS       Objective:     /70   Pulse 68   Temp 36.6 °C (97.9 °F)   Resp 16   Ht 1.702 m (5' 7\")   Wt 64.9 kg (143 lb)   LMP 08/28/2002   SpO2 96%   BMI 22.40 kg/m²      Physical Exam    Lumbar: No gross deformity.  No tenderness.  Full range of motion.  Normal gait.       Assessment/Plan:     1. Strain of lumbar region, subsequent encounter  Released from care  Full duty  Follow-up as needed      "

## 2019-04-02 ENCOUNTER — OFFICE VISIT (OUTPATIENT)
Dept: URGENT CARE | Facility: CLINIC | Age: 59
End: 2019-04-02
Payer: COMMERCIAL

## 2019-04-02 VITALS
TEMPERATURE: 98.3 F | WEIGHT: 138 LBS | HEART RATE: 73 BPM | SYSTOLIC BLOOD PRESSURE: 114 MMHG | BODY MASS INDEX: 21.66 KG/M2 | HEIGHT: 67 IN | DIASTOLIC BLOOD PRESSURE: 68 MMHG | OXYGEN SATURATION: 96 % | RESPIRATION RATE: 16 BRPM

## 2019-04-02 DIAGNOSIS — R05.9 COUGH: ICD-10-CM

## 2019-04-02 DIAGNOSIS — J00 NASOPHARYNGITIS: ICD-10-CM

## 2019-04-02 LAB
INT CON NEG: NEGATIVE
INT CON POS: POSITIVE
S PYO AG THROAT QL: NEGATIVE

## 2019-04-02 PROCEDURE — 99214 OFFICE O/P EST MOD 30 MIN: CPT | Performed by: PHYSICIAN ASSISTANT

## 2019-04-02 PROCEDURE — 87880 STREP A ASSAY W/OPTIC: CPT | Performed by: PHYSICIAN ASSISTANT

## 2019-04-02 RX ORDER — BENZONATATE 100 MG/1
200 CAPSULE ORAL 3 TIMES DAILY PRN
Qty: 60 CAP | Refills: 0 | Status: SHIPPED | OUTPATIENT
Start: 2019-04-02 | End: 2019-06-11

## 2019-04-02 ASSESSMENT — ENCOUNTER SYMPTOMS
SINUS PAIN: 1
FEVER: 0
WHEEZING: 0
SHORTNESS OF BREATH: 0
CHILLS: 0
COUGH: 1
MYALGIAS: 0
HEADACHES: 0
SPUTUM PRODUCTION: 1
HEMOPTYSIS: 0
SORE THROAT: 1

## 2019-04-02 NOTE — PROGRESS NOTES
"Subjective:   Evelyn Meeks is a 58 y.o. female who presents for Sore Throat (x3 days, sore throat, bodyaches, sinus pressure and congestion)    This is a new problem.  Patient presents to urgent care with 3-day history of sore throat, generalized body aches, sinus pressure and nasal congestion as well as cough.  Cough is occasionally productive of slightly green tinged sputum.  Patient denies any shortness of breath or chest pain.  Denies fever.  She has had no known exposure to strep or flu.        HPI  Review of Systems   Constitutional: Negative for chills, fever and malaise/fatigue.   HENT: Positive for congestion, sinus pain and sore throat. Negative for ear pain.    Respiratory: Positive for cough and sputum production. Negative for hemoptysis, shortness of breath and wheezing.    Cardiovascular: Negative for chest pain.   Musculoskeletal: Negative for myalgias.   Skin: Negative for rash.   Neurological: Negative for headaches.   All other systems reviewed and are negative.    Allergies   Allergen Reactions   • Other Food Anaphylaxis     Wine coffee   • Shellfish Allergy Anaphylaxis     RXN=whole life   • Lidocaine (Anorectal) [Topicaine] Rash and Itching      patch glue       • Pet [Cat Hair Extract] Hives     RXN=whole life   • Pollen Extract Itching     RXN=whole life   • Tape    • Erythromycin Vomiting        Objective:   /68   Pulse 73   Temp 36.8 °C (98.3 °F) (Temporal)   Resp 16   Ht 1.702 m (5' 7\")   Wt 62.6 kg (138 lb)   LMP 08/28/2002   SpO2 96%   BMI 21.61 kg/m²   Physical Exam   Constitutional: She is oriented to person, place, and time. She appears well-developed and well-nourished. She does not appear ill. No distress.   HENT:   Head: Normocephalic and atraumatic.   Right Ear: Tympanic membrane, external ear and ear canal normal.   Left Ear: Tympanic membrane, external ear and ear canal normal.   Nose: Mucosal edema present. No rhinorrhea. Right sinus exhibits no " maxillary sinus tenderness and no frontal sinus tenderness. Left sinus exhibits no maxillary sinus tenderness and no frontal sinus tenderness.   Mouth/Throat: Uvula is midline and mucous membranes are normal. Posterior oropharyngeal erythema present. No oropharyngeal exudate. Tonsils are 1+ on the right. Tonsils are 1+ on the left. No tonsillar exudate.   Eyes: Pupils are equal, round, and reactive to light. Conjunctivae and EOM are normal.   Neck: Normal range of motion. Neck supple.   Cardiovascular: Normal rate, regular rhythm and normal heart sounds.  Exam reveals no gallop and no friction rub.    No murmur heard.  Pulmonary/Chest: Effort normal and breath sounds normal. No respiratory distress. She has no wheezes. She has no rales.   Abdominal: Soft. Bowel sounds are normal. She exhibits no distension and no mass. There is no tenderness. There is no rebound and no guarding.   Musculoskeletal: Normal range of motion. She exhibits no edema, tenderness or deformity.   Lymphadenopathy:        Head (right side): No submental, no submandibular and no tonsillar adenopathy present.        Head (left side): No submental, no submandibular and no tonsillar adenopathy present.     She has no cervical adenopathy.        Right: No supraclavicular adenopathy present.        Left: No supraclavicular adenopathy present.   Neurological: She is alert and oriented to person, place, and time. She has normal strength. No cranial nerve deficit or sensory deficit. Coordination normal.   Skin: Skin is warm and dry. No rash noted.   Psychiatric: She has a normal mood and affect. Judgment normal.   Vitals reviewed.          Assessment/Plan:   Assessment    1. Nasopharyngitis  - POCT Rapid Strep A  - maalox plus-benadryl-visc lidocaine (MAGIC MOUTHWASH); 10 ml swish, gargle and spit every 6 hours as needed for sore throat  Dispense: 120 mL; Refill: 0    2. Cough  - benzonatate (TESSALON) 100 MG Cap; Take 2 Caps by mouth 3 times a day as  needed.  Dispense: 60 Cap; Refill: 0    Rapid strep is negative.  Centor criteria 0/4.  No additional testing required at this time.  Symptomatic, supportive care.  Increase fluids, rest.  Patient is given a prescription for Magic mouthwash.  Recommend ice pops, cool fluids, salt water gargles.  She is also provided a prescription for Tessalon Perles as needed for cough.  Red flag warning symptoms and strict ER/follow-up precautions given.      Differential diagnosis, natural history, supportive care, and indications for immediate follow-up discussed.    If not improving in 3-5 days, F/U with PCP or return to  or sooner if worsens    Please note that this note was created using voice recognition speech to text software. Every effort has been made to correct obvious errors.  However, I expect there are errors of grammar and possibly context that were not discovered prior to finalizing the note

## 2019-05-23 ENCOUNTER — TELEPHONE (OUTPATIENT)
Dept: MEDICAL GROUP | Facility: LAB | Age: 59
End: 2019-05-23
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
## 2019-06-11 ENCOUNTER — OFFICE VISIT (OUTPATIENT)
Dept: URGENT CARE | Facility: CLINIC | Age: 59
End: 2019-06-11
Payer: COMMERCIAL

## 2019-06-11 VITALS
WEIGHT: 140 LBS | TEMPERATURE: 98.6 F | DIASTOLIC BLOOD PRESSURE: 72 MMHG | HEART RATE: 75 BPM | RESPIRATION RATE: 18 BRPM | BODY MASS INDEX: 21.97 KG/M2 | OXYGEN SATURATION: 97 % | SYSTOLIC BLOOD PRESSURE: 106 MMHG | HEIGHT: 67 IN

## 2019-06-11 DIAGNOSIS — R05.9 COUGH: ICD-10-CM

## 2019-06-11 DIAGNOSIS — J01.00 ACUTE NON-RECURRENT MAXILLARY SINUSITIS: ICD-10-CM

## 2019-06-11 DIAGNOSIS — Z86.19 HISTORY OF CLOSTRIDIUM DIFFICILE INFECTION: ICD-10-CM

## 2019-06-11 PROCEDURE — 99214 OFFICE O/P EST MOD 30 MIN: CPT | Performed by: PHYSICIAN ASSISTANT

## 2019-06-11 RX ORDER — BENZONATATE 100 MG/1
100-200 CAPSULE ORAL 3 TIMES DAILY PRN
Qty: 60 CAP | Refills: 0 | Status: SHIPPED | OUTPATIENT
Start: 2019-06-11 | End: 2019-11-15

## 2019-06-11 RX ORDER — DOXYCYCLINE HYCLATE 100 MG
100 TABLET ORAL 2 TIMES DAILY
Qty: 14 TAB | Refills: 0 | Status: SHIPPED | OUTPATIENT
Start: 2019-06-11 | End: 2019-06-18

## 2019-06-11 ASSESSMENT — ENCOUNTER SYMPTOMS
COUGH: 1
CHILLS: 0
SPUTUM PRODUCTION: 1
ABDOMINAL PAIN: 0
SHORTNESS OF BREATH: 0
WHEEZING: 0
VOMITING: 0
MUSCULOSKELETAL NEGATIVE: 1
FEVER: 0
HEMOPTYSIS: 0
DIZZINESS: 0
NAUSEA: 0
SINUS PAIN: 1
SORE THROAT: 1
HEADACHES: 1
DIARRHEA: 0

## 2019-06-11 ASSESSMENT — COPD QUESTIONNAIRES: COPD: 0

## 2019-06-11 NOTE — PROGRESS NOTES
"Subjective:      Evelyn Meeks is a 58 y.o. female who presents with Cough (productive cough,nasal congestion,chest discomfort-x4 days)            Cough   This is a new problem. The current episode started in the past 7 days (4 days). The problem has been gradually worsening. The problem occurs every few minutes. The cough is productive of sputum (yellow/green). Associated symptoms include chest pain, ear congestion, headaches, nasal congestion, postnasal drip and a sore throat. Pertinent negatives include no chills, ear pain, fever, hemoptysis, rash, shortness of breath or wheezing. She has tried OTC cough suppressant for the symptoms. The treatment provided mild relief. Her past medical history is significant for bronchitis and pneumonia. There is no history of asthma or COPD.     Patient presents to urgent care reporting a 4 day history of a productive cough, sinus pain/pressure, post nasal drip, and sore throat. No fevers, chills, body aches, chest pain, or SOB. No known sick contacts or recent use of antibiotics. No history of smoking. PMH is significant for C diff once after taking erythromycin.     Review of Systems   Constitutional: Negative for chills and fever.   HENT: Positive for congestion, postnasal drip, sinus pain and sore throat. Negative for ear pain.    Respiratory: Positive for cough and sputum production. Negative for hemoptysis, shortness of breath and wheezing.    Cardiovascular: Positive for chest pain.   Gastrointestinal: Negative for abdominal pain, diarrhea, nausea and vomiting.   Genitourinary: Negative.    Musculoskeletal: Negative.    Skin: Negative for rash.   Neurological: Positive for headaches. Negative for dizziness.        Objective:     /72 (BP Location: Left arm)   Pulse 75   Temp 37 °C (98.6 °F) (Temporal)   Resp 18   Ht 1.702 m (5' 7\")   Wt 63.5 kg (140 lb)   LMP 08/28/2002   SpO2 97%   BMI 21.93 kg/m²      Physical Exam   Constitutional: She is " oriented to person, place, and time. She appears well-developed and well-nourished. No distress.   HENT:   Head: Normocephalic and atraumatic.   Right Ear: Hearing, tympanic membrane, external ear and ear canal normal.   Left Ear: Hearing, tympanic membrane, external ear and ear canal normal.   Nose: Mucosal edema and rhinorrhea present. Right sinus exhibits maxillary sinus tenderness. Right sinus exhibits no frontal sinus tenderness. Left sinus exhibits maxillary sinus tenderness. Left sinus exhibits no frontal sinus tenderness.   Mouth/Throat: Posterior oropharyngeal erythema present. No oropharyngeal exudate or posterior oropharyngeal edema.   Eyes: Pupils are equal, round, and reactive to light. Conjunctivae are normal. Right eye exhibits no discharge. Left eye exhibits no discharge.   Neck: Normal range of motion.   Cardiovascular: Normal rate, regular rhythm and normal heart sounds.    No murmur heard.  Pulmonary/Chest: Effort normal and breath sounds normal. No respiratory distress. She has no wheezes. She has no rales.   Musculoskeletal: Normal range of motion.   Lymphadenopathy:     She has no cervical adenopathy.   Neurological: She is alert and oriented to person, place, and time.   Skin: Skin is warm and dry. She is not diaphoretic.   Psychiatric: She has a normal mood and affect. Her behavior is normal.   Nursing note and vitals reviewed.         PMH:  has a past medical history of Abnormal screening cardiac CT (6/20/2018); Anal fissure (10/6/2011); Back pain; Bronchitis (2015); Chicken pox; Disorder of thyroid; Dyslipidemia (8/19/2011); Gastroesophageal reflux disease (9/7/2012); Grief at loss of child (8/4/2015); Hemorrhoid; High cholesterol; Kidney stone; Measles; Migraine; Mumps; No pertinent past medical history; Osteoporosis (1/15/2016); Pain (11/20/2017); Pneumonia (2015); and UTI (lower urinary tract infection) (2014).  MEDS:   Current Outpatient Prescriptions:   •  doxycycline (VIBRAMYCIN) 100  MG Tab, Take 1 Tab by mouth 2 times a day for 7 days., Disp: 14 Tab, Rfl: 0  •  benzonatate (TESSALON) 100 MG Cap, Take 1-2 Caps by mouth 3 times a day as needed., Disp: 60 Cap, Rfl: 0  •  rosuvastatin (CRESTOR) 20 MG Tab, Take 1 Tab by mouth every evening., Disp: 90 Tab, Rfl: 3  •  levothyroxine (SYNTHROID) 50 MCG Tab, TAKE ONE TABLET BY MOUTH EVERY MORNING ON AN EMPTY STOMACH, Disp: 30 Tab, Rfl: 6  •  SUMAtriptan (IMITREX) 50 MG Tab, TAKE ONE TABLET BY MOUTH AT ONSET OF HEADACHE; MAY REPEAT ONE TABLET IN 2 HOURS AS NEEDED. MAX OF 2 DOSES A DAY, Disp: 9 Tab, Rfl: 6  •  loratadine (CLARITIN) 10 MG Tab, TAKE ONE TABLET BY MOUTH DAILY, Disp: 30 Tab, Rfl: 11  •  tramadol (ULTRAM) 50 MG Tab, Take 1 Tab by mouth every four hours as needed for Moderate Pain., Disp: 90 Tab, Rfl: 3  •  alendronate (FOSAMAX) 70 MG Tab, Take 70 mg by mouth every 7 days. Thursdays, Disp: , Rfl:   •  Cholecalciferol (VITAMIN D-3) 1000 UNITS Cap, Take 2,000 Units by mouth every day., Disp: , Rfl:   •  maalox plus-benadryl-visc lidocaine (MAGIC MOUTHWASH), 10 ml swish, gargle and spit every 6 hours as needed for sore throat, Disp: 120 mL, Rfl: 0  •  diclofenac EC (VOLTAREN) 75 MG Tablet Delayed Response, Take 1 Tab by mouth 2 times a day. (Patient not taking: Reported on 4/2/2019), Disp: 60 Tab, Rfl: 0  •  sodium chloride (AFRIN SALINE NASAL MIST) 0.65 % Solution, Spray 1 Spray in nose as needed for Congestion. Use only 2 to 3 days., Disp: 1 Bottle, Rfl: 3  •  ondansetron (ZOFRAN) 4 MG Tab tablet, Take 1 Tab by mouth every 8 hours as needed (FOR NAUSEA OR VIMITING) for up to 6 doses., Disp: 6 Each, Rfl: 2  •  ibuprofen (MOTRIN) 200 MG Tab, Take 600 mg by mouth every four hours as needed for Mild Pain., Disp: , Rfl:   ALLERGIES:   Allergies   Allergen Reactions   • Other Food Anaphylaxis     Wine coffee   • Shellfish Allergy Anaphylaxis     RXN=whole life   • Lidocaine (Anorectal) [Topicaine] Rash and Itching      patch glue       • Pet [Cat Hair  Extract] Hives     RXN=whole life   • Pollen Extract Itching     RXN=whole life   • Tape    • Erythromycin Vomiting     SURGHX:   Past Surgical History:   Procedure Laterality Date   • CARPAL TUNNEL RELEASE Right 11/21/2017    Procedure: CARPAL TUNNEL RELEASE - REVISION;  Surgeon: Lizandro Weber M.D.;  Location: [facility];  Service: Orthopedics   • GUYONS TUNNEL RELEASE Right 11/21/2017    Procedure: GUYONS TUNNEL RELEASE;  Surgeon: Lizandro Weber M.D.;  Location: SURGERY [facility];  Service: Orthopedics   • HARDWARE REMOVAL ORTHO Right 11/21/2017    Procedure: HARDWARE REMOVAL ORTHO - OIC DISTAL RADIUS PLATE;  Surgeon: Lizandro Weber M.D.;  Location: SURGERY [facility];  Service: Orthopedics   • CARPAL TUNNEL RELEASE Right 1/20/2017    Procedure: CARPAL TUNNEL RELEASE;  Surgeon: Jono Maynard M.D.;  Location: [facility];  Service:    • PB INJECT NERV BLCK,INTERCOST,MULTPL  12/12/2016    Procedure: INJ-INTERCOSTAL MULTIPLE - T9, T10;  Surgeon: Rafat Nichols D.O.;  Location: SURGERY [facility];  Service: Pain Management   • WRIST ORIF Right 8/10/2016    Procedure: WRIST ORIF;  Surgeon: Jono Maynard M.D.;  Location: [facility];  Service:    • ABDOMINAL HYSTERECTOMY TOTAL     • GYN SURGERY      c sections x 2   • GYN SURGERY      multiple D&C's   • HEMORRHOIDECTOMY     • TONSILLECTOMY       SOCHX:  reports that she has never smoked. She has never used smokeless tobacco. She reports that she does not drink alcohol or use drugs.  FH: family history includes Cancer in her son; Diabetes in her mother; Heart Attack (age of onset: 64) in her father; Heart Disease in her father.       Assessment/Plan:     1. Acute non-recurrent maxillary sinusitis  - doxycycline (VIBRAMYCIN) 100 MG Tab; Take 1 Tab by mouth 2 times a day for 7 days.  Dispense: 14 Tab; Refill: 0   - Complete full course of antibiotics as prescribed     Discussed use of nedi-pot,  humidifier, and Flonase nasal spray for symptomatic relief. Call or return to office if symptoms persist or worsen. The patient demonstrated a good understanding and agreed with the treatment plan.    2. Cough  - benzonatate (TESSALON) 100 MG Cap; Take 1-2 Caps by mouth 3 times a day as needed.  Dispense: 60 Cap; Refill: 0    3. History of Clostridium difficile infection    Discussed taking OTC probiotics while taking course of antibiotics. Seek medical attention if any diarrhea develops while taking antibiotics. The patient demonstrated a good understanding and agreed with the treatment plan.

## 2019-06-17 ENCOUNTER — OFFICE VISIT (OUTPATIENT)
Dept: MEDICAL GROUP | Facility: LAB | Age: 59
End: 2019-06-17
Payer: COMMERCIAL

## 2019-06-17 VITALS
HEIGHT: 67 IN | TEMPERATURE: 98.6 F | BODY MASS INDEX: 21.66 KG/M2 | RESPIRATION RATE: 12 BRPM | HEART RATE: 60 BPM | SYSTOLIC BLOOD PRESSURE: 108 MMHG | WEIGHT: 138 LBS | DIASTOLIC BLOOD PRESSURE: 60 MMHG | OXYGEN SATURATION: 99 %

## 2019-06-17 DIAGNOSIS — F43.9 STRESS: ICD-10-CM

## 2019-06-17 DIAGNOSIS — E78.5 DYSLIPIDEMIA: ICD-10-CM

## 2019-06-17 PROCEDURE — 99213 OFFICE O/P EST LOW 20 MIN: CPT | Performed by: INTERNAL MEDICINE

## 2019-06-17 ASSESSMENT — PATIENT HEALTH QUESTIONNAIRE - PHQ9: CLINICAL INTERPRETATION OF PHQ2 SCORE: 0

## 2019-06-17 NOTE — PROGRESS NOTES
CC: Evelyn Meeks is a 58 y.o. female is suffering from   Chief Complaint   Patient presents with   • Other     6 months follow up         SUBJECTIVE:  1. 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  She is to undergo healthy tracts will have her cholesterol checked there    2. Stress  Patient is under considerable amounts of stress associated with grandmother now raising 4 children        Past social, family, history:   Social History   Substance Use Topics   • Smoking status: Never Smoker   • Smokeless tobacco: Never Used   • Alcohol use No         MEDICATIONS:    Current Outpatient Prescriptions:   •  rosuvastatin (CRESTOR) 20 MG Tab, Take 1 Tab by mouth every evening., Disp: 90 Tab, Rfl: 3  •  levothyroxine (SYNTHROID) 50 MCG Tab, TAKE ONE TABLET BY MOUTH EVERY MORNING ON AN EMPTY STOMACH, Disp: 30 Tab, Rfl: 6  •  alendronate (FOSAMAX) 70 MG Tab, Take 70 mg by mouth every 7 days. Thursdays, Disp: , Rfl:   •  Cholecalciferol (VITAMIN D-3) 1000 UNITS Cap, Take 2,000 Units by mouth every day., Disp: , Rfl:   •  doxycycline (VIBRAMYCIN) 100 MG Tab, Take 1 Tab by mouth 2 times a day for 7 days., Disp: 14 Tab, Rfl: 0  •  benzonatate (TESSALON) 100 MG Cap, Take 1-2 Caps by mouth 3 times a day as needed., Disp: 60 Cap, Rfl: 0  •  maalox plus-benadryl-visc lidocaine (MAGIC MOUTHWASH), 10 ml swish, gargle and spit every 6 hours as needed for sore throat, Disp: 120 mL, Rfl: 0  •  diclofenac EC (VOLTAREN) 75 MG Tablet Delayed Response, Take 1 Tab by mouth 2 times a day. (Patient not taking: Reported on 4/2/2019), Disp: 60 Tab, Rfl: 0  •  SUMAtriptan (IMITREX) 50 MG Tab, TAKE ONE TABLET BY MOUTH AT ONSET OF HEADACHE; MAY REPEAT ONE TABLET IN 2 HOURS AS NEEDED. MAX OF 2 DOSES A DAY, Disp: 9 Tab, Rfl: 6  •  loratadine (CLARITIN) 10 MG Tab, TAKE ONE TABLET BY MOUTH DAILY, Disp: 30 Tab, Rfl: 11  •  sodium chloride (AFRIN SALINE NASAL MIST) 0.65 % Solution,  Spray 1 Spray in nose as needed for Congestion. Use only 2 to 3 days., Disp: 1 Bottle, Rfl: 3  •  tramadol (ULTRAM) 50 MG Tab, Take 1 Tab by mouth every four hours as needed for Moderate Pain., Disp: 90 Tab, Rfl: 3  •  ondansetron (ZOFRAN) 4 MG Tab tablet, Take 1 Tab by mouth every 8 hours as needed (FOR NAUSEA OR VIMITING) for up to 6 doses., Disp: 6 Each, Rfl: 2  •  ibuprofen (MOTRIN) 200 MG Tab, Take 600 mg by mouth every four hours as needed for Mild Pain., Disp: , Rfl:     PROBLEMS:  Patient Active Problem List    Diagnosis Date Noted   • Elevated coronary artery calcium score: Approximately 720 18 07/06/2018   • Abnormal screening cardiac CT 06/20/2018   • Carpal tunnel syndrome on right 01/20/2017   • Thoracic back pain 12/12/2016   • Closed fracture of distal end of right radius 08/10/2016   • Other specified hypothyroidism 07/19/2016   • Osteoporosis 01/15/2016   • Grief at loss of child 08/04/2015   • ADD (attention deficit disorder) 03/26/2013   • Gastroesophageal reflux disease 09/07/2012   • Anal fissure 10/06/2011   • Dyslipidemia 08/19/2011       REVIEW OF SYSTEMS:  Gen.:  No Nausea, Vomiting, fever, Chills.  Heart: No chest pain.  Lungs:  No shortness of Breath.  Psychological: Significant stress associate with raising 4 children age 6 months to 9 years of age     PHYSICAL EXAM   Constitutional: Alert, cooperative, not in acute distress.  Cardiovascular:  Rate Rhythm is regular without murmurs rubs clicks.     Thorax & Lungs: Clear to auscultation, no wheezing, rhonchi, or rales  HENT: Normocephalic, Atraumatic.  Eyes: PERRLA, EOMI, Conjunctiva normal.   Neck: Trachia is midline no swelling of the thyroid.   Lymphatic: No lymphadenopathy noted.   Neurologic: Alert & oriented x 3, cranial nerves II through XII are intact, Normal motor function, Normal sensory function, No focal deficits noted.   Psychiatric: Affect normal, Judgment normal, Mood normal.     VITAL SIGNS:/60   Pulse 60   Temp 37  "°C (98.6 °F) (Temporal)   Resp 12   Ht 1.702 m (5' 7\")   Wt 62.6 kg (138 lb)   LMP 08/28/2002   SpO2 99%   BMI 21.61 kg/m²     Labs: Reviewed    Assessment:                                                     Plan:    1. Dyslipidemia  Patient's complete healthy tract    2. Stress  Stress with being a  to her grandchildren        "

## 2019-06-21 ENCOUNTER — HOSPITAL ENCOUNTER (OUTPATIENT)
Dept: LAB | Facility: MEDICAL CENTER | Age: 59
End: 2019-06-21
Payer: COMMERCIAL

## 2019-06-21 LAB
BDY FAT % MEASURED: 30.3 %
BP DIAS: 59 MMHG
BP SYS: 103 MMHG
CHOLEST SERPL-MCNC: 155 MG/DL (ref 100–199)
DIABETES HTDIA: NO
EVENT NAME HTEVT: NORMAL
FASTING HTFAS: YES
GLUCOSE SERPL-MCNC: 94 MG/DL (ref 65–99)
HDLC SERPL-MCNC: 62 MG/DL
HYPERTENSION HTHYP: NO
LDLC SERPL CALC-MCNC: 78 MG/DL
SCREENING LOC CITY HTCIT: NORMAL
SCREENING LOC STATE HTSTA: NORMAL
SCREENING LOCATION HTLOC: NORMAL
SMOKING HTSMO: NO
SUBSCRIBER ID HTSID: NORMAL
TRIGL SERPL-MCNC: 76 MG/DL (ref 0–149)

## 2019-06-21 PROCEDURE — 36415 COLL VENOUS BLD VENIPUNCTURE: CPT

## 2019-06-21 PROCEDURE — 82947 ASSAY GLUCOSE BLOOD QUANT: CPT

## 2019-06-21 PROCEDURE — 80061 LIPID PANEL: CPT

## 2019-06-21 PROCEDURE — S5190 WELLNESS ASSESSMENT BY NONPH: HCPCS

## 2019-07-22 ENCOUNTER — NON-PROVIDER VISIT (OUTPATIENT)
Dept: OCCUPATIONAL MEDICINE | Facility: CLINIC | Age: 59
End: 2019-07-22

## 2019-07-22 DIAGNOSIS — Z11.1 ENCOUNTER FOR PPD TEST: ICD-10-CM

## 2019-07-22 PROCEDURE — 86580 TB INTRADERMAL TEST: CPT | Performed by: NURSE PRACTITIONER

## 2019-07-24 ENCOUNTER — NON-PROVIDER VISIT (OUTPATIENT)
Dept: OCCUPATIONAL MEDICINE | Facility: CLINIC | Age: 59
End: 2019-07-24

## 2019-07-24 LAB — TB WHEAL 3D P 5 TU DIAM: NORMAL MM

## 2019-08-23 ENCOUNTER — TELEPHONE (OUTPATIENT)
Dept: MEDICAL GROUP | Facility: LAB | Age: 59
End: 2019-08-23

## 2019-08-23 NOTE — LETTER
August 23, 2019        Patient: Evelyn Meeks   YOB: 1960   Date of Visit: 8/23/2019           To Whom It May Concern:    It is my medical opinion that Evelyn Meeks is not on medication which will impair her ability to care for her grandchildren.    If you have any questions or concerns, please don't hesitate to call.        Sincerely,          Segundo Crawford D.O.  Board Certified General Internal Medicine.      [facility] - [address] [id number] [phone] (Phone)  [phone] (Fax)

## 2019-08-23 NOTE — TELEPHONE ENCOUNTER
Pt needs a letter stating the medications she is on will not effect her taking care of her grandchildren. She just received custody of them they need this today by 5 pm. Okay to send through my chart

## 2019-09-24 ENCOUNTER — IMMUNIZATION (OUTPATIENT)
Dept: OCCUPATIONAL MEDICINE | Facility: CLINIC | Age: 59
End: 2019-09-24

## 2019-09-24 DIAGNOSIS — Z23 NEED FOR VACCINATION: ICD-10-CM

## 2019-09-26 PROCEDURE — 90686 IIV4 VACC NO PRSV 0.5 ML IM: CPT | Performed by: PREVENTIVE MEDICINE

## 2019-10-17 ENCOUNTER — APPOINTMENT (OUTPATIENT)
Dept: MEDICAL GROUP | Facility: LAB | Age: 59
End: 2019-10-17
Payer: COMMERCIAL

## 2019-10-29 ENCOUNTER — OFFICE VISIT (OUTPATIENT)
Dept: MEDICAL GROUP | Facility: LAB | Age: 59
End: 2019-10-29
Payer: COMMERCIAL

## 2019-10-29 VITALS
BODY MASS INDEX: 20.53 KG/M2 | OXYGEN SATURATION: 100 % | HEIGHT: 67 IN | SYSTOLIC BLOOD PRESSURE: 125 MMHG | TEMPERATURE: 97.3 F | HEART RATE: 60 BPM | RESPIRATION RATE: 20 BRPM | WEIGHT: 130.8 LBS | DIASTOLIC BLOOD PRESSURE: 75 MMHG

## 2019-10-29 DIAGNOSIS — R93.1 ABNORMAL SCREENING CARDIAC CT: ICD-10-CM

## 2019-10-29 DIAGNOSIS — G43.001 MIGRAINE WITHOUT AURA AND WITH STATUS MIGRAINOSUS, NOT INTRACTABLE: ICD-10-CM

## 2019-10-29 DIAGNOSIS — Z91.09 ENVIRONMENTAL ALLERGIES: ICD-10-CM

## 2019-10-29 DIAGNOSIS — E78.5 DYSLIPIDEMIA: ICD-10-CM

## 2019-10-29 DIAGNOSIS — E03.2 HYPOTHYROIDISM DUE TO MEDICATION: ICD-10-CM

## 2019-10-29 DIAGNOSIS — L40.9 PSORIASIS: ICD-10-CM

## 2019-10-29 PROCEDURE — 99214 OFFICE O/P EST MOD 30 MIN: CPT | Performed by: INTERNAL MEDICINE

## 2019-10-29 RX ORDER — ROSUVASTATIN CALCIUM 20 MG/1
20 TABLET, COATED ORAL EVERY EVENING
Qty: 90 TAB | Refills: 3 | Status: SHIPPED | OUTPATIENT
Start: 2019-10-29 | End: 2020-03-12 | Stop reason: SDUPTHER

## 2019-10-29 RX ORDER — LEVOTHYROXINE SODIUM 0.05 MG/1
TABLET ORAL
Qty: 30 TAB | Refills: 6 | Status: SHIPPED | OUTPATIENT
Start: 2019-10-29 | End: 2019-11-11

## 2019-10-29 RX ORDER — SUMATRIPTAN 50 MG/1
TABLET, FILM COATED ORAL
Qty: 9 TAB | Refills: 6 | Status: SHIPPED
Start: 2019-10-29 | End: 2020-01-12

## 2019-10-29 RX ORDER — LORATADINE 10 MG/1
TABLET ORAL
Qty: 30 TAB | Refills: 11 | Status: SHIPPED
Start: 2019-10-29 | End: 2020-01-12

## 2019-10-29 RX ORDER — TRIAMCINOLONE ACETONIDE 1 MG/G
0.25 OINTMENT TOPICAL 2 TIMES DAILY
Qty: 1 TUBE | Refills: 0 | Status: SHIPPED
Start: 2019-10-29 | End: 2020-01-12

## 2019-11-11 RX ORDER — LEVOTHYROXINE SODIUM 0.05 MG/1
TABLET ORAL
Qty: 90 TAB | Refills: 3 | Status: SHIPPED
Start: 2019-11-11 | End: 2020-01-12

## 2019-11-15 ENCOUNTER — OFFICE VISIT (OUTPATIENT)
Dept: URGENT CARE | Facility: CLINIC | Age: 59
End: 2019-11-15
Payer: COMMERCIAL

## 2019-11-15 VITALS
RESPIRATION RATE: 12 BRPM | DIASTOLIC BLOOD PRESSURE: 66 MMHG | BODY MASS INDEX: 20.06 KG/M2 | HEIGHT: 67 IN | HEART RATE: 74 BPM | WEIGHT: 127.8 LBS | OXYGEN SATURATION: 98 % | TEMPERATURE: 98.4 F | SYSTOLIC BLOOD PRESSURE: 100 MMHG

## 2019-11-15 DIAGNOSIS — J02.9 EXUDATIVE PHARYNGITIS: ICD-10-CM

## 2019-11-15 DIAGNOSIS — J01.40 ACUTE NON-RECURRENT PANSINUSITIS: ICD-10-CM

## 2019-11-15 DIAGNOSIS — J40 BRONCHITIS: Primary | ICD-10-CM

## 2019-11-15 DIAGNOSIS — R30.0 DYSURIA: ICD-10-CM

## 2019-11-15 LAB
APPEARANCE UR: CLEAR
BILIRUB UR STRIP-MCNC: NEGATIVE MG/DL
COLOR UR AUTO: YELLOW
GLUCOSE UR STRIP.AUTO-MCNC: NEGATIVE MG/DL
INT CON NEG: NEGATIVE
INT CON POS: POSITIVE
KETONES UR STRIP.AUTO-MCNC: NEGATIVE MG/DL
LEUKOCYTE ESTERASE UR QL STRIP.AUTO: NEGATIVE
NITRITE UR QL STRIP.AUTO: NEGATIVE
PH UR STRIP.AUTO: 5.5 [PH] (ref 5–8)
PROT UR QL STRIP: 30 MG/DL
RBC UR QL AUTO: NEGATIVE
S PYO AG THROAT QL: NEGATIVE
SP GR UR STRIP.AUTO: 1.03
UROBILINOGEN UR STRIP-MCNC: 0.2 MG/DL

## 2019-11-15 PROCEDURE — 81002 URINALYSIS NONAUTO W/O SCOPE: CPT | Performed by: PHYSICIAN ASSISTANT

## 2019-11-15 PROCEDURE — 87880 STREP A ASSAY W/OPTIC: CPT | Performed by: PHYSICIAN ASSISTANT

## 2019-11-15 PROCEDURE — 99214 OFFICE O/P EST MOD 30 MIN: CPT | Performed by: PHYSICIAN ASSISTANT

## 2019-11-15 RX ORDER — SULFAMETHOXAZOLE AND TRIMETHOPRIM 800; 160 MG/1; MG/1
1 TABLET ORAL 2 TIMES DAILY
Qty: 14 TAB | Refills: 0 | Status: SHIPPED | OUTPATIENT
Start: 2019-11-15 | End: 2019-11-22

## 2019-11-15 RX ORDER — METHYLPREDNISOLONE 4 MG/1
TABLET ORAL
Qty: 21 TAB | Refills: 0 | Status: SHIPPED | OUTPATIENT
Start: 2019-11-15 | End: 2020-01-12

## 2019-11-15 RX ORDER — PROMETHAZINE HYDROCHLORIDE AND CODEINE PHOSPHATE 6.25; 1 MG/5ML; MG/5ML
5 SYRUP ORAL 4 TIMES DAILY PRN
Qty: 120 ML | Refills: 0 | Status: SHIPPED | OUTPATIENT
Start: 2019-11-15 | End: 2019-11-22

## 2019-11-15 NOTE — PROGRESS NOTES
Subjective:      Pt is a 59 y.o. female who presents with Sore throat and possible UTI          HPI  This is a new problem. PT comes into the UC with a chief complaint of dysuria, burning on urination, urgency, frequency, and bladder pressure x 1-2 days. PT denies fevers or chills, CP, SOB, NVD, paresthesias, headaches, dizziness, change in vision, hives, or joint pain. PT states the pain is a 6/10 with burning upon urination, aching in nature and worse at night. Pt states they have not taken any RX meds for this issue. Pt denies flank or back pain as well. The pt's medication list, problem list, and allergies have been evaluated and reviewed during today's visit.    PT presents to UC clinic today complaining of sore throat,  fatigue, runny nose. Pt notes grandchildren with strep at home. PT denies CP, SOB, NVD, abdominal pain, joint pain. PT states these symptoms began around 3 days ago. PT states the pain is a 7/10, aching in nature and worse at night.  Pt has not taken any RX medications for this condition. The pt's medication list, problem list, and allergies have been evaluated and reviewed during today's visit.      PMH:  Past Medical History:   Diagnosis Date   • Abnormal screening cardiac CT 6/20/2018   • Anal fissure 10/6/2011   • Back pain     left rib pain from fracture 1/2016   • Bronchitis 2015   • Chicken pox     as child   • Disorder of thyroid    • Dyslipidemia 8/19/2011   • Gastroesophageal reflux disease 9/7/2012   • Grief at loss of child 8/4/2015   • Hemorrhoid    • High cholesterol    • Kidney stone    • Measles     as child   • Migraine    • Mumps     as child   • No pertinent past medical history    • Osteoporosis 1/15/2016   • Pain 11/20/2017    left side rib   • Pneumonia 2015   • UTI (lower urinary tract infection) 2014       PSH:  Past Surgical History:   Procedure Laterality Date   • CARPAL TUNNEL RELEASE Right 11/21/2017    Procedure: CARPAL TUNNEL RELEASE - REVISION;  Surgeon: Lizandro CORCORAN  MADHURI Weber;  Location: SURGERY [facility];  Service: Orthopedics   • GUYONS TUNNEL RELEASE Right 2017    Procedure: GUYONS TUNNEL RELEASE;  Surgeon: Lizandro Weber M.D.;  Location: SURGERY [facility];  Service: Orthopedics   • HARDWARE REMOVAL ORTHO Right 2017    Procedure: HARDWARE REMOVAL ORTHO - OIC DISTAL RADIUS PLATE;  Surgeon: Lizandro Weber M.D.;  Location: SURGERY [facility];  Service: Orthopedics   • CARPAL TUNNEL RELEASE Right 2017    Procedure: CARPAL TUNNEL RELEASE;  Surgeon: Jono Maynard M.D.;  Location: SURGERY [facility];  Service:    • PB INJECT NERV BLCK,INTERCOST,MULTPL  2016    Procedure: INJ-INTERCOSTAL MULTIPLE - T9, T10;  Surgeon: Rafat Nichols D.O.;  Location: SURGERY [facility];  Service: Pain Management   • WRIST ORIF Right 8/10/2016    Procedure: WRIST ORIF;  Surgeon: Jono Maynard M.D.;  Location: SURGERY [facility];  Service:    • ABDOMINAL HYSTERECTOMY TOTAL     • GYN SURGERY      c sections x 2   • GYN SURGERY      multiple D&C's   • HEMORRHOIDECTOMY     • TONSILLECTOMY         Fam Hx:    family history includes Cancer in her son; Diabetes in her mother; Heart Attack (age of onset: 64) in her father; Heart Disease in her father.  Family Status   Relation Name Status   • Mo     • Fa     • Son  Alive   • Sis  Alive   • Bro  Alive   • Bro  Alive   • Bro  Alive   • Bro  Alive   • Sis  Alive   • Sis  Alive   • Son  Alive   • Son  Alive       Soc HX:  Social History     Socioeconomic History   • Marital status:      Spouse name: Not on file   • Number of children: Not on file   • Years of education: Not on file   • Highest education level: Not on file   Occupational History   • Not on file   Social Needs   • Financial resource strain: Not on file   • Food insecurity:     Worry: Not on file     Inability: Not on file   • Transportation needs:     Medical: Not on file     Non-medical: Not on file    Tobacco Use   • Smoking status: Never Smoker   • Smokeless tobacco: Never Used   Substance and Sexual Activity   • Alcohol use: No     Alcohol/week: 0.0 oz   • Drug use: No   • Sexual activity: Yes     Partners: Male   Lifestyle   • Physical activity:     Days per week: Not on file     Minutes per session: Not on file   • Stress: Not on file   Relationships   • Social connections:     Talks on phone: Not on file     Gets together: Not on file     Attends Islam service: Not on file     Active member of club or organization: Not on file     Attends meetings of clubs or organizations: Not on file     Relationship status: Not on file   • Intimate partner violence:     Fear of current or ex partner: Not on file     Emotionally abused: Not on file     Physically abused: Not on file     Forced sexual activity: Not on file   Other Topics Concern   • Not on file   Social History Narrative    ** Merged History Encounter **              Medications:    Current Outpatient Medications:   •  levothyroxine (SYNTHROID) 50 MCG Tab, TAKE ONE TABLET BY MOUTH EVERY MORNING ON AN EMPTY STOMACH, Disp: 90 Tab, Rfl: 3  •  triamcinolone acetonide (KENALOG) 0.1 % Ointment, Apply 0.25 g to affected area(s) 2 times a day., Disp: 1 Tube, Rfl: 0  •  rosuvastatin (CRESTOR) 20 MG Tab, Take 1 Tab by mouth every evening., Disp: 90 Tab, Rfl: 3  •  SUMAtriptan (IMITREX) 50 MG Tab, TAKE ONE TABLET BY MOUTH AT ONSET OF HEADACHE; MAY REPEAT ONE TABLET IN 2 HOURS AS NEEDED. MAX OF 2 DOSES A DAY, Disp: 9 Tab, Rfl: 6  •  loratadine (CLARITIN) 10 MG Tab, TAKE ONE TABLET BY MOUTH DAILY, Disp: 30 Tab, Rfl: 11  •  benzonatate (TESSALON) 100 MG Cap, Take 1-2 Caps by mouth 3 times a day as needed., Disp: 60 Cap, Rfl: 0  •  sodium chloride (AFRIN SALINE NASAL MIST) 0.65 % Solution, Spray 1 Spray in nose as needed for Congestion. Use only 2 to 3 days., Disp: 1 Bottle, Rfl: 3  •  tramadol (ULTRAM) 50 MG Tab, Take 1 Tab by mouth every four hours as needed for  Moderate Pain., Disp: 90 Tab, Rfl: 3  •  alendronate (FOSAMAX) 70 MG Tab, Take 70 mg by mouth every 7 days. Thursdays, Disp: , Rfl:   •  Cholecalciferol (VITAMIN D-3) 1000 UNITS Cap, Take 2,000 Units by mouth every day., Disp: , Rfl:   •  ibuprofen (MOTRIN) 200 MG Tab, Take 600 mg by mouth every four hours as needed for Mild Pain., Disp: , Rfl:       Allergies:  Other food; Shellfish allergy; Lidocaine (anorectal) [topicaine]; Pet [cat hair extract]; Pollen extract; Tape; and Erythromycin    ROS  Constitutional: Positive for chills, fevers, body aches and malaise/fatigue.   HENT: Positive for congestion and sore throat. Negative for ear pain.    Eyes: Negative for blurred vision, double vision and photophobia.   Respiratory: Positive for cough and sputum production. Negative for hemoptysis, shortness of breath and wheezing.    Cardiovascular: Negative for chest pain and palpitations.   Gastrointestinal: Negative for nausea, vomiting, abdominal pain, diarrhea and constipation.   Genitourinary: Positive for dysuria, urgency and frequency.  Musculoskeletal: Negative for falls and myalgias.   Skin: Negative for itching and rash.   Neurological: Positive for headaches. Negative for dizziness and tingling.   Endo/Heme/Allergies: Does not bruise/bleed easily.   Psychiatric/Behavioral: Negative for depression. The patient is not nervous/anxious.             Objective:     [facility] 08/28/2002    Vitals:    11/15/19 0850   BP: 100/66   Pulse: 74   Resp: 12   Temp: 36.9 °C (98.4 °F)   SpO2: 98%         Physical Exam      Constitutional: PT is oriented to person, place, and time. PT appears well-developed and well-nourished. No distress.   HENT:   Head: Normocephalic and atraumatic.   Right Ear: Hearing, tympanic membrane, external ear and ear canal normal.   Left Ear: Hearing, tympanic membrane, external ear and ear canal normal.   Nose: Mucosal edema, rhinorrhea and sinus tenderness present. Right sinus exhibits frontal sinus  tenderness. Left sinus exhibits frontal sinus tenderness.   Mouth/Throat: Uvula is midline. Mucous membranes are pale. Posterior oropharyngeal edema and posterior oropharyngeal erythema with exudate noted on exam.   Eyes: Conjunctivae normal and EOM are normal. Pupils are equal, round, and reactive to light.   Neck: Normal range of motion. Neck supple. No thyromegaly present.   Cardiovascular: Normal rate, regular rhythm, normal heart sounds and intact distal pulses.  Exam reveals no gallop and no friction rub.    No murmur heard.  Pulmonary/Chest: Effort normal and breath sounds normal. No respiratory distress. PT has no wheezes. PT has no rales. PT exhibits no tenderness.   Abdominal: Soft. Bowel sounds are normal. PT exhibits no distension and no mass. There is no tenderness. There is no rebound and no guarding.   Musculoskeletal: Normal range of motion. PT exhibits no edema and no tenderness.   Lymphadenopathy:     PT has no cervical adenopathy.   Neurological: PT is alert and oriented to person, place, and time. PT displays normal reflexes. No cranial nerve deficit. PT exhibits normal muscle tone. Coordination normal.   Skin: Skin is warm and dry. No rash noted. No erythema.   Psychiatric: PT has a normal mood and affect. PT behavior is normal. Judgment and thought content normal.        Assessment/Plan:       1. Exudative pharyngitis    2. Dysuria    POC u/a-->WNL, small prot  POC strep-->NEG  Bactrim  Medrol pack  Codeine cough syrup  Rest, fluids encouraged.  OTC decongestant for congestion/cough  AVS with medical info given.  Pt was in full understanding and agreement with the plan.  Differential diagnosis, natural history, supportive care, and indications for immediate follow-up discussed. All questions answered. Patient agrees with the plan of care.  Follow-up as needed if symptoms worsen or fail to improve to PCP, Urgent care or Emergency Room.

## 2020-01-12 ENCOUNTER — HOSPITAL ENCOUNTER (EMERGENCY)
Facility: MEDICAL CENTER | Age: 60
End: 2020-01-12
Attending: EMERGENCY MEDICINE
Payer: COMMERCIAL

## 2020-01-12 ENCOUNTER — APPOINTMENT (OUTPATIENT)
Dept: RADIOLOGY | Facility: MEDICAL CENTER | Age: 60
End: 2020-01-12
Attending: EMERGENCY MEDICINE
Payer: COMMERCIAL

## 2020-01-12 ENCOUNTER — HOSPITAL ENCOUNTER (INPATIENT)
Facility: MEDICAL CENTER | Age: 60
LOS: 2 days | DRG: 566 | End: 2020-01-14
Attending: EMERGENCY MEDICINE | Admitting: SURGERY
Payer: COMMERCIAL

## 2020-01-12 ENCOUNTER — APPOINTMENT (OUTPATIENT)
Dept: RADIOLOGY | Facility: MEDICAL CENTER | Age: 60
DRG: 566 | End: 2020-01-12
Attending: SURGERY
Payer: COMMERCIAL

## 2020-01-12 VITALS
TEMPERATURE: 98.6 F | SYSTOLIC BLOOD PRESSURE: 125 MMHG | RESPIRATION RATE: 20 BRPM | DIASTOLIC BLOOD PRESSURE: 69 MMHG | OXYGEN SATURATION: 96 % | WEIGHT: 137 LBS | BODY MASS INDEX: 21.46 KG/M2 | HEART RATE: 77 BPM

## 2020-01-12 DIAGNOSIS — S20.219A CONTUSION OF CHEST WALL, UNSPECIFIED LATERALITY, INITIAL ENCOUNTER: ICD-10-CM

## 2020-01-12 DIAGNOSIS — S22.22XA CLOSED FRACTURE OF BODY OF STERNUM, INITIAL ENCOUNTER: ICD-10-CM
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
## 2020-01-12 DIAGNOSIS — S22.20XA CLOSED FRACTURE OF STERNUM, UNSPECIFIED PORTION OF STERNUM, INITIAL ENCOUNTER: ICD-10-CM

## 2020-01-12 PROBLEM — T14.90XA TRAUMA: Status: ACTIVE | Noted: 2020-01-12

## 2020-01-12 LAB
ALBUMIN SERPL BCP-MCNC: 5.1 G/DL (ref 3.2–4.9)
ALBUMIN/GLOB SERPL: 2 G/DL
ALP SERPL-CCNC: 106 U/L (ref 30–99)
ALT SERPL-CCNC: 67 U/L (ref 2–50)
ANION GAP SERPL CALC-SCNC: 20 MMOL/L (ref 0–11.9)
AST SERPL-CCNC: 96 U/L (ref 12–45)
BASOPHILS # BLD AUTO: 0.4 % (ref 0–1.8)
BASOPHILS # BLD: 0.09 K/UL (ref 0–0.12)
BILIRUB SERPL-MCNC: 0.7 MG/DL (ref 0.1–1.5)
BUN SERPL-MCNC: 22 MG/DL (ref 8–22)
CALCIUM SERPL-MCNC: 9.6 MG/DL (ref 8.4–10.2)
CHLORIDE SERPL-SCNC: 101 MMOL/L (ref 96–112)
CO2 SERPL-SCNC: 20 MMOL/L (ref 20–33)
CREAT SERPL-MCNC: 0.67 MG/DL (ref 0.5–1.4)
EOSINOPHIL # BLD AUTO: 0.02 K/UL (ref 0–0.51)
EOSINOPHIL NFR BLD: 0.1 % (ref 0–6.9)
ERYTHROCYTE [DISTWIDTH] IN BLOOD BY AUTOMATED COUNT: 42.7 FL (ref 35.9–50)
GLOBULIN SER CALC-MCNC: 2.5 G/DL (ref 1.9–3.5)
GLUCOSE SERPL-MCNC: 122 MG/DL (ref 65–99)
HCT VFR BLD AUTO: 47 % (ref 37–47)
HGB BLD-MCNC: 15 G/DL (ref 12–16)
IMM GRANULOCYTES # BLD AUTO: 0.22 K/UL (ref 0–0.11)
IMM GRANULOCYTES NFR BLD AUTO: 1.1 % (ref 0–0.9)
LYMPHOCYTES # BLD AUTO: 0.68 K/UL (ref 1–4.8)
LYMPHOCYTES NFR BLD: 3.3 % (ref 22–41)
MCH RBC QN AUTO: 28.3 PG (ref 27–33)
MCHC RBC AUTO-ENTMCNC: 31.9 G/DL (ref 33.6–35)
MCV RBC AUTO: 88.7 FL (ref 81.4–97.8)
MONOCYTES # BLD AUTO: 0.87 K/UL (ref 0–0.85)
MONOCYTES NFR BLD AUTO: 4.2 % (ref 0–13.4)
NEUTROPHILS # BLD AUTO: 18.82 K/UL (ref 2–7.15)
NEUTROPHILS NFR BLD: 90.9 % (ref 44–72)
NRBC # BLD AUTO: 0 K/UL
NRBC BLD-RTO: 0 /100 WBC
PLATELET # BLD AUTO: 173 K/UL (ref 164–446)
PMV BLD AUTO: 11.7 FL (ref 9–12.9)
POTASSIUM SERPL-SCNC: 4.2 MMOL/L (ref 3.6–5.5)
PROT SERPL-MCNC: 7.6 G/DL (ref 6–8.2)
RBC # BLD AUTO: 5.3 M/UL (ref 4.2–5.4)
SODIUM SERPL-SCNC: 141 MMOL/L (ref 135–145)
WBC # BLD AUTO: 20.7 K/UL (ref 4.8–10.8)

## 2020-01-12 PROCEDURE — 85025 COMPLETE CBC W/AUTO DIFF WBC: CPT

## 2020-01-12 PROCEDURE — 700102 HCHG RX REV CODE 250 W/ 637 OVERRIDE(OP): Performed by: SURGERY

## 2020-01-12 PROCEDURE — A9270 NON-COVERED ITEM OR SERVICE: HCPCS | Performed by: SURGERY

## 2020-01-12 PROCEDURE — 700117 HCHG RX CONTRAST REV CODE 255: Performed by: EMERGENCY MEDICINE

## 2020-01-12 PROCEDURE — 96374 THER/PROPH/DIAG INJ IV PUSH: CPT

## 2020-01-12 PROCEDURE — 96375 TX/PRO/DX INJ NEW DRUG ADDON: CPT

## 2020-01-12 PROCEDURE — 700111 HCHG RX REV CODE 636 W/ 250 OVERRIDE (IP): Performed by: EMERGENCY MEDICINE

## 2020-01-12 PROCEDURE — 700105 HCHG RX REV CODE 258: Performed by: SURGERY

## 2020-01-12 PROCEDURE — 74177 CT ABD & PELVIS W/CONTRAST: CPT

## 2020-01-12 PROCEDURE — 72125 CT NECK SPINE W/O DYE: CPT

## 2020-01-12 PROCEDURE — 700111 HCHG RX REV CODE 636 W/ 250 OVERRIDE (IP): Performed by: SURGERY

## 2020-01-12 PROCEDURE — 770001 HCHG ROOM/CARE - MED/SURG/GYN PRIV*

## 2020-01-12 PROCEDURE — 99285 EMERGENCY DEPT VISIT HI MDM: CPT

## 2020-01-12 PROCEDURE — 80053 COMPREHEN METABOLIC PANEL: CPT

## 2020-01-12 PROCEDURE — 770020 HCHG ROOM/CARE - TELE (206)

## 2020-01-12 RX ORDER — HYDROMORPHONE HYDROCHLORIDE 1 MG/ML
0.5 INJECTION, SOLUTION INTRAMUSCULAR; INTRAVENOUS; SUBCUTANEOUS ONCE
Status: COMPLETED | OUTPATIENT
Start: 2020-01-12 | End: 2020-01-12

## 2020-01-12 RX ORDER — DOCUSATE SODIUM 100 MG/1
100 CAPSULE, LIQUID FILLED ORAL 2 TIMES DAILY
Status: DISCONTINUED | OUTPATIENT
Start: 2020-01-12 | End: 2020-01-14 | Stop reason: HOSPADM

## 2020-01-12 RX ORDER — LEVOTHYROXINE SODIUM 0.05 MG/1
50 TABLET ORAL
Status: SHIPPED | COMMUNITY
End: 2020-12-04

## 2020-01-12 RX ORDER — OXYCODONE HYDROCHLORIDE 10 MG/1
10 TABLET ORAL
Status: DISCONTINUED | OUTPATIENT
Start: 2020-01-12 | End: 2020-01-14 | Stop reason: HOSPADM

## 2020-01-12 RX ORDER — POLYETHYLENE GLYCOL 3350 17 G/17G
1 POWDER, FOR SOLUTION ORAL 2 TIMES DAILY
Status: DISCONTINUED | OUTPATIENT
Start: 2020-01-12 | End: 2020-01-14 | Stop reason: HOSPADM

## 2020-01-12 RX ORDER — ONDANSETRON 2 MG/ML
4 INJECTION INTRAMUSCULAR; INTRAVENOUS ONCE
Status: COMPLETED | OUTPATIENT
Start: 2020-01-12 | End: 2020-01-12

## 2020-01-12 RX ORDER — AMOXICILLIN 250 MG
1 CAPSULE ORAL
Status: DISCONTINUED | OUTPATIENT
Start: 2020-01-12 | End: 2020-01-14 | Stop reason: HOSPADM

## 2020-01-12 RX ORDER — ONDANSETRON 2 MG/ML
4 INJECTION INTRAMUSCULAR; INTRAVENOUS EVERY 4 HOURS PRN
Status: DISCONTINUED | OUTPATIENT
Start: 2020-01-12 | End: 2020-01-14 | Stop reason: HOSPADM

## 2020-01-12 RX ORDER — KETOROLAC TROMETHAMINE 30 MG/ML
30 INJECTION, SOLUTION INTRAMUSCULAR; INTRAVENOUS EVERY 6 HOURS
Status: DISCONTINUED | OUTPATIENT
Start: 2020-01-13 | End: 2020-01-14 | Stop reason: HOSPADM

## 2020-01-12 RX ORDER — ACETAMINOPHEN 500 MG
1000 TABLET ORAL EVERY 6 HOURS
Status: DISCONTINUED | OUTPATIENT
Start: 2020-01-13 | End: 2020-01-14 | Stop reason: HOSPADM

## 2020-01-12 RX ORDER — SODIUM CHLORIDE, SODIUM LACTATE, POTASSIUM CHLORIDE, CALCIUM CHLORIDE 600; 310; 30; 20 MG/100ML; MG/100ML; MG/100ML; MG/100ML
INJECTION, SOLUTION INTRAVENOUS CONTINUOUS
Status: DISCONTINUED | OUTPATIENT
Start: 2020-01-12 | End: 2020-01-13

## 2020-01-12 RX ORDER — BISACODYL 10 MG
10 SUPPOSITORY, RECTAL RECTAL
Status: DISCONTINUED | OUTPATIENT
Start: 2020-01-12 | End: 2020-01-14 | Stop reason: HOSPADM

## 2020-01-12 RX ORDER — AMOXICILLIN 250 MG
1 CAPSULE ORAL NIGHTLY
Status: DISCONTINUED | OUTPATIENT
Start: 2020-01-12 | End: 2020-01-14 | Stop reason: HOSPADM

## 2020-01-12 RX ORDER — MORPHINE SULFATE 4 MG/ML
4 INJECTION, SOLUTION INTRAMUSCULAR; INTRAVENOUS
Status: DISCONTINUED | OUTPATIENT
Start: 2020-01-12 | End: 2020-01-14 | Stop reason: HOSPADM

## 2020-01-12 RX ORDER — ENEMA 19; 7 G/133ML; G/133ML
1 ENEMA RECTAL
Status: DISCONTINUED | OUTPATIENT
Start: 2020-01-12 | End: 2020-01-14 | Stop reason: HOSPADM

## 2020-01-12 RX ORDER — OXYCODONE HYDROCHLORIDE 5 MG/1
5 TABLET ORAL
Status: DISCONTINUED | OUTPATIENT
Start: 2020-01-12 | End: 2020-01-14 | Stop reason: HOSPADM

## 2020-01-12 RX ADMIN — ACETAMINOPHEN 1000 MG: 500 TABLET, FILM COATED ORAL at 23:38

## 2020-01-12 RX ADMIN — HYDROMORPHONE HYDROCHLORIDE 0.5 MG: 1 INJECTION, SOLUTION INTRAMUSCULAR; INTRAVENOUS; SUBCUTANEOUS at 17:02

## 2020-01-12 RX ADMIN — OXYCODONE HYDROCHLORIDE 10 MG: 10 TABLET ORAL at 22:05

## 2020-01-12 RX ADMIN — KETOROLAC TROMETHAMINE 30 MG: 30 INJECTION, SOLUTION INTRAMUSCULAR at 23:38

## 2020-01-12 RX ADMIN — SODIUM CHLORIDE, POTASSIUM CHLORIDE, SODIUM LACTATE AND CALCIUM CHLORIDE: 600; 310; 30; 20 INJECTION, SOLUTION INTRAVENOUS at 23:46

## 2020-01-12 RX ADMIN — HYDROMORPHONE HYDROCHLORIDE 0.5 MG: 1 INJECTION, SOLUTION INTRAMUSCULAR; INTRAVENOUS; SUBCUTANEOUS at 20:24

## 2020-01-12 RX ADMIN — ONDANSETRON HYDROCHLORIDE 4 MG: 2 SOLUTION INTRAMUSCULAR; INTRAVENOUS at 17:01

## 2020-01-12 ASSESSMENT — COPD QUESTIONNAIRES
DO YOU EVER COUGH UP ANY MUCUS OR PHLEGM?: NO/ONLY WITH OCCASIONAL COLDS OR INFECTIONS
DURING THE PAST 4 WEEKS HOW MUCH DID YOU FEEL SHORT OF BREATH: NONE/LITTLE OF THE TIME
HAVE YOU SMOKED AT LEAST 100 CIGARETTES IN YOUR ENTIRE LIFE: NO/DON'T KNOW
COPD SCREENING SCORE: 2

## 2020-01-12 ASSESSMENT — LIFESTYLE VARIABLES
EVER_SMOKED: NEVER
EVER_SMOKED: NEVER
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
## 2020-01-13 ENCOUNTER — APPOINTMENT (OUTPATIENT)
Dept: CARDIOLOGY | Facility: MEDICAL CENTER | Age: 60
DRG: 566 | End: 2020-01-13
Attending: SURGERY
Payer: COMMERCIAL

## 2020-01-13 ENCOUNTER — APPOINTMENT (OUTPATIENT)
Dept: RADIOLOGY | Facility: MEDICAL CENTER | Age: 60
DRG: 566 | End: 2020-01-13
Attending: SURGERY
Payer: COMMERCIAL

## 2020-01-13 LAB
LV EJECT FRACT  99904: 65
LV EJECT FRACT MOD 2C 99903: 68.84
LV EJECT FRACT MOD 4C 99902: 62.34
LV EJECT FRACT MOD BP 99901: 64.71

## 2020-01-13 PROCEDURE — A9270 NON-COVERED ITEM OR SERVICE: HCPCS | Performed by: SURGERY

## 2020-01-13 PROCEDURE — 700111 HCHG RX REV CODE 636 W/ 250 OVERRIDE (IP): Performed by: SURGERY

## 2020-01-13 PROCEDURE — 93306 TTE W/DOPPLER COMPLETE: CPT

## 2020-01-13 PROCEDURE — 700102 HCHG RX REV CODE 250 W/ 637 OVERRIDE(OP): Performed by: NURSE PRACTITIONER

## 2020-01-13 PROCEDURE — A9270 NON-COVERED ITEM OR SERVICE: HCPCS | Performed by: NURSE PRACTITIONER

## 2020-01-13 PROCEDURE — 93306 TTE W/DOPPLER COMPLETE: CPT | Mod: 26 | Performed by: INTERNAL MEDICINE

## 2020-01-13 PROCEDURE — 700102 HCHG RX REV CODE 250 W/ 637 OVERRIDE(OP): Performed by: SURGERY

## 2020-01-13 PROCEDURE — 770020 HCHG ROOM/CARE - TELE (206)

## 2020-01-13 PROCEDURE — 71045 X-RAY EXAM CHEST 1 VIEW: CPT

## 2020-01-13 PROCEDURE — 700112 HCHG RX REV CODE 229: Performed by: SURGERY

## 2020-01-13 RX ORDER — LEVOTHYROXINE SODIUM 0.05 MG/1
50 TABLET ORAL
Status: DISCONTINUED | OUTPATIENT
Start: 2020-01-13 | End: 2020-01-14 | Stop reason: HOSPADM

## 2020-01-13 RX ORDER — ROSUVASTATIN CALCIUM 20 MG/1
20 TABLET, COATED ORAL EVERY EVENING
Status: DISCONTINUED | OUTPATIENT
Start: 2020-01-13 | End: 2020-01-14 | Stop reason: HOSPADM

## 2020-01-13 RX ORDER — GABAPENTIN 100 MG/1
100 CAPSULE ORAL 3 TIMES DAILY
Status: DISCONTINUED | OUTPATIENT
Start: 2020-01-13 | End: 2020-01-14 | Stop reason: HOSPADM

## 2020-01-13 RX ORDER — DIPHENHYDRAMINE HCL 25 MG
25 TABLET ORAL EVERY 6 HOURS PRN
Status: DISCONTINUED | OUTPATIENT
Start: 2020-01-13 | End: 2020-01-14 | Stop reason: HOSPADM

## 2020-01-13 RX ADMIN — ROSUVASTATIN CALCIUM 20 MG: 20 TABLET, FILM COATED ORAL at 17:52

## 2020-01-13 RX ADMIN — ACETAMINOPHEN 1000 MG: 500 TABLET, FILM COATED ORAL at 17:44

## 2020-01-13 RX ADMIN — DIPHENHYDRAMINE HYDROCHLORIDE 25 MG: 25 TABLET ORAL at 05:48

## 2020-01-13 RX ADMIN — ACETAMINOPHEN 1000 MG: 500 TABLET, FILM COATED ORAL at 23:52

## 2020-01-13 RX ADMIN — KETOROLAC TROMETHAMINE 30 MG: 30 INJECTION, SOLUTION INTRAMUSCULAR at 17:45

## 2020-01-13 RX ADMIN — OXYCODONE HYDROCHLORIDE 10 MG: 10 TABLET ORAL at 01:37

## 2020-01-13 RX ADMIN — KETOROLAC TROMETHAMINE 30 MG: 30 INJECTION, SOLUTION INTRAMUSCULAR at 23:52

## 2020-01-13 RX ADMIN — OXYCODONE HYDROCHLORIDE 10 MG: 10 TABLET ORAL at 08:32

## 2020-01-13 RX ADMIN — DIPHENHYDRAMINE HYDROCHLORIDE 25 MG: 25 TABLET ORAL at 22:35

## 2020-01-13 RX ADMIN — GABAPENTIN 100 MG: 100 CAPSULE ORAL at 17:44

## 2020-01-13 RX ADMIN — ACETAMINOPHEN 1000 MG: 500 TABLET, FILM COATED ORAL at 05:48

## 2020-01-13 RX ADMIN — DOCUSATE SODIUM 100 MG: 100 CAPSULE, LIQUID FILLED ORAL at 05:48

## 2020-01-13 RX ADMIN — LEVOTHYROXINE SODIUM 50 MCG: 50 TABLET ORAL at 07:27

## 2020-01-13 RX ADMIN — KETOROLAC TROMETHAMINE 30 MG: 30 INJECTION, SOLUTION INTRAMUSCULAR at 05:48

## 2020-01-13 RX ADMIN — ACETAMINOPHEN 1000 MG: 500 TABLET, FILM COATED ORAL at 12:16

## 2020-01-13 RX ADMIN — GABAPENTIN 100 MG: 100 CAPSULE ORAL at 12:16

## 2020-01-13 RX ADMIN — KETOROLAC TROMETHAMINE 30 MG: 30 INJECTION, SOLUTION INTRAMUSCULAR at 12:16

## 2020-01-13 RX ADMIN — DOCUSATE SODIUM 100 MG: 100 CAPSULE, LIQUID FILLED ORAL at 17:44

## 2020-01-13 RX ADMIN — OXYCODONE HYDROCHLORIDE 5 MG: 5 TABLET ORAL at 20:52

## 2020-01-13 ASSESSMENT — ENCOUNTER SYMPTOMS
ROS GI COMMENTS: LAST BOWEL MOVEMENT PTA
VOMITING: 0
FEVER: 0
NEUROLOGICAL NEGATIVE: 1
NAUSEA: 0
ABDOMINAL PAIN: 0
PSYCHIATRIC NEGATIVE: 1
MYALGIAS: 1

## 2020-01-13 ASSESSMENT — LIFESTYLE VARIABLES
EVER FELT BAD OR GUILTY ABOUT YOUR DRINKING: NO
ALCOHOL_USE: NO
DOES PATIENT WANT TO STOP DRINKING: NO
TOTAL SCORE: 0
ON A TYPICAL DAY WHEN YOU DRINK ALCOHOL HOW MANY DRINKS DO YOU HAVE: 0
HOW MANY TIMES IN THE PAST YEAR HAVE YOU HAD 5 OR MORE DRINKS IN A DAY: 0
HAVE YOU EVER FELT YOU SHOULD CUT DOWN ON YOUR DRINKING: NO
HAVE PEOPLE ANNOYED YOU BY CRITICIZING YOUR DRINKING: NO
EVER HAD A DRINK FIRST THING IN THE MORNING TO STEADY YOUR NERVES TO GET RID OF A HANGOVER: NO
EVER_SMOKED: NEVER
CONSUMPTION TOTAL: NEGATIVE
AVERAGE NUMBER OF DAYS PER WEEK YOU HAVE A DRINK CONTAINING ALCOHOL: 0

## 2020-01-13 ASSESSMENT — COGNITIVE AND FUNCTIONAL STATUS - GENERAL
CLIMB 3 TO 5 STEPS WITH RAILING: A LITTLE
SUGGESTED CMS G CODE MODIFIER MOBILITY: CK
MOVING TO AND FROM BED TO CHAIR: A LITTLE
HELP NEEDED FOR BATHING: A LITTLE
MOBILITY SCORE: 18
DRESSING REGULAR UPPER BODY CLOTHING: A LITTLE
DRESSING REGULAR LOWER BODY CLOTHING: A LITTLE
MOVING FROM LYING ON BACK TO SITTING ON SIDE OF FLAT BED: A LITTLE
WALKING IN HOSPITAL ROOM: A LITTLE
STANDING UP FROM CHAIR USING ARMS: A LITTLE
TURNING FROM BACK TO SIDE WHILE IN FLAT BAD: A LITTLE
DAILY ACTIVITIY SCORE: 21
SUGGESTED CMS G CODE MODIFIER DAILY ACTIVITY: CJ

## 2020-01-13 ASSESSMENT — COPD QUESTIONNAIRES
DURING THE PAST 4 WEEKS HOW MUCH DID YOU FEEL SHORT OF BREATH: NONE/LITTLE OF THE TIME
DO YOU EVER COUGH UP ANY MUCUS OR PHLEGM?: NO/ONLY WITH OCCASIONAL COLDS OR INFECTIONS
IN THE PAST 12 MONTHS DO YOU DO LESS THAN YOU USED TO BECAUSE OF YOUR BREATHING PROBLEMS: DISAGREE/UNSURE
COPD SCREENING SCORE: 2
HAVE YOU SMOKED AT LEAST 100 CIGARETTES IN YOUR ENTIRE LIFE: NO/DON'T KNOW

## 2020-01-13 ASSESSMENT — PATIENT HEALTH QUESTIONNAIRE - PHQ9
1. LITTLE INTEREST OR PLEASURE IN DOING THINGS: NOT AT ALL
2. FEELING DOWN, DEPRESSED, IRRITABLE, OR HOPELESS: NOT AT ALL
SUM OF ALL RESPONSES TO PHQ9 QUESTIONS 1 AND 2: 0
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
## 2020-01-13 NOTE — ASSESSMENT & PLAN NOTE
Systemic anticoagulation contraindicated secondary to elevated bleeding risk.  Consider surveillance venous duplex scanning if unable to initiate chemical DVT prophylaxis within 48 hrs of admission.

## 2020-01-13 NOTE — ED NOTES
Patient resting in bed. Patient occasionally self adjusts in bed. Bed is at lowest position and locked. Call light and preferred belongings in reach. Patient denies any needs. Pain 4/10 and tolerable. Will continue to monitor. Updated on status/plan of care.
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
## 2020-01-13 NOTE — PROGRESS NOTES
Assumed care at 0700    Received report from NOC shift RN.    Reviewed recent lab results, notes, orders, and MAR  POC discussed and updated on care board  Bed is in the lowest and locked position, call light within reach      A&Ox4  RA  Medicated for pain   Bruising noted on chest, shoulder, waist  Stand by assist  Tolerating diet  +voiding  +flatus  Patient is on remote tele-monitor.
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
## 2020-01-13 NOTE — CARE PLAN
Problem: Communication  Goal: The ability to communicate needs accurately and effectively will improve  Outcome: PROGRESSING AS EXPECTED     Problem: Safety  Goal: Will remain free from injury  Outcome: PROGRESSING AS EXPECTED     Problem: Infection  Goal: Will remain free from infection  Outcome: PROGRESSING AS EXPECTED     Problem: Bowel/Gastric:  Goal: Normal bowel function is maintained or improved  Outcome: PROGRESSING AS EXPECTED     Problem: Pain Management  Goal: Pain level will decrease to patient's comfort goal  Outcome: PROGRESSING AS EXPECTED
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
## 2020-01-13 NOTE — PROGRESS NOTES
"TRAUMA TERTIARY SURVEY     Mental status adequate for full examination?: Yes    Spine cleared (radiologically and/or clinically): Yes    PHYSICAL EXAMINATION:  Vitals: BP (!) 92/58   Pulse 70   Temp 36.9 °C (98.4 °F) (Temporal)   Resp 18   Ht 1.702 m (5' 7\")   Wt 62.1 kg (137 lb)   LMP 08/28/2002   SpO2 99%   BMI 21.46 kg/m²   Constitutional:     General Appearance: appears stated age, is in no apparent distress, is well developed and well nourished.  HEENT:     No significant external craniofacial trauma. The pupils are equal, round, and reactive to light bilaterally. The extraocular muscles are intact bilaterally. The nares and oropharynx are clear. The midface and jaw are stable. No malocclusion is evident.  Neck:    No posterior midline cervical-spine tenderness, no evidence of intoxication, normal level of alertness ([place] Coma Scale 15), no focal neurologic deficit, and no painful distracting injuries.  Respiratory:   Inspection: Unlabored respirations, no intercostal retractions, paradoxical motion, or accessory muscle use.   Palpation:  The chest is tender - sternum. The clavicles are non deformed bilaterally..   Auscultation: normal, clear to auscultation.  Cardiovascular:   Auscultation: normal and regular rate and rhythm.   Peripheral Pulses: Normal.   Abdomen:   Abdomen is soft, nontender, without organomegaly or masses.  Genitourinary:   (FEMALE): normal female external genitalia without lesions.  Musculoskeletal:   The pelvis is stable.  No significant angulation, deformity, or soft tissue injury involving the upper and lower extremities. Normal range of motion.   Back:   The thoracolumbar spine was examined. Examination is remarkable for no significant tenderness, swelling, or deformity in the thoracolumbar region.  Skin:   The skin is warm and dry.  Neurologic:    [place] Coma Scale (GCS) 15 [id number]. Neurologic examination revealed no focal deficits noted.  Psychiatric:   The patient does " not appear depressed or anxious.    IMAGING:  DX-CHEST-PORTABLE (1 VIEW)   Final Result         1.  No acute cardiopulmonary disease.   2.  Atherosclerosis      EC-ECHOCARDIOGRAM COMPLETE W/ CONT    (Results Pending)     All current laboratory studies/radiology exams reviewed: Yes    Completed Consultations:       Pending Consultations:      Newly Identified Diagnoses and Injuries:  none    TOTAL RAP SCORE:  SAMANTHA Score    ETOH Screening  CAGE Score: 0  Assessment complete date: 1/13/2020
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
## 2020-01-13 NOTE — DISCHARGE PLANNING
Care Transition Team Assessment    Information Source  Orientation : Oriented x 4  Information Given By: Patient  Informant's Name: (Evelyn)  Who is responsible for making decisions for patient? : Patient    Readmission Evaluation  Is this a readmission?: No    Elopement Risk  Legal Hold: No  Ambulatory or Self Mobile in Wheelchair: Yes  Disoriented: No  Psychiatric Symptoms: None  History of Wandering: No  Elopement this Admit: No  Vocalizing Wanting to Leave: No  Displays Behaviors, Body Language Wanting to Leave: No-Not at Risk for Elopement  Elopement Risk: Not at Risk for Elopement    Interdisciplinary Discharge Planning  Patient or legal guardian wants to designate a caregiver (see row info): No    Discharge Preparedness  What is your plan after discharge?: Home with help  What are your discharge supports?: Spouse  Prior Functional Level: Independent with Activities of Daily Living    Functional Assesment  Prior Functional Level: Independent with Activities of Daily Living         Vision / Hearing Impairment  Vision Impairment : Yes  Right Eye Vision: Impaired, Wears Glasses  Left Eye Vision: Impaired, Wears Glasses  Hearing Impairment : No         Advance Directive  Advance Directive?: None    Domestic Abuse  Have you ever been the victim of abuse or violence?: No  Physical Abuse or Sexual Abuse: No  Verbal Abuse or Emotional Abuse: No  Possible Abuse Reported to:: Not Applicable    Psychological Assessment  History of Substance Abuse: None  History of Psychiatric Problems: No    Discharge Risks or Barriers  Discharge risks or barriers?: No    Anticipated Discharge Information  Anticipated discharge disposition: Home

## 2020-01-13 NOTE — ED NOTES
While rounding on patient, she reports her pain is now 9/10. Patient call light is laying across her, pt is instructed on call light use and encouraged to use it if her pain is increasing. Patient verbalized understanding.     Patient resting in bed. Patient occasionally self adjusts in bed. Bed is at lowest position and locked.

## 2020-01-14 ENCOUNTER — APPOINTMENT (OUTPATIENT)
Dept: RADIOLOGY | Facility: MEDICAL CENTER | Age: 60
DRG: 566 | End: 2020-01-14
Attending: SURGERY
Payer: COMMERCIAL

## 2020-01-14 VITALS
RESPIRATION RATE: 17 BRPM | OXYGEN SATURATION: 96 % | WEIGHT: 137 LBS | SYSTOLIC BLOOD PRESSURE: 111 MMHG | BODY MASS INDEX: 21.5 KG/M2 | TEMPERATURE: 97.1 F | DIASTOLIC BLOOD PRESSURE: 65 MMHG | HEART RATE: 71 BPM | HEIGHT: 67 IN

## 2020-01-14 PROBLEM — Z53.09 CONTRAINDICATION TO DEEP VEIN THROMBOSIS (DVT) PROPHYLAXIS: Status: RESOLVED | Noted: 2020-01-12 | Resolved: 2020-01-14

## 2020-01-14 LAB
ALBUMIN SERPL BCP-MCNC: 3.7 G/DL (ref 3.2–4.9)
ALBUMIN/GLOB SERPL: 1.9 G/DL
ALP SERPL-CCNC: 70 U/L (ref 30–99)
ALT SERPL-CCNC: 29 U/L (ref 2–50)
ANION GAP SERPL CALC-SCNC: 8 MMOL/L (ref 0–11.9)
AST SERPL-CCNC: 23 U/L (ref 12–45)
BASOPHILS # BLD AUTO: 0.6 % (ref 0–1.8)
BASOPHILS # BLD: 0.04 K/UL (ref 0–0.12)
BILIRUB SERPL-MCNC: 0.9 MG/DL (ref 0.1–1.5)
BUN SERPL-MCNC: 16 MG/DL (ref 8–22)
CALCIUM SERPL-MCNC: 8.4 MG/DL (ref 8.5–10.5)
CHLORIDE SERPL-SCNC: 102 MMOL/L (ref 96–112)
CO2 SERPL-SCNC: 26 MMOL/L (ref 20–33)
CREAT SERPL-MCNC: 0.66 MG/DL (ref 0.5–1.4)
EOSINOPHIL # BLD AUTO: 0.15 K/UL (ref 0–0.51)
EOSINOPHIL NFR BLD: 2.2 % (ref 0–6.9)
ERYTHROCYTE [DISTWIDTH] IN BLOOD BY AUTOMATED COUNT: 47.5 FL (ref 35.9–50)
GLOBULIN SER CALC-MCNC: 2 G/DL (ref 1.9–3.5)
GLUCOSE SERPL-MCNC: 90 MG/DL (ref 65–99)
HCT VFR BLD AUTO: 42.5 % (ref 37–47)
HGB BLD-MCNC: 13.2 G/DL (ref 12–16)
IMM GRANULOCYTES # BLD AUTO: 0.02 K/UL (ref 0–0.11)
IMM GRANULOCYTES NFR BLD AUTO: 0.3 % (ref 0–0.9)
LYMPHOCYTES # BLD AUTO: 1.2 K/UL (ref 1–4.8)
LYMPHOCYTES NFR BLD: 17.6 % (ref 22–41)
MCH RBC QN AUTO: 28.8 PG (ref 27–33)
MCHC RBC AUTO-ENTMCNC: 31.1 G/DL (ref 33.6–35)
MCV RBC AUTO: 92.8 FL (ref 81.4–97.8)
MONOCYTES # BLD AUTO: 0.55 K/UL (ref 0–0.85)
MONOCYTES NFR BLD AUTO: 8.1 % (ref 0–13.4)
NEUTROPHILS # BLD AUTO: 4.84 K/UL (ref 2–7.15)
NEUTROPHILS NFR BLD: 71.2 % (ref 44–72)
NRBC # BLD AUTO: 0 K/UL
NRBC BLD-RTO: 0 /100 WBC
PLATELET # BLD AUTO: 135 K/UL (ref 164–446)
PMV BLD AUTO: 11.2 FL (ref 9–12.9)
POTASSIUM SERPL-SCNC: 3.9 MMOL/L (ref 3.6–5.5)
PROT SERPL-MCNC: 5.7 G/DL (ref 6–8.2)
RBC # BLD AUTO: 4.58 M/UL (ref 4.2–5.4)
SODIUM SERPL-SCNC: 136 MMOL/L (ref 135–145)
WBC # BLD AUTO: 6.8 K/UL (ref 4.8–10.8)

## 2020-01-14 PROCEDURE — 700102 HCHG RX REV CODE 250 W/ 637 OVERRIDE(OP): Performed by: NURSE PRACTITIONER

## 2020-01-14 PROCEDURE — 71045 X-RAY EXAM CHEST 1 VIEW: CPT

## 2020-01-14 PROCEDURE — 700102 HCHG RX REV CODE 250 W/ 637 OVERRIDE(OP): Performed by: SURGERY

## 2020-01-14 PROCEDURE — A9270 NON-COVERED ITEM OR SERVICE: HCPCS | Performed by: NURSE PRACTITIONER

## 2020-01-14 PROCEDURE — 700111 HCHG RX REV CODE 636 W/ 250 OVERRIDE (IP): Performed by: SURGERY

## 2020-01-14 PROCEDURE — A9270 NON-COVERED ITEM OR SERVICE: HCPCS | Performed by: SURGERY

## 2020-01-14 PROCEDURE — 36415 COLL VENOUS BLD VENIPUNCTURE: CPT

## 2020-01-14 PROCEDURE — 80053 COMPREHEN METABOLIC PANEL: CPT

## 2020-01-14 PROCEDURE — 85025 COMPLETE CBC W/AUTO DIFF WBC: CPT

## 2020-01-14 RX ORDER — GABAPENTIN 100 MG/1
100 CAPSULE ORAL 3 TIMES DAILY
Qty: 63 CAP | Refills: 0 | Status: SHIPPED | OUTPATIENT
Start: 2020-01-14 | End: 2020-02-04

## 2020-01-14 RX ORDER — OXYCODONE HYDROCHLORIDE 5 MG/1
5 TABLET ORAL
Qty: 28 TAB | Refills: 0 | Status: SHIPPED | OUTPATIENT
Start: 2020-01-14 | End: 2020-01-21

## 2020-01-14 RX ORDER — ACETAMINOPHEN 500 MG
1000 TABLET ORAL EVERY 6 HOURS
Qty: 30 TAB | Refills: 0 | COMMUNITY
Start: 2020-01-14

## 2020-01-14 RX ORDER — CELECOXIB 100 MG/1
100 CAPSULE ORAL 2 TIMES DAILY PRN
Qty: 42 CAP | Refills: 0 | Status: SHIPPED | OUTPATIENT
Start: 2020-01-14 | End: 2020-02-04

## 2020-01-14 RX ORDER — METHOCARBAMOL 750 MG/1
750 TABLET, FILM COATED ORAL 3 TIMES DAILY PRN
Qty: 50 TAB | Refills: 0 | Status: SHIPPED | OUTPATIENT
Start: 2020-01-14 | End: 2020-02-04

## 2020-01-14 RX ADMIN — ACETAMINOPHEN 1000 MG: 500 TABLET, FILM COATED ORAL at 05:40

## 2020-01-14 RX ADMIN — KETOROLAC TROMETHAMINE 30 MG: 30 INJECTION, SOLUTION INTRAMUSCULAR at 05:40

## 2020-01-14 RX ADMIN — LEVOTHYROXINE SODIUM 50 MCG: 50 TABLET ORAL at 05:40

## 2020-01-14 RX ADMIN — GABAPENTIN 100 MG: 100 CAPSULE ORAL at 05:40

## 2020-01-14 RX ADMIN — OXYCODONE HYDROCHLORIDE 5 MG: 5 TABLET ORAL at 11:15

## 2020-01-14 RX ADMIN — KETOROLAC TROMETHAMINE 30 MG: 30 INJECTION, SOLUTION INTRAMUSCULAR at 11:09

## 2020-01-14 RX ADMIN — GABAPENTIN 100 MG: 100 CAPSULE ORAL at 11:08

## 2020-01-14 NOTE — DISCHARGE INSTRUCTIONS
Discharge Instructions    Discharged to home by car with relative. Discharged via wheelchair, hospital escort: Yes.  Special equipment needed: Not Applicable    Be sure to schedule a follow-up appointment with your primary care doctor or any specialists as instructed.     Discharge Plan:   Diet Plan: Discussed  Activity Level: Discussed  Confirmed Follow up Appointment: Patient to Call and Schedule Appointment  Confirmed Symptoms Management: Discussed  Medication Reconciliation Updated: Yes  Influenza Vaccine Indication: Not indicated: Previously immunized this influenza season and > 8 years of age    I understand that a diet low in cholesterol, fat, and sodium is recommended for good health. Unless I have been given specific instructions below for another diet, I accept this instruction as my diet prescription.   Other diet: as tolerated    Special Instructions: None    · Is patient discharged on Warfarin / Coumadin?   No       Sternal Fracture  A sternal fracture is a break in the bone in the center of your chest (sternum or breastbone). This fracture is not dangerous unless there is also an injury to your heart or lungs, which are protected by the sternum and ribs.  What are the causes?  This condition is usually caused by a forceful injury from:  · Motor vehicle collisions. This is the most common cause.  · Contact sports.  · Physical assaults.  You can also have a sternal fracture without having a forceful injury if the bone becomes weakened over time (stress fracture or insufficiency fracture).  What increases the risk?  You may be at greater risk for a sternal fracture if you:  · Participate in direct contact sports, such as football or wrestling.  · Work at elevated heights, such as in construction.  Other risk factors for a stress or insufficiency sternal fracture include:  · Being female.  · Being a postmenopausal woman.  · Being age 50 or older.  · Having osteoporosis.  · Having severe curvature of the  spine.  · Being on long-term steroid treatment.  What are the signs or symptoms?  Symptoms of this condition include:  · Pain over the sternum.  · Pain when pressing on the sternum.  · Pain that gets worse with deep breathing or coughing.  · Shortness of breath.  · Bruising.  · Swelling.  · A crackling sound when taking a deep breath or pressing on the sternum.  How is this diagnosed?  This condition is diagnosed with a medical history and physical exam. You may also have imaging tests, including:  · CT scan.  · Ultrasound.  · Chest X-rays that are taken from a side view.  Your health care provider may check your blood oxygen level with a pulse oximetry test. You may also have repeated electrocardiograms (ECGs) to make sure that your heart has not been injured. You may also have a blood test to check for damage to your heart muscle.  How is this treated?  Treatment depends on the severity of your injury. A sternal fracture without any other injury (isolated sternal fracture) usually heals without treatment. You may need to limit (restrict) some activities at home and take medicine for pain relief.  In rare cases, you may need surgery to repair a sternal fracture that continues to cause severe pain or a sternal fracture that involves bones that have been moved out of position considerably (displaced fracture).  Follow these instructions at home:  · Take over-the-counter and prescription medicines only as told by your health care provider.  · Rest at home. Return to your normal activities as told by your health care provider. Ask your health care provider what activities are safe for you.  · If directed, apply ice to the injured area:  ¨ Put ice in a plastic bag.  ¨ Place a towel between your skin and the bag.  ¨ Leave the ice on for 20 minutes, 2-3 times a day.  · Do not lift anything that is heavier than 10 lb (4.5 kg) until your health care provider says it is safe.  · Do not drive or operate heavy machinery while  taking prescription pain medicine.  · Do not use any tobacco products, such as cigarettes, chewing tobacco, and e-cigarettes. If you need help quitting, ask your health care provider.  · Keep all follow-up visits as told by your health care provider. This is important.  Contact a health care provider if:  · Your pain medicine is not helping.  · You continue to have pain after several weeks.  · You develop a fever.  · You develop a cough and you have thick or bloody mucus (sputum).  Get help right away if:  · You have difficulty breathing.  · You have chest pain.  · You have an abnormal heartbeat (palpitations).  · You feel nauseous or you have pain in your abdomen.  This information is not intended to replace advice given to you by your health care provider. Make sure you discuss any questions you have with your health care provider.  Document Released: 08/01/2005 Document Revised: 08/15/2017 Document Reviewed: 07/13/2016  [employer] Interactive Patient Education © 2017 [employer]      Depression / Suicide Risk    As you are discharged from this [facility] facility, it is important to learn how to keep safe from harming yourself.    Recognize the warning signs:  · Abrupt changes in personality, positive or negative- including increase in energy   · Giving away possessions  · Change in eating patterns- significant weight changes-  positive or negative  · Change in sleeping patterns- unable to sleep or sleeping all the time   · Unwillingness or inability to communicate  · Depression  · Unusual sadness, discouragement and loneliness  · Talk of wanting to die  · Neglect of personal appearance   · Rebelliousness- reckless behavior  · Withdrawal from people/activities they love  · Confusion- inability to concentrate     If you or a loved one observes any of these behaviors or has concerns about self-harm, here's what you can do:  · Talk about it- your feelings and reasons for harming yourself  · Remove any means that  you might use to hurt yourself (examples: pills, rope, extension cords, firearm)  · Get professional help from the community (Mental Health, Substance Abuse, psychological counseling)  · Do not be alone:Call your Safe Contact- someone whom you trust who will be there for you.  · Call your local CRISIS HOTLINE 510-5617 or 250-207-4407  · Call your local Children's Mobile Crisis Response Team [place] (838) 943-1253 or www.[employer]  · Call the toll free National Suicide Prevention Hotlines   · National Suicide Prevention Lifeline 578-554-QKTM (0006)  · National Hope Line Network 800-SUICIDE (354-8521)

## 2020-01-14 NOTE — PROGRESS NOTES
Patient discharged to home with son and staff escort. IV discontinued. Prescriptions given to patient, verbalized understanding, consent signed and in chart. Discharge instructions given regarding medications, follow up, and what to look for.  Education provided, patient asked questions and verbalized understanding. Discharge paperwork signed and in chart. Patient is tolerating diet, stable when ambulating, and pain is well controlled. All belongings collected. No further questions or concerns at this time.

## 2020-01-14 NOTE — CARE PLAN
Problem: Communication  Goal: The ability to communicate needs accurately and effectively will improve  Outcome: PROGRESSING AS EXPECTED     Problem: Safety  Goal: Will remain free from injury  Outcome: PROGRESSING AS EXPECTED     Problem: Bowel/Gastric:  Goal: Normal bowel function is maintained or improved  Outcome: PROGRESSING AS EXPECTED     Problem: Knowledge Deficit  Goal: Knowledge of disease process/condition, treatment plan, diagnostic tests, and medications will improve  Outcome: PROGRESSING AS EXPECTED     Problem: Pain Management  Goal: Pain level will decrease to patient's comfort goal  Outcome: PROGRESSING AS EXPECTED     Problem: Respiratory:  Goal: Respiratory status will improve  Outcome: PROGRESSING AS EXPECTED

## 2020-01-14 NOTE — PROGRESS NOTES
Bedside report received.  Assessment complete.  A&O x 4. Patient calls appropriately.  Patient up with no assist.   Patient has 0/10 pain. Declines PRN at this time  Denies N&V. Tolerating regular diet. TELE monitor in place  + void, + flatus, + BM.  Patient denies SOB.  SCD's on.  Patient in pleasant mood, DC today once knows what time ride will arrive.  Review plan with of care with patient. Call light and personal belongings with in reach. Hourly rounding in place. All needs met at this time.
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
## 2020-01-14 NOTE — CARE PLAN
Problem: Discharge Barriers/Planning  Goal: Patient's continuum of care needs will be met  Outcome: PROGRESSING AS EXPECTED  Plan to DC today once ride is arranged     Problem: Pain Management  Goal: Pain level will decrease to patient's comfort goal  Outcome: MET   No pain at this time
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
## 2020-01-29 ENCOUNTER — HOSPITAL ENCOUNTER (OUTPATIENT)
Dept: RADIOLOGY | Facility: MEDICAL CENTER | Age: 60
End: 2020-01-29
Attending: SURGERY
Payer: COMMERCIAL

## 2020-01-29 DIAGNOSIS — S22.20XA CLOSED FRACTURE OF STERNUM, UNSPECIFIED PORTION OF STERNUM, INITIAL ENCOUNTER: ICD-10-CM

## 2020-01-29 PROCEDURE — 71046 X-RAY EXAM CHEST 2 VIEWS: CPT

## 2020-01-31 ENCOUNTER — TELEPHONE (OUTPATIENT)
Dept: MEDICAL GROUP | Facility: LAB | Age: 60
End: 2020-01-31
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
## 2020-02-06 ENCOUNTER — OFFICE VISIT (OUTPATIENT)
Dept: MEDICAL GROUP | Facility: LAB | Age: 60
End: 2020-02-06
Payer: COMMERCIAL

## 2020-02-06 VITALS
BODY MASS INDEX: 19.65 KG/M2 | SYSTOLIC BLOOD PRESSURE: 104 MMHG | DIASTOLIC BLOOD PRESSURE: 58 MMHG | OXYGEN SATURATION: 97 % | WEIGHT: 125.2 LBS | HEART RATE: 60 BPM | HEIGHT: 67 IN | TEMPERATURE: 97.8 F | RESPIRATION RATE: 14 BRPM

## 2020-02-06 DIAGNOSIS — J06.9 UPPER RESPIRATORY TRACT INFECTION, UNSPECIFIED TYPE: ICD-10-CM

## 2020-02-06 DIAGNOSIS — S49.91XD INJURY OF RIGHT SHOULDER, SUBSEQUENT ENCOUNTER: ICD-10-CM

## 2020-02-06 DIAGNOSIS — S22.22XD CLOSED FRACTURE OF BODY OF STERNUM WITH ROUTINE HEALING, SUBSEQUENT ENCOUNTER: ICD-10-CM

## 2020-02-06 PROCEDURE — 99214 OFFICE O/P EST MOD 30 MIN: CPT | Performed by: INTERNAL MEDICINE

## 2020-02-06 RX ORDER — AMOXICILLIN AND CLAVULANATE POTASSIUM 875; 125 MG/1; MG/1
1 TABLET, FILM COATED ORAL 2 TIMES DAILY
Qty: 14 TAB | Refills: 0 | Status: SHIPPED | OUTPATIENT
Start: 2020-02-06 | End: 2020-11-20

## 2020-02-06 RX ORDER — IBUPROFEN 800 MG/1
800 TABLET ORAL EVERY 8 HOURS PRN
Qty: 60 TAB | Refills: 3 | Status: SHIPPED | OUTPATIENT
Start: 2020-02-06 | End: 2020-09-25
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
## 2020-02-25 ENCOUNTER — HOSPITAL ENCOUNTER (OUTPATIENT)
Dept: RADIOLOGY | Facility: MEDICAL CENTER | Age: 60
End: 2020-02-25
Attending: INTERNAL MEDICINE
Payer: COMMERCIAL

## 2020-02-25 ENCOUNTER — HOSPITAL ENCOUNTER (OUTPATIENT)
Dept: LAB | Facility: MEDICAL CENTER | Age: 60
End: 2020-02-25
Attending: INTERNAL MEDICINE
Payer: COMMERCIAL

## 2020-02-25 DIAGNOSIS — J06.9 UPPER RESPIRATORY TRACT INFECTION, UNSPECIFIED TYPE: ICD-10-CM

## 2020-02-25 LAB
BASOPHILS # BLD AUTO: 0.7 % (ref 0–1.8)
BASOPHILS # BLD: 0.05 K/UL (ref 0–0.12)
EOSINOPHIL # BLD AUTO: 0.19 K/UL (ref 0–0.51)
EOSINOPHIL NFR BLD: 2.7 % (ref 0–6.9)
ERYTHROCYTE [DISTWIDTH] IN BLOOD BY AUTOMATED COUNT: 46.8 FL (ref 35.9–50)
HCT VFR BLD AUTO: 47.5 % (ref 37–47)
HGB BLD-MCNC: 14.7 G/DL (ref 12–16)
IMM GRANULOCYTES # BLD AUTO: 0.03 K/UL (ref 0–0.11)
IMM GRANULOCYTES NFR BLD AUTO: 0.4 % (ref 0–0.9)
LYMPHOCYTES # BLD AUTO: 0.9 K/UL (ref 1–4.8)
LYMPHOCYTES NFR BLD: 12.9 % (ref 22–41)
MCH RBC QN AUTO: 29 PG (ref 27–33)
MCHC RBC AUTO-ENTMCNC: 30.9 G/DL (ref 33.6–35)
MCV RBC AUTO: 93.7 FL (ref 81.4–97.8)
MONOCYTES # BLD AUTO: 0.63 K/UL (ref 0–0.85)
MONOCYTES NFR BLD AUTO: 9 % (ref 0–13.4)
NEUTROPHILS # BLD AUTO: 5.19 K/UL (ref 2–7.15)
NEUTROPHILS NFR BLD: 74.3 % (ref 44–72)
NRBC # BLD AUTO: 0 K/UL
NRBC BLD-RTO: 0 /100 WBC
PLATELET # BLD AUTO: 168 K/UL (ref 164–446)
PMV BLD AUTO: 11.6 FL (ref 9–12.9)
RBC # BLD AUTO: 5.07 M/UL (ref 4.2–5.4)
WBC # BLD AUTO: 7 K/UL (ref 4.8–10.8)

## 2020-02-25 PROCEDURE — 85025 COMPLETE CBC W/AUTO DIFF WBC: CPT

## 2020-02-25 PROCEDURE — 71046 X-RAY EXAM CHEST 2 VIEWS: CPT

## 2020-02-25 PROCEDURE — 36415 COLL VENOUS BLD VENIPUNCTURE: CPT

## 2020-02-27 ENCOUNTER — OFFICE VISIT (OUTPATIENT)
Dept: MEDICAL GROUP | Facility: LAB | Age: 60
End: 2020-02-27
Payer: COMMERCIAL

## 2020-02-27 VITALS
RESPIRATION RATE: 12 BRPM | DIASTOLIC BLOOD PRESSURE: 62 MMHG | TEMPERATURE: 98.7 F | HEART RATE: 78 BPM | BODY MASS INDEX: 19.81 KG/M2 | HEIGHT: 67 IN | SYSTOLIC BLOOD PRESSURE: 130 MMHG | WEIGHT: 126.2 LBS | OXYGEN SATURATION: 98 %

## 2020-02-27 DIAGNOSIS — S22.22XD CLOSED FRACTURE OF BODY OF STERNUM WITH ROUTINE HEALING, SUBSEQUENT ENCOUNTER: ICD-10-CM

## 2020-02-27 DIAGNOSIS — J06.9 UPPER RESPIRATORY TRACT INFECTION, UNSPECIFIED TYPE: ICD-10-CM

## 2020-02-27 PROCEDURE — 99213 OFFICE O/P EST LOW 20 MIN: CPT | Performed by: INTERNAL MEDICINE

## 2020-02-27 ASSESSMENT — PATIENT HEALTH QUESTIONNAIRE - PHQ9: CLINICAL INTERPRETATION OF PHQ2 SCORE: 0
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
## 2020-03-12 ENCOUNTER — OFFICE VISIT (OUTPATIENT)
Dept: MEDICAL GROUP | Facility: LAB | Age: 60
End: 2020-03-12
Payer: COMMERCIAL

## 2020-03-12 VITALS
DIASTOLIC BLOOD PRESSURE: 65 MMHG | RESPIRATION RATE: 12 BRPM | OXYGEN SATURATION: 94 % | HEIGHT: 67 IN | WEIGHT: 124.2 LBS | HEART RATE: 74 BPM | SYSTOLIC BLOOD PRESSURE: 115 MMHG | BODY MASS INDEX: 19.49 KG/M2 | TEMPERATURE: 98.6 F

## 2020-03-12 DIAGNOSIS — R91.1 PULMONARY NODULE: ICD-10-CM

## 2020-03-12 DIAGNOSIS — S22.22XD CLOSED FRACTURE OF BODY OF STERNUM WITH ROUTINE HEALING, SUBSEQUENT ENCOUNTER: ICD-10-CM

## 2020-03-12 DIAGNOSIS — E78.5 DYSLIPIDEMIA: ICD-10-CM

## 2020-03-12 PROCEDURE — 99214 OFFICE O/P EST MOD 30 MIN: CPT | Performed by: INTERNAL MEDICINE

## 2020-03-12 RX ORDER — ROSUVASTATIN CALCIUM 20 MG/1
20 TABLET, COATED ORAL EVERY EVENING
Qty: 90 TAB | Refills: 3 | Status: SHIPPED | OUTPATIENT
Start: 2020-03-12 | End: 2021-01-28 | Stop reason: SDUPTHER

## 2020-03-12 ASSESSMENT — FIBROSIS 4 INDEX: FIB4 SCORE: 1.53
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
## 2020-03-30 ENCOUNTER — TELEPHONE (OUTPATIENT)
Dept: MEDICAL GROUP | Facility: LAB | Age: 60
End: 2020-03-30

## 2020-03-30 DIAGNOSIS — H00.013 HORDEOLUM EXTERNUM OF RIGHT EYE, UNSPECIFIED EYELID: ICD-10-CM

## 2020-03-30 RX ORDER — TOBRAMYCIN 3 MG/ML
1 SOLUTION/ DROPS OPHTHALMIC EVERY 4 HOURS
Qty: 1 BOTTLE | Refills: 0 | Status: SHIPPED
Start: 2020-03-30 | End: 2020-12-10

## 2020-03-30 NOTE — TELEPHONE ENCOUNTER
Maryellen:  Please call Griselda Nur, tobramycin eyedrops not ointment was sent to her pharmacy!  Have her also use a warm compress to her right eye approximately no more than 15 minutes an hour while awake.   Regards, Segundo Crawford, DO

## 2020-03-30 NOTE — TELEPHONE ENCOUNTER
1. Caller Name: Griselda                         Call Back Number: [phone] (home) [phone] (work)       How would the patient prefer to be contacted with a response: Phone call OK to leave a detailed message    Pt has a stye in her right eye.  She is asking for an ointment.  Please advise.

## 2020-04-07 ENCOUNTER — TELEPHONE (OUTPATIENT)
Dept: MEDICAL GROUP | Facility: LAB | Age: 60
End: 2020-04-07

## 2020-04-07 DIAGNOSIS — R89.9 ABNORMAL LABORATORY TEST: ICD-10-CM

## 2020-04-07 NOTE — TELEPHONE ENCOUNTER
1. Caller Name:Evelyn Meeks       Call Back Number: [phone] (home) [phone] (work)      How would the patient prefer to be contacted with a response:     Pt needs a lb for BUN for imaging. Please place the order so she can get scheduled.

## 2020-04-07 NOTE — TELEPHONE ENCOUNTER
Trena:  Please call Griselda Nur, basic metabolic panel has been ordered for her in the [facility] lab system.  Regards, Segundo Crawford, DO

## 2020-04-09 ENCOUNTER — HOSPITAL ENCOUNTER (OUTPATIENT)
Dept: LAB | Facility: MEDICAL CENTER | Age: 60
End: 2020-04-09
Attending: INTERNAL MEDICINE
Payer: COMMERCIAL

## 2020-04-09 DIAGNOSIS — R89.9 ABNORMAL LABORATORY TEST: ICD-10-CM

## 2020-04-09 DIAGNOSIS — E78.5 DYSLIPIDEMIA: ICD-10-CM

## 2020-04-09 LAB
ANION GAP SERPL CALC-SCNC: 14 MMOL/L (ref 7–16)
BUN SERPL-MCNC: 24 MG/DL (ref 8–22)
CALCIUM SERPL-MCNC: 8.7 MG/DL (ref 8.5–10.5)
CHLORIDE SERPL-SCNC: 103 MMOL/L (ref 96–112)
CHOLEST SERPL-MCNC: 183 MG/DL (ref 100–199)
CO2 SERPL-SCNC: 23 MMOL/L (ref 20–33)
CREAT SERPL-MCNC: 0.64 MG/DL (ref 0.5–1.4)
GLUCOSE SERPL-MCNC: 96 MG/DL (ref 65–99)
HDLC SERPL-MCNC: 77 MG/DL
LDLC SERPL CALC-MCNC: 93 MG/DL
POTASSIUM SERPL-SCNC: 4.5 MMOL/L (ref 3.6–5.5)
SODIUM SERPL-SCNC: 140 MMOL/L (ref 135–145)
TRIGL SERPL-MCNC: 63 MG/DL (ref 0–149)

## 2020-04-09 PROCEDURE — 80048 BASIC METABOLIC PNL TOTAL CA: CPT

## 2020-04-09 PROCEDURE — 36415 COLL VENOUS BLD VENIPUNCTURE: CPT

## 2020-04-09 PROCEDURE — 80061 LIPID PANEL: CPT

## 2020-04-24 ENCOUNTER — HOSPITAL ENCOUNTER (OUTPATIENT)
Dept: RADIOLOGY | Facility: MEDICAL CENTER | Age: 60
End: 2020-04-24
Attending: INTERNAL MEDICINE
Payer: COMMERCIAL

## 2020-04-24 DIAGNOSIS — R91.1 PULMONARY NODULE: ICD-10-CM

## 2020-04-24 PROCEDURE — 700117 HCHG RX CONTRAST REV CODE 255: Performed by: INTERNAL MEDICINE

## 2020-04-24 PROCEDURE — 71260 CT THORAX DX C+: CPT

## 2020-04-24 RX ADMIN — IOHEXOL 75 ML: 350 INJECTION, SOLUTION INTRAVENOUS at 16:00

## 2020-04-30 ENCOUNTER — OFFICE VISIT (OUTPATIENT)
Dept: MEDICAL GROUP | Facility: LAB | Age: 60
End: 2020-04-30
Payer: COMMERCIAL

## 2020-04-30 VITALS — WEIGHT: 137 LBS | BODY MASS INDEX: 21.46 KG/M2

## 2020-04-30 DIAGNOSIS — S22.20XA CLOSED FRACTURE OF STERNUM, UNSPECIFIED PORTION OF STERNUM, INITIAL ENCOUNTER: ICD-10-CM

## 2020-04-30 DIAGNOSIS — R93.1 ABNORMAL SCREENING CARDIAC CT: ICD-10-CM

## 2020-04-30 DIAGNOSIS — E78.5 DYSLIPIDEMIA: ICD-10-CM

## 2020-04-30 PROCEDURE — 99441 PR PHYSICIAN TELEPHONE EVALUATION 5-10 MIN: CPT | Mod: CR | Performed by: INTERNAL MEDICINE

## 2020-04-30 ASSESSMENT — FIBROSIS 4 INDEX: FIB4 SCORE: 1.53
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
## 2020-05-01 ENCOUNTER — TELEPHONE (OUTPATIENT)
Dept: MEDICAL GROUP | Facility: LAB | Age: 60
End: 2020-05-01

## 2020-05-01 NOTE — TELEPHONE ENCOUNTER
----- Message from Segundo Crawford D.O. sent at 4/30/2020  3:32 PM PDT -----  Trena:  Please schedule for 6 months.   Regards, Segundo Crawford, DO

## 2020-07-21 ENCOUNTER — EH NON-PROVIDER (OUTPATIENT)
Dept: OCCUPATIONAL MEDICINE | Facility: CLINIC | Age: 60
End: 2020-07-21

## 2020-07-21 DIAGNOSIS — Z02.89 ENCOUNTER FOR OCCUPATIONAL HEALTH EXAMINATION: ICD-10-CM

## 2020-07-21 PROCEDURE — 94375 RESPIRATORY FLOW VOLUME LOOP: CPT | Performed by: NURSE PRACTITIONER

## 2020-07-28 ENCOUNTER — OFFICE VISIT (OUTPATIENT)
Dept: MEDICAL GROUP | Facility: LAB | Age: 60
End: 2020-07-28
Payer: COMMERCIAL

## 2020-07-28 VITALS
OXYGEN SATURATION: 95 % | WEIGHT: 128 LBS | BODY MASS INDEX: 20.09 KG/M2 | HEIGHT: 67 IN | RESPIRATION RATE: 12 BRPM | DIASTOLIC BLOOD PRESSURE: 58 MMHG | HEART RATE: 75 BPM | TEMPERATURE: 98.4 F | SYSTOLIC BLOOD PRESSURE: 108 MMHG

## 2020-07-28 DIAGNOSIS — S90.561A INSECT BITE OF RIGHT ANKLE, INITIAL ENCOUNTER: ICD-10-CM

## 2020-07-28 DIAGNOSIS — F41.9 ANXIETY: ICD-10-CM
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
## 2020-07-28 DIAGNOSIS — Z12.39 SCREENING FOR BREAST CANCER: ICD-10-CM

## 2020-07-28 PROCEDURE — 99214 OFFICE O/P EST MOD 30 MIN: CPT | Performed by: INTERNAL MEDICINE

## 2020-07-28 ASSESSMENT — FIBROSIS 4 INDEX: FIB4 SCORE: 1.53

## 2020-07-28 NOTE — PROGRESS NOTES
CC: Evelyn Meeks is a 59 y.o. female is suffering from No chief complaint on file.        SUBJECTIVE:  1. Screening for breast cancer  Patient is here for follow-up as need of screening mammogram which was ordered    2. Insect bite of right ankle, initial encounter  Patient with insect bite medial aspect of the right ankle this appears to be healing well.    3. Anxiety  Anxiety regarding caring for her grandchildren x4 because of her daughter's problems with alcohol abuse.        Past social, family, history:   Social History     Tobacco Use   • Smoking status: Never Smoker   • Smokeless tobacco: Never Used   Substance Use Topics   • Alcohol use: No     Alcohol/week: 0.0 oz   • Drug use: No         MEDICATIONS:    Current Outpatient Medications:   •  tobramycin (TOBREX) 0.3 % Solution ophthalmic solution, Place 1 Drop in right eye every 4 hours., Disp: 1 Bottle, Rfl: 0  •  rosuvastatin (CRESTOR) 20 MG Tab, Take 1 Tab by mouth every evening., Disp: 90 Tab, Rfl: 3  •  amoxicillin-clavulanate (AUGMENTIN) 875-125 MG Tab, Take 1 Tab by mouth 2 times a day. (Patient not taking: Reported on 2/27/2020), Disp: 14 Tab, Rfl: 0  •  ibuprofen (MOTRIN) 800 MG Tab, Take 1 Tab by mouth every 8 hours as needed., Disp: 60 Tab, Rfl: 3  •  acetaminophen (TYLENOL) 500 MG Tab, Take 2 Tabs by mouth every 6 hours., Disp: 30 Tab, Rfl: 0  •  levothyroxine (SYNTHROID) 50 MCG Tab, Take 50 mcg by mouth Every morning on an empty stomach., Disp: , Rfl:   •  Cholecalciferol (VITAMIN D-3) 1000 UNITS Cap, Take 1,000 Units by mouth every day., Disp: , Rfl:     PROBLEMS:  Patient Active Problem List    Diagnosis Date Noted   • Closed fracture of sternum 01/12/2020     Priority: High   • Trauma 01/12/2020     Priority: Low   • Other specified hypothyroidism 07/19/2016     Priority: Low   • Dyslipidemia 08/19/2011     Priority: Low   • Elevated coronary artery calcium score: Approximately 720 18 07/06/2018   • Abnormal screening  "cardiac CT 06/20/2018   • Carpal tunnel syndrome on right 01/20/2017   • Thoracic back pain 12/12/2016   • Closed fracture of distal end of right radius 08/10/2016   • Osteoporosis 01/15/2016   • Grief at loss of child 08/04/2015   • ADD (attention deficit disorder) 03/26/2013   • Gastroesophageal reflux disease 09/07/2012   • Anal fissure 10/06/2011       REVIEW OF SYSTEMS:  Gen.:  No Nausea, Vomiting, fever, Chills.  Heart: No chest pain.  Lungs:  No shortness of Breath.  Psychological: Geo unusual Anxiety depression     PHYSICAL EXAM   Constitutional: Alert, cooperative, not in acute distress.  Cardiovascular:  Rate Rhythm is regular without murmurs rubs clicks.     Thorax & Lungs: Clear to auscultation, no wheezing, rhonchi, or rales  HENT: Normocephalic, Atraumatic.  Eyes: PERRLA, EOMI, Conjunctiva normal.   Neck: Trachia is midline no swelling of the thyroid.   Neurologic: Alert & oriented x 3, cranial nerves II through XII are intact, Normal motor function, Normal sensory function, No focal deficits noted.   Psychiatric: Affect normal, Judgment normal, Mood normal.     VITAL SIGNS:/58 (BP Location: Right arm, Patient Position: Sitting, BP Cuff Size: Adult)   Pulse 75   Temp 36.9 °C (98.4 °F)   Resp 12   Ht 1.702 m (5' 7\")   Wt 58.1 kg (128 lb)   LMP 08/28/2002   SpO2 95%   BMI 20.05 kg/m²     Labs: Reviewed    Assessment:                                                     Plan:    1. Screening for breast cancer  Screening mammogram ordered  - MA-SCREENING MAMMO BILAT W/TOMOSYNTHESIS W/CAD; Future    2. Insect bite of right ankle, initial encounter  Insect bite medial right ankle appears to be healing    3. Anxiety  Anxiety regarding alcohol abuse on the part of daughter-in-law        117 70    "

## 2020-09-04 ENCOUNTER — HOSPITAL ENCOUNTER (OUTPATIENT)
Dept: RADIOLOGY | Facility: MEDICAL CENTER | Age: 60
End: 2020-09-04
Attending: INTERNAL MEDICINE
Payer: COMMERCIAL

## 2020-09-04 DIAGNOSIS — Z12.39 SCREENING FOR BREAST CANCER: ICD-10-CM

## 2020-09-04 PROCEDURE — 77067 SCR MAMMO BI INCL CAD: CPT

## 2020-09-11 ENCOUNTER — HOSPITAL ENCOUNTER (OUTPATIENT)
Dept: LAB | Facility: MEDICAL CENTER | Age: 60
End: 2020-09-11
Payer: COMMERCIAL

## 2020-09-11 LAB
BDY FAT % MEASURED: 28.6 %
BP DIAS: 66 MMHG
BP SYS: 112 MMHG
CHOLEST SERPL-MCNC: 177 MG/DL (ref 100–199)
DIABETES HTDIA: NO
EVENT NAME HTEVT: NORMAL
FASTING HTFAS: YES
GLUCOSE SERPL-MCNC: 103 MG/DL (ref 65–99)
HDLC SERPL-MCNC: 79 MG/DL
HYPERTENSION HTHYP: NO
LDLC SERPL CALC-MCNC: 85 MG/DL
SCREENING LOC CITY HTCIT: NORMAL
SCREENING LOC STATE HTSTA: NORMAL
SCREENING LOCATION HTLOC: NORMAL
SMOKING HTSMO: NO
SUBSCRIBER ID HTSID: NORMAL
TRIGL SERPL-MCNC: 64 MG/DL (ref 0–149)

## 2020-09-11 PROCEDURE — S5190 WELLNESS ASSESSMENT BY NONPH: HCPCS

## 2020-09-11 PROCEDURE — 82947 ASSAY GLUCOSE BLOOD QUANT: CPT

## 2020-09-11 PROCEDURE — 80061 LIPID PANEL: CPT

## 2020-09-11 PROCEDURE — 36415 COLL VENOUS BLD VENIPUNCTURE: CPT

## 2020-09-25 DIAGNOSIS — S22.22XD CLOSED FRACTURE OF BODY OF STERNUM WITH ROUTINE HEALING, SUBSEQUENT ENCOUNTER: ICD-10-CM

## 2020-09-25 DIAGNOSIS — S49.91XD INJURY OF RIGHT SHOULDER, SUBSEQUENT ENCOUNTER: ICD-10-CM

## 2020-09-25 RX ORDER — IBUPROFEN 800 MG/1
TABLET ORAL
Qty: 60 TAB | Refills: 2 | Status: SHIPPED | OUTPATIENT
Start: 2020-09-25 | End: 2021-01-28 | Stop reason: SDUPTHER

## 2020-09-25 NOTE — TELEPHONE ENCOUNTER
Received request via: Pharmacy    Was the patient seen in the last year in this department? Yes  7/28/20  Does the patient have an active prescription (recently filled or refills available) for medication(s) requested? No   activity

## 2020-10-02 ENCOUNTER — IMMUNIZATION (OUTPATIENT)
Dept: OCCUPATIONAL MEDICINE | Facility: CLINIC | Age: 60
End: 2020-10-02

## 2020-10-02 DIAGNOSIS — Z23 NEED FOR VACCINATION: ICD-10-CM

## 2020-10-02 PROCEDURE — 90686 IIV4 VACC NO PRSV 0.5 ML IM: CPT | Performed by: PREVENTIVE MEDICINE

## 2020-11-20 ENCOUNTER — HOSPITAL ENCOUNTER (OUTPATIENT)
Facility: MEDICAL CENTER | Age: 60
End: 2020-11-20
Attending: FAMILY MEDICINE
Payer: COMMERCIAL

## 2020-11-20 ENCOUNTER — OFFICE VISIT (OUTPATIENT)
Dept: MEDICAL GROUP | Facility: LAB | Age: 60
End: 2020-11-20
Payer: COMMERCIAL

## 2020-11-20 VITALS
DIASTOLIC BLOOD PRESSURE: 64 MMHG | TEMPERATURE: 99.1 F | HEIGHT: 67 IN | HEART RATE: 80 BPM | OXYGEN SATURATION: 96 % | WEIGHT: 129 LBS | SYSTOLIC BLOOD PRESSURE: 100 MMHG | RESPIRATION RATE: 12 BRPM | BODY MASS INDEX: 20.25 KG/M2

## 2020-11-20 DIAGNOSIS — N30.01 ACUTE CYSTITIS WITH HEMATURIA: ICD-10-CM

## 2020-11-20 LAB
APPEARANCE UR: NORMAL
BILIRUB UR STRIP-MCNC: NEGATIVE MG/DL
COLOR UR AUTO: YELLOW
GLUCOSE UR STRIP.AUTO-MCNC: NEGATIVE MG/DL
KETONES UR STRIP.AUTO-MCNC: NEGATIVE MG/DL
LEUKOCYTE ESTERASE UR QL STRIP.AUTO: NORMAL
NITRITE UR QL STRIP.AUTO: NEGATIVE
PH UR STRIP.AUTO: 6 [PH] (ref 5–8)
PROT UR QL STRIP: NEGATIVE MG/DL
RBC UR QL AUTO: NORMAL
SP GR UR STRIP.AUTO: >=1.03
UROBILINOGEN UR STRIP-MCNC: 1 MG/DL

## 2020-11-20 PROCEDURE — 87077 CULTURE AEROBIC IDENTIFY: CPT

## 2020-11-20 PROCEDURE — 87186 SC STD MICRODIL/AGAR DIL: CPT

## 2020-11-20 PROCEDURE — 99213 OFFICE O/P EST LOW 20 MIN: CPT | Performed by: FAMILY MEDICINE

## 2020-11-20 PROCEDURE — 87086 URINE CULTURE/COLONY COUNT: CPT

## 2020-11-20 PROCEDURE — 81002 URINALYSIS NONAUTO W/O SCOPE: CPT | Performed by: FAMILY MEDICINE

## 2020-11-20 RX ORDER — SULFAMETHOXAZOLE AND TRIMETHOPRIM 800; 160 MG/1; MG/1
1 TABLET ORAL 2 TIMES DAILY
Qty: 6 TAB | Refills: 0 | Status: SHIPPED | OUTPATIENT
Start: 2020-11-20 | End: 2020-11-23

## 2020-11-20 ASSESSMENT — FIBROSIS 4 INDEX: FIB4 SCORE: 1.55

## 2020-11-20 NOTE — PROGRESS NOTES
"Subjective:     CC: UTI    HPI:   Evelyn presents today with concern for UTI.    She reports dysuria and frequency for the last 2 to 3 days.  No flank pain, no fevers, no nausea or vomiting.  No abdominal pain.  History of UTIs in the past but none for some time.  No recent antibiotics or hospitalizations.  No history of antibiotic resistant UTIs.    Care UA with small LE and trace blood    Medications, past medical history, allergies, and social history have been reviewed and updated.    ROS:  See HPI      Objective:       Exam:  /64 (BP Location: Left arm, Patient Position: Sitting, BP Cuff Size: Adult)   Pulse 80   Temp 37.3 °C (99.1 °F)   Resp 12   Ht 1.702 m (5' 7\")   Wt 58.5 kg (129 lb)   LMP 08/28/2002   SpO2 96%   BMI 20.20 kg/m²  Body mass index is 20.2 kg/m².    Constitutional: Alert. Well appearing. No distress.  Skin: Warm, dry, good turgor, no visible rashes.  Eye: Equal, round and reactive to light, conjunctiva clear, lids normal.  ENMT: Moist mucous membranes. Normal dentition.  Respiratory: Normal effort.  Abd: Soft, nontender, nondistended.  No CVA tenderness.  Neuro: Moves all four extremities. No facial droop.  Psych: Answers questions appropriately. Normal affect and mood.      Assessment & Plan:     60 y.o. female with the following -     1. Acute cystitis with hematuria  New issue.  She reports 2 to 3 days of dysuria and urinary frequency.  For night care UA with small LE and trace blood.  Bactrim as below and sending for culture.  Discussed reasons to seek care.  - POCT Urinalysis  - URINE CULTURE(NEW); Future  - sulfamethoxazole-trimethoprim (BACTRIM DS) 800-160 MG tablet; Take 1 Tab by mouth 2 times a day for 3 days.  Dispense: 6 Tab; Refill: 0      Please note that this note was created using voice recognition software.    "

## 2020-11-21 DIAGNOSIS — N30.01 ACUTE CYSTITIS WITH HEMATURIA: ICD-10-CM

## 2020-11-23 LAB
BACTERIA UR CULT: ABNORMAL
BACTERIA UR CULT: ABNORMAL
SIGNIFICANT IND 70042: ABNORMAL
SITE SITE: ABNORMAL
SOURCE SOURCE: ABNORMAL

## 2020-11-25 ENCOUNTER — OFFICE VISIT (OUTPATIENT)
Dept: URGENT CARE | Facility: CLINIC | Age: 60
End: 2020-11-25
Payer: COMMERCIAL

## 2020-11-25 ENCOUNTER — HOSPITAL ENCOUNTER (OUTPATIENT)
Facility: MEDICAL CENTER | Age: 60
End: 2020-11-25
Attending: NURSE PRACTITIONER
Payer: COMMERCIAL

## 2020-11-25 VITALS
TEMPERATURE: 97.1 F | RESPIRATION RATE: 12 BRPM | WEIGHT: 127.4 LBS | SYSTOLIC BLOOD PRESSURE: 106 MMHG | HEART RATE: 78 BPM | DIASTOLIC BLOOD PRESSURE: 66 MMHG | HEIGHT: 67 IN | OXYGEN SATURATION: 96 % | BODY MASS INDEX: 20 KG/M2

## 2020-11-25 DIAGNOSIS — N30.00 ACUTE CYSTITIS WITHOUT HEMATURIA: ICD-10-CM

## 2020-11-25 DIAGNOSIS — N89.8 VAGINAL DISCHARGE: ICD-10-CM

## 2020-11-25 DIAGNOSIS — B37.31 CANDIDAL VULVOVAGINITIS: ICD-10-CM

## 2020-11-25 LAB
APPEARANCE UR: NORMAL
BILIRUB UR STRIP-MCNC: NEGATIVE MG/DL
COLOR UR AUTO: NORMAL
GLUCOSE UR STRIP.AUTO-MCNC: NEGATIVE MG/DL
KETONES UR STRIP.AUTO-MCNC: NEGATIVE MG/DL
LEUKOCYTE ESTERASE UR QL STRIP.AUTO: NEGATIVE
NITRITE UR QL STRIP.AUTO: NEGATIVE
PH UR STRIP.AUTO: 5.5 [PH] (ref 5–8)
PROT UR QL STRIP: NEGATIVE MG/DL
RBC UR QL AUTO: NORMAL
SP GR UR STRIP.AUTO: >=1.03
UROBILINOGEN UR STRIP-MCNC: 0.2 MG/DL

## 2020-11-25 PROCEDURE — 87660 TRICHOMONAS VAGIN DIR PROBE: CPT

## 2020-11-25 PROCEDURE — 99214 OFFICE O/P EST MOD 30 MIN: CPT | Performed by: NURSE PRACTITIONER

## 2020-11-25 PROCEDURE — 81002 URINALYSIS NONAUTO W/O SCOPE: CPT | Performed by: NURSE PRACTITIONER

## 2020-11-25 PROCEDURE — 87480 CANDIDA DNA DIR PROBE: CPT

## 2020-11-25 PROCEDURE — 87510 GARDNER VAG DNA DIR PROBE: CPT

## 2020-11-25 RX ORDER — FLUCONAZOLE 150 MG/1
150 TABLET ORAL DAILY
Qty: 2 TAB | Refills: 0 | Status: SHIPPED
Start: 2020-11-25 | End: 2020-12-10

## 2020-11-25 RX ORDER — CEFDINIR 300 MG/1
300 CAPSULE ORAL EVERY 12 HOURS
Qty: 10 CAP | Refills: 0 | Status: SHIPPED | OUTPATIENT
Start: 2020-11-25 | End: 2020-11-30

## 2020-11-25 ASSESSMENT — FIBROSIS 4 INDEX: FIB4 SCORE: 1.55

## 2020-11-25 ASSESSMENT — ENCOUNTER SYMPTOMS
NAUSEA: 0
FLANK PAIN: 0
VOMITING: 0

## 2020-11-25 NOTE — PATIENT INSTRUCTIONS
Dysuria  Dysuria is pain or discomfort while urinating. The pain or discomfort may be felt in the part of your body that drains urine from the bladder (urethra) or in the surrounding tissue of the genitals. The pain may also be felt in the groin area, lower abdomen, or lower back. You may have to urinate frequently or have the sudden feeling that you have to urinate (urgency). Dysuria can affect both men and women, but it is more common in women.  Dysuria can be caused by many different things, including:  · Urinary tract infection.  · Kidney stones or bladder stones.  · Certain sexually transmitted infections (STIs), such as chlamydia.  · Dehydration.  · Inflammation of the tissues of the vagina.  · Use of certain medicines.  · Use of certain soaps or scented products that cause irritation.  Follow these instructions at home:  General instructions  · Watch your condition for any changes.  · Urinate often. Avoid holding urine for long periods of time.  · After a bowel movement or urination, women should cleanse from front to back, using each tissue only once.  · Urinate after sexual intercourse.  · Keep all follow-up visits as told by your health care provider. This is important.  · If you had any tests done to find the cause of dysuria, it is up to you to get your test results. Ask your health care provider, or the department that is doing the test, when your results will be ready.  Eating and drinking    · Drink enough fluid to keep your urine pale yellow.  · Avoid caffeine, tea, and alcohol. They can irritate the bladder and make dysuria worse. In men, alcohol may irritate the prostate.  Medicines  · Take over-the-counter and prescription medicines only as told by your health care provider.  · If you were prescribed an antibiotic medicine, take it as told by your health care provider. Do not stop taking the antibiotic even if you start to feel better.  Contact a health care provider if:  · You have a  fever.  · You develop pain in your back or sides.  · You have nausea or vomiting.  · You have blood in your urine.  · You are not urinating as often as you usually do.  Get help right away if:  · Your pain is severe and not relieved with medicines.  · You cannot eat or drink without vomiting.  · You are confused.  · You have a rapid heartbeat while at rest.  · You have shaking or chills.  · You feel extremely weak.  Summary  · Dysuria is pain or discomfort while urinating. Many different conditions can lead to dysuria.  · If you have dysuria, you may have to urinate frequently or have the sudden feeling that you have to urinate (urgency).  · Watch your condition for any changes. Keep all follow-up visits as told by your health care provider.  · Make sure that you urinate often and drink enough fluid to keep your urine pale yellow.  This information is not intended to replace advice given to you by your health care provider. Make sure you discuss any questions you have with your health care provider.  Document Released: 09/15/2005 Document Revised: 11/30/2018 Document Reviewed: 10/04/2018  [employer] Patient Education © 2020 [employer]      Candidal Vulvovaginitis  Candidal vulvovaginitis is an infection of the vagina and vulva. The vulva is the skin around the opening of the vagina. This may cause itching and discomfort in and around the vagina.   HOME CARE  · Only take medicine as told by your doctor.  · Do not have sex (intercourse) until the infection is healed or as told by your doctor.  · Practice safe sex.  · Tell your sex partner about your infection.  · Do not douche or use tampons.  · Wear cotton underwear. Do not wear tight pants or panty hose.  · Eat yogurt. This may help treat and prevent yeast infections.  GET HELP RIGHT AWAY IF:   · You have a fever.  · Your problems get worse during treatment or do not get better in 3 days.  · You have discomfort, irritation, or itching in your vagina or vulva  area.  · You have pain after sex.  · You start to get belly (abdominal) pain.  MAKE SURE YOU:  · Understand these instructions.  · Will watch your condition.  · Will get help right away if you are not doing well or get worse.  Document Released: 03/16/2010 Document Revised: 03/11/2013 Document Reviewed: 03/16/2010  [employer]® Patient Information ©2014 [employer].      Urinary Tract Infection, Adult    A urinary tract infection (UTI) is an infection of any part of the urinary tract. The urinary tract includes the kidneys, ureters, bladder, and urethra. These organs make, store, and get rid of urine in the body.  Your health care provider may use other names to describe the infection. An upper UTI affects the ureters and kidneys (pyelonephritis). A lower UTI affects the bladder (cystitis) and urethra (urethritis).  What are the causes?  Most urinary tract infections are caused by bacteria in your genital area, around the entrance to your urinary tract (urethra). These bacteria grow and cause inflammation of your urinary tract.  What increases the risk?  You are more likely to develop this condition if:  · You have a urinary catheter that stays in place (indwelling).  · You are not able to control when you urinate or have a bowel movement (you have incontinence).  · You are female and you:  ? Use a spermicide or diaphragm for birth control.  ? Have low estrogen levels.  ? Are pregnant.  · You have certain genes that increase your risk (genetics).  · You are sexually active.  · You take antibiotic medicines.  · You have a condition that causes your flow of urine to slow down, such as:  ? An enlarged prostate, if you are male.  ? Blockage in your urethra (stricture).  ? A kidney stone.  ? A nerve condition that affects your bladder control (neurogenic bladder).  ? Not getting enough to drink, or not urinating often.  · You have certain medical conditions, such as:  ? Diabetes.  ? A weak disease-fighting system  (immunesystem).  ? Sickle cell disease.  ? Gout.  ? Spinal cord injury.  What are the signs or symptoms?  Symptoms of this condition include:  · Needing to urinate right away (urgently).  · Frequent urination or passing small amounts of urine frequently.  · Pain or burning with urination.  · Blood in the urine.  · Urine that smells bad or unusual.  · Trouble urinating.  · Cloudy urine.  · Vaginal discharge, if you are female.  · Pain in the abdomen or the lower back.  You may also have:  · Vomiting or a decreased appetite.  · Confusion.  · Irritability or tiredness.  · A fever.  · Diarrhea.  The first symptom in older adults may be confusion. In some cases, they may not have any symptoms until the infection has worsened.  How is this diagnosed?  This condition is diagnosed based on your medical history and a physical exam. You may also have other tests, including:  · Urine tests.  · Blood tests.  · Tests for sexually transmitted infections (STIs).  If you have had more than one UTI, a cystoscopy or imaging studies may be done to determine the cause of the infections.  How is this treated?  Treatment for this condition includes:  · Antibiotic medicine.  · Over-the-counter medicines to treat discomfort.  · Drinking enough water to stay hydrated.  If you have frequent infections or have other conditions such as a kidney stone, you may need to see a health care provider who specializes in the urinary tract (urologist).  In rare cases, urinary tract infections can cause sepsis. Sepsis is a life-threatening condition that occurs when the body responds to an infection. Sepsis is treated in the hospital with IV antibiotics, fluids, and other medicines.  Follow these instructions at home:    Medicines  · Take over-the-counter and prescription medicines only as told by your health care provider.  · If you were prescribed an antibiotic medicine, take it as told by your health care provider. Do not stop using the antibiotic  even if you start to feel better.  General instructions  · Make sure you:  ? Empty your bladder often and completely. Do not hold urine for long periods of time.  ? Empty your bladder after sex.  ? Wipe from front to back after a bowel movement if you are female. Use each tissue one time when you wipe.  · Drink enough fluid to keep your urine pale yellow.  · Keep all follow-up visits as told by your health care provider. This is important.  Contact a health care provider if:  · Your symptoms do not get better after 1-2 days.  · Your symptoms go away and then return.  Get help right away if you have:  · Severe pain in your back or your lower abdomen.  · A fever.  · Nausea or vomiting.  Summary  · A urinary tract infection (UTI) is an infection of any part of the urinary tract, which includes the kidneys, ureters, bladder, and urethra.  · Most urinary tract infections are caused by bacteria in your genital area, around the entrance to your urinary tract (urethra).  · Treatment for this condition often includes antibiotic medicines.  · If you were prescribed an antibiotic medicine, take it as told by your health care provider. Do not stop using the antibiotic even if you start to feel better.  · Keep all follow-up visits as told by your health care provider. This is important.  This information is not intended to replace advice given to you by your health care provider. Make sure you discuss any questions you have with your health care provider.  Document Released: 09/27/2006 Document Revised: 12/05/2019 Document Reviewed: 06/27/2019  [employer] Patient Education © 2020 [employer]

## 2020-11-25 NOTE — PROGRESS NOTES
Subjective:     Evelyn Meeks is a 60 y.o. female who presents for Painful Urination (pain, yeast coming out, skin feels raw x 3 days )      Persistent UTI symptoms after being treated with Bactrim. Vaginal itching started on Monday. Redness and swelling. Thick white discharge. Increased urination. Dysuria. No known lesions. Last bactrim yesterday.     Dysuria   This is a new problem. The current episode started in the past 7 days. The problem has been gradually worsening. The pain is at a severity of 0/10. The patient is experiencing no pain. There has been no fever. She is not sexually active. Associated symptoms include a discharge and frequency. Pertinent negatives include no flank pain, hematuria, nausea or vomiting. She has tried antibiotics for the symptoms. The treatment provided no relief. There is no history of recurrent UTIs.       Past Medical History:   Diagnosis Date   • Abnormal screening cardiac CT 6/20/2018   • Anal fissure 10/6/2011   • Back pain     left rib pain from fracture 1/2016   • Bronchitis 2015   • Chicken pox     as child   • Disorder of thyroid    • Dyslipidemia 8/19/2011   • Gastroesophageal reflux disease 9/7/2012   • Grief at loss of child 8/4/2015   • Hemorrhoid    • High cholesterol    • Kidney stone    • Measles     as child   • Migraine    • Mumps     as child   • No pertinent past medical history    • Osteoporosis 1/15/2016   • Pain 11/20/2017    left side rib   • Pneumonia 2015   • UTI (lower urinary tract infection) 2014       Past Surgical History:   Procedure Laterality Date   • CARPAL TUNNEL RELEASE Right 11/21/2017    Procedure: CARPAL TUNNEL RELEASE - REVISION;  Surgeon: Lizandro Weber M.D.;  Location: SURGERY [facility];  Service: Orthopedics   • GUYONS TUNNEL RELEASE Right 11/21/2017    Procedure: GUYONS TUNNEL RELEASE;  Surgeon: Lizandro Weber M.D.;  Location: SURGERY [facility];  Service: Orthopedics   • HARDWARE REMOVAL ORTHO Right  11/21/2017    Procedure: HARDWARE REMOVAL ORTHO - OIC DISTAL RADIUS PLATE;  Surgeon: Lizandro Weber M.D.;  Location: SURGERY [facility];  Service: Orthopedics   • CARPAL TUNNEL RELEASE Right 1/20/2017    Procedure: CARPAL TUNNEL RELEASE;  Surgeon: Jono Maynard M.D.;  Location: SURGERY [facility];  Service:    • CA INJ(S) NERVE BLOCK INTERCOSTAL EA ADD  12/12/2016    Procedure: INJ-INTERCOSTAL MULTIPLE - T9, T10;  Surgeon: Rafat Nichols D.O.;  Location: SURGERY [facility];  Service: Pain Management   • WRIST ORIF Right 8/10/2016    Procedure: WRIST ORIF;  Surgeon: Jono Maynard M.D.;  Location: SURGERY [facility];  Service:    • ABDOMINAL HYSTERECTOMY TOTAL     • GYN SURGERY      c sections x 2   • GYN SURGERY      multiple D&C's   • HEMORRHOIDECTOMY     • TONSILLECTOMY         Social History     Socioeconomic History   • Marital status:      Spouse name: Not on file   • Number of children: Not on file   • Years of education: Not on file   • Highest education level: Not on file   Occupational History   • Not on file   Social Needs   • Financial resource strain: Not on file   • Food insecurity     Worry: Not on file     Inability: Not on file   • Transportation needs     Medical: Not on file     Non-medical: Not on file   Tobacco Use   • Smoking status: Never Smoker   • Smokeless tobacco: Never Used   Substance and Sexual Activity   • Alcohol use: No     Alcohol/week: 0.0 oz   • Drug use: No   • Sexual activity: Yes     Partners: Male   Lifestyle   • Physical activity     Days per week: Not on file     Minutes per session: Not on file   • Stress: Not on file   Relationships   • Social connections     Talks on phone: Not on file     Gets together: Not on file     Attends Sabianist service: Not on file     Active member of club or organization: Not on file     Attends meetings of clubs or organizations: Not on file     Relationship status: Not on file   • Intimate partner  "violence     Fear of current or ex partner: Not on file     Emotionally abused: Not on file     Physically abused: Not on file     Forced sexual activity: Not on file   Other Topics Concern   • Not on file   Social History Narrative    ** Merged History Encounter **             Family History   Problem Relation Age of Onset   • Diabetes Mother         complications of DM   • Heart Disease Father         65   • Heart Attack Father 64   • Cancer Son         appendenoma        Allergies   Allergen Reactions   • Other Food Anaphylaxis     Wine coffee   • Shellfish Allergy Anaphylaxis     RXN=whole life   • Lidocaine (Anorectal) [Topicaine] Rash and Itching      patch glue       • Pet [Cat Hair Extract] Hives     RXN=whole life   • Pollen Extract Itching     RXN=whole life   • Tape Rash     Paper tape ok   • Erythromycin Vomiting       Review of Systems   Constitutional: Negative for fever.   Gastrointestinal: Negative for abdominal pain, nausea and vomiting.   Genitourinary: Positive for dysuria and frequency. Negative for flank pain and hematuria.   Skin: Positive for itching. Negative for rash.   All other systems reviewed and are negative.       Objective:   /66 (BP Location: Right arm, Patient Position: Sitting)   Pulse 78   Temp 36.2 °C (97.1 °F) (Temporal)   Resp 12   Ht 1.702 m (5' 7\")   Wt 57.8 kg (127 lb 6.4 oz)   LMP 08/28/2002   SpO2 96%   BMI 19.95 kg/m²     Physical Exam  Vitals signs reviewed.   Constitutional:       General: She is not in acute distress.     Appearance: She is well-developed.   HENT:      Head: Normocephalic and atraumatic.      Right Ear: External ear normal.      Left Ear: External ear normal.      Nose: Nose normal.   Eyes:      Conjunctiva/sclera: Conjunctivae normal.   Neck:      Musculoskeletal: Normal range of motion.   Cardiovascular:      Rate and Rhythm: Normal rate.   Pulmonary:      Effort: Pulmonary effort is normal.   Abdominal:      General: Bowel sounds are " normal. There is no distension.      Palpations: Abdomen is soft.      Tenderness: There is no abdominal tenderness. There is no right CVA tenderness, left CVA tenderness or guarding.   Genitourinary:     Pubic Area: No rash.       Labia:         Right: No lesion.         Left: No lesion.       Comments: Thick white discharge, Labia minora, erythema, greatest on right side.  Musculoskeletal: Normal range of motion.   Skin:     General: Skin is warm and dry.      Findings: No rash.   Neurological:      General: No focal deficit present.      Mental Status: She is alert and oriented to person, place, and time.      GCS: GCS eye subscore is 4. GCS verbal subscore is 5. GCS motor subscore is 6.   Psychiatric:         Mood and Affect: Mood normal.         Speech: Speech normal.         Behavior: Behavior normal.         Thought Content: Thought content normal.         Judgment: Judgment normal.         Assessment/Plan:   1. Acute cystitis without hematuria  - POCT Urinalysis  - cefdinir (OMNICEF) 300 MG Cap; Take 1 Cap by mouth every 12 hours for 5 days.  Dispense: 10 Cap; Refill: 0    2. Vaginal discharge  - VAGINAL PATHOGENS DNA PANEL; Future  - fluconazole (DIFLUCAN) 150 MG tablet; Take 1 Tab by mouth every day.  Dispense: 2 Tab; Refill: 0    3. Candidal vulvovaginitis  - VAGINAL PATHOGENS DNA PANEL; Future  - fluconazole (DIFLUCAN) 150 MG tablet; Take 1 Tab by mouth every day.  Dispense: 2 Tab; Refill: 0    Results for orders placed or performed during the hospital encounter of 11/25/20   VAGINAL PATHOGENS DNA PANEL   Result Value Ref Range    Candida species DNA Probe Negative Negative    Trichamonas vaginalis DNA Probe Negative Negative    Gardnerella vaginalis DNA Probe Negative Negative   -Oral Hydration: Drink plenty of water.  -Take antibiotic as prescribed.  -Follow up with PCP.    Follow up urgently for new or persistent abdominal pain, flank pain, difficulty with urination, fevers, vomiting, weakness,  tachycardia, or any other concerns. Culture tested positive for Escherichia coli. Discussed extending antibiotic coverage for UTI symptoms.     Differential diagnosis, natural history, supportive care, and indications for immediate follow-up discussed.

## 2020-11-26 DIAGNOSIS — B37.31 CANDIDAL VULVOVAGINITIS: ICD-10-CM

## 2020-11-26 DIAGNOSIS — N89.8 VAGINAL DISCHARGE: ICD-10-CM

## 2020-11-26 LAB
CANDIDA DNA VAG QL PROBE+SIG AMP: NEGATIVE
G VAGINALIS DNA VAG QL PROBE+SIG AMP: NEGATIVE
T VAGINALIS DNA VAG QL PROBE+SIG AMP: NEGATIVE

## 2020-11-27 RX ORDER — LORATADINE 10 MG/1
TABLET ORAL
Status: SHIPPED | COMMUNITY
Start: 2020-10-27 | End: 2020-12-04

## 2020-11-27 ASSESSMENT — ENCOUNTER SYMPTOMS
FEVER: 0
ABDOMINAL PAIN: 0

## 2020-12-04 RX ORDER — LEVOTHYROXINE SODIUM 0.05 MG/1
TABLET ORAL
Qty: 30 TAB | Refills: 2 | Status: SHIPPED | OUTPATIENT
Start: 2020-12-04 | End: 2021-01-28 | Stop reason: SDUPTHER

## 2020-12-04 RX ORDER — LORATADINE 10 MG/1
TABLET ORAL
Qty: 30 TAB | Refills: 10 | Status: SHIPPED | OUTPATIENT
Start: 2020-12-04 | End: 2021-01-28 | Stop reason: SDUPTHER

## 2020-12-04 NOTE — TELEPHONE ENCOUNTER
Received request via: Pharmacy    Was the patient seen in the last year in this department? Yes  11/20/20  Does the patient have an active prescription (recently filled or refills available) for medication(s) requested? No

## 2020-12-10 ENCOUNTER — HOSPITAL ENCOUNTER (OUTPATIENT)
Facility: MEDICAL CENTER | Age: 60
End: 2020-12-10
Attending: NURSE PRACTITIONER
Payer: COMMERCIAL

## 2020-12-10 ENCOUNTER — OFFICE VISIT (OUTPATIENT)
Dept: MEDICAL GROUP | Facility: LAB | Age: 60
End: 2020-12-10
Payer: COMMERCIAL

## 2020-12-10 VITALS
HEIGHT: 67 IN | HEART RATE: 95 BPM | DIASTOLIC BLOOD PRESSURE: 62 MMHG | SYSTOLIC BLOOD PRESSURE: 90 MMHG | WEIGHT: 126 LBS | OXYGEN SATURATION: 95 % | RESPIRATION RATE: 12 BRPM | BODY MASS INDEX: 19.78 KG/M2 | TEMPERATURE: 98.2 F

## 2020-12-10 DIAGNOSIS — N76.0 ACUTE VAGINITIS: ICD-10-CM

## 2020-12-10 DIAGNOSIS — N89.8 VAGINAL LESION: ICD-10-CM

## 2020-12-10 PROCEDURE — 99214 OFFICE O/P EST MOD 30 MIN: CPT | Performed by: NURSE PRACTITIONER

## 2020-12-10 PROCEDURE — 87480 CANDIDA DNA DIR PROBE: CPT

## 2020-12-10 PROCEDURE — 87510 GARDNER VAG DNA DIR PROBE: CPT

## 2020-12-10 PROCEDURE — 87660 TRICHOMONAS VAGIN DIR PROBE: CPT

## 2020-12-10 RX ORDER — CLINDAMYCIN PHOSPHATE 20 MG/G
1 CREAM VAGINAL EVERY EVENING
Qty: 40 G | Refills: 0 | Status: SHIPPED | OUTPATIENT
Start: 2020-12-10 | End: 2020-12-17

## 2020-12-10 ASSESSMENT — FIBROSIS 4 INDEX: FIB4 SCORE: 1.55

## 2020-12-10 NOTE — PROGRESS NOTES
"Chief Complaint   Patient presents with   • Vaginitis     X 2 weeks        HPI  Griselda is a 60-year-old established female of Dr. Crawford's here with complaint of several weeks of vaginal discomfort - new issue to me today.  Symptoms started originally with burning with urination the week before thanksgikirti, was seen here, diagnosed uti and placed on bactrim - cx + for e.coli.  Started having external vaginal discomfort a few d later with d/c - went to UC and vaginal pathogens was collected via patient and she states it broke / couldn't insert sufficiently - negative yeast/BV.  Given cefdinir and fluconazole through UC.  Now experiencing vaginal pain, itching, foul odor, thin yellow vaginal d/c.  No fever / chills.  Denies bowel issues.  Appetite is normal.   + hx of recurrent UTI but not yeast.   Not diabetic.   Wk: [employer]  Nonsmoker.   No etoh.  Hx of total hysterectomy 2001 b/c of menorrhagia  No other associated symptoms.  Has not had a pelvic exam since symptoms started.    Past medical, surgical, family, and social history is reviewed and updated in Epic chart by me today.   Medications and allergies reviewed and updated in Epic chart by me today.     ROS:   As documented in history of present illness above    Exam:  BP (!) 90/62 (BP Location: Left arm, Patient Position: Sitting, BP Cuff Size: Adult)   Pulse 95   Temp 36.8 °C (98.2 °F)   Resp 12   Ht 1.702 m (5' 7\")   Wt 57.2 kg (126 lb)   SpO2 95%   Constitutional: Alert, no distress, plus 3 vital signs  Skin:  Warm, dry, no rashes invisible areas  Eye: Equal, round and reactive, conjunctiva clear  ENMT: Lips without lesions, good dentition, oropharynx clear    Neck: Trachea midline, no masses, no thyromegaly  Respiratory: Unlabored respiration, lungs clear to auscultation, no wheezes, no rhonchi  Cardiovascular: Normal rate and rhythm, no murmur, no edema  :  She has an approx 1 cm x 1 cm pentagon appearing raw lesion to vulvar area.  Also " thin yellow d/c in vaginal vault and externally -otherwise no other abnormality such as vesicles or thick white discharge.  Psych: Alert, pleasant, well-groomed, normal affect    Assessment / Plan / Medical Decision makin. Acute vaginitis  -Suspect that she has bacterial vaginosis based on vaginal discomfort and copious amounts of yellow discharge in her vaginal vault, which I discussed with her.  Vaginal pathogens was recollected by myself today during pelvic exam.  Start clindamycin vaginal cream nightly for the next 7 nights and I will follow-up with her as soon as her vaginal pathogen returns.  - clindamycin (CLEOCIN) 2 % vaginal cream; Insert 1 Applicator into the vagina every evening for 7 days.  Dispense: 40 g; Refill: 0  - VAGINAL PATHOGENS DNA PANEL; Future    2. Vaginal lesion  -Fortunately low risk for vulvar cancer as she has never been a smoker.  Likely external skin irritation related to previous yeast infection in relationship to taking 2 different antibiotics.  No sign of yeast today on exam.  Recommend A&E or Desitin ointment to the open lesion for the next 2 weeks, loose fitting clothing and avoiding underwear when possible.  I like to see her back here to recheck this lesion in 2 to 3 weeks, if it is persistent I will refer her to dermatology for biopsy.

## 2020-12-11 DIAGNOSIS — N76.0 ACUTE VAGINITIS: ICD-10-CM

## 2020-12-11 LAB
CANDIDA DNA VAG QL PROBE+SIG AMP: NEGATIVE
G VAGINALIS DNA VAG QL PROBE+SIG AMP: POSITIVE
T VAGINALIS DNA VAG QL PROBE+SIG AMP: NEGATIVE

## 2020-12-20 DIAGNOSIS — Z23 NEED FOR VACCINATION: ICD-10-CM

## 2020-12-22 ENCOUNTER — APPOINTMENT (OUTPATIENT)
Dept: FAMILY PLANNING/WOMEN'S HEALTH CLINIC | Facility: IMMUNIZATION CENTER | Age: 60
End: 2020-12-22
Attending: FAMILY MEDICINE
Payer: COMMERCIAL

## 2020-12-22 PROCEDURE — 0001A PFIZER SARS-COV-2 VACCINE: CPT

## 2020-12-22 PROCEDURE — 91300 PFIZER SARS-COV-2 VACCINE: CPT

## 2020-12-23 ENCOUNTER — IMMUNIZATION (OUTPATIENT)
Dept: FAMILY PLANNING/WOMEN'S HEALTH CLINIC | Facility: IMMUNIZATION CENTER | Age: 60
End: 2020-12-23

## 2020-12-23 DIAGNOSIS — Z23 ENCOUNTER FOR VACCINATION: Primary | ICD-10-CM

## 2021-01-01 NOTE — TELEPHONE ENCOUNTER
----- Message from Your Healthcare Team sent at 6/22/2017  3:45 PM PDT -----  Regarding: Prescription Question  Contact: [phone]  Can you please send a prescription of Bactrim to the [facility]. I believe I have a UTI.  Thanks   No

## 2021-01-12 ENCOUNTER — IMMUNIZATION (OUTPATIENT)
Dept: FAMILY PLANNING/WOMEN'S HEALTH CLINIC | Facility: IMMUNIZATION CENTER | Age: 61
End: 2021-01-12
Attending: FAMILY MEDICINE
Payer: COMMERCIAL

## 2021-01-12 DIAGNOSIS — Z23 ENCOUNTER FOR VACCINATION: Primary | ICD-10-CM

## 2021-01-12 PROCEDURE — 91300 PFIZER SARS-COV-2 VACCINE: CPT

## 2021-01-12 PROCEDURE — 0002A PFIZER SARS-COV-2 VACCINE: CPT

## 2021-01-28 ENCOUNTER — OFFICE VISIT (OUTPATIENT)
Dept: MEDICAL GROUP | Facility: LAB | Age: 61
End: 2021-01-28
Payer: COMMERCIAL

## 2021-01-28 VITALS
HEART RATE: 72 BPM | OXYGEN SATURATION: 98 % | HEIGHT: 67 IN | RESPIRATION RATE: 12 BRPM | SYSTOLIC BLOOD PRESSURE: 105 MMHG | TEMPERATURE: 97.3 F | DIASTOLIC BLOOD PRESSURE: 71 MMHG | WEIGHT: 131.2 LBS | BODY MASS INDEX: 20.59 KG/M2

## 2021-01-28 DIAGNOSIS — J30.2 SEASONAL ALLERGIES: ICD-10-CM

## 2021-01-28 DIAGNOSIS — E78.5 DYSLIPIDEMIA: ICD-10-CM

## 2021-01-28 DIAGNOSIS — S22.22XD CLOSED FRACTURE OF BODY OF STERNUM WITH ROUTINE HEALING, SUBSEQUENT ENCOUNTER: ICD-10-CM

## 2021-01-28 DIAGNOSIS — G47.00 INSOMNIA, UNSPECIFIED TYPE: ICD-10-CM

## 2021-01-28 DIAGNOSIS — M19.90 ARTHRITIS: ICD-10-CM

## 2021-01-28 DIAGNOSIS — S49.91XD INJURY OF RIGHT SHOULDER, SUBSEQUENT ENCOUNTER: ICD-10-CM

## 2021-01-28 PROCEDURE — 99214 OFFICE O/P EST MOD 30 MIN: CPT | Performed by: INTERNAL MEDICINE

## 2021-01-28 RX ORDER — ROSUVASTATIN CALCIUM 20 MG/1
20 TABLET, COATED ORAL EVERY EVENING
Qty: 90 TAB | Refills: 3 | Status: SHIPPED | OUTPATIENT
Start: 2021-01-28 | End: 2022-02-10

## 2021-01-28 RX ORDER — LEVOTHYROXINE SODIUM 0.05 MG/1
50 TABLET ORAL
Qty: 90 TAB | Refills: 3 | Status: SHIPPED | OUTPATIENT
Start: 2021-01-28 | End: 2022-02-10

## 2021-01-28 RX ORDER — LORATADINE 10 MG/1
10 TABLET ORAL DAILY
Qty: 90 TAB | Refills: 3 | Status: SHIPPED | OUTPATIENT
Start: 2021-01-28 | End: 2022-02-10

## 2021-01-28 RX ORDER — IBUPROFEN 800 MG/1
800 TABLET ORAL EVERY 8 HOURS PRN
Qty: 180 TAB | Refills: 3 | Status: SHIPPED | OUTPATIENT
Start: 2021-01-28 | End: 2022-02-10

## 2021-01-28 RX ORDER — TRAZODONE HYDROCHLORIDE 50 MG/1
50 TABLET ORAL
Qty: 30 TAB | Refills: 3 | Status: SHIPPED
Start: 2021-01-28 | End: 2021-07-27

## 2021-01-28 ASSESSMENT — FIBROSIS 4 INDEX: FIB4 SCORE: 1.55

## 2021-01-28 ASSESSMENT — PATIENT HEALTH QUESTIONNAIRE - PHQ9: CLINICAL INTERPRETATION OF PHQ2 SCORE: 0

## 2021-01-29 NOTE — PROGRESS NOTES
CC: Evelyn Meeks is a 60 y.o. female is suffering from   Chief Complaint   Patient presents with   • Follow-Up     6 months follow up         SUBJECTIVE:  1. Closed fracture of body of sternum with routine healing, subsequent encounter  Patient is here for follow-up has a history of fractured sternum, has continued use ibuprofen warned the patient about development of stomach ulcers    2. Injury of right shoulder, subsequent encounter  Clinically stable    3. Arthritis  Continue ibuprofen but cautioned about the use of a chronically because of stomach ulcers damage to the kidneys    4. Dyslipidemia  Recheck cholesterol    5. Seasonal allergies  Continue Claritin    6. Insomnia, unspecified type  Patient is to start on trazodone 50 mg nightly warned about side effects including daytime somnolence        Past social, family, history: , custody over her grandchildren x4 ranging ages 3 through 11  Social History     Tobacco Use   • Smoking status: Never Smoker   • Smokeless tobacco: Never Used   Substance Use Topics   • Alcohol use: No     Alcohol/week: 0.0 oz   • Drug use: No         MEDICATIONS:    Current Outpatient Medications:   •  levothyroxine (SYNTHROID) 50 MCG Tab, Take 1 Tab by mouth Every morning on an empty stomach. ON EMPTY STOMACH, Disp: 90 Tab, Rfl: 3  •  ibuprofen (MOTRIN) 800 MG Tab, Take 1 Tab by mouth every 8 hours as needed., Disp: 180 Tab, Rfl: 3  •  rosuvastatin (CRESTOR) 20 MG Tab, Take 1 Tab by mouth every evening., Disp: 90 Tab, Rfl: 3  •  loratadine (CLARITIN) 10 MG Tab, Take 1 Tab by mouth every day. TAKE ONE TABLET BY MOUTH DAILY, Disp: 90 Tab, Rfl: 3  •  traZODone (DESYREL) 50 MG Tab, Take 1 Tab by mouth every bedtime., Disp: 30 Tab, Rfl: 3  •  acetaminophen (TYLENOL) 500 MG Tab, Take 2 Tabs by mouth every 6 hours., Disp: 30 Tab, Rfl: 0  •  Cholecalciferol (VITAMIN D-3) 1000 UNITS Cap, Take 1,000 Units by mouth every day., Disp: , Rfl:     PROBLEMS:  Patient Active Problem  "List    Diagnosis Date Noted   • Closed fracture of sternum 01/12/2020     Priority: High   • Trauma 01/12/2020     Priority: Low   • Other specified hypothyroidism 07/19/2016     Priority: Low   • Dyslipidemia 08/19/2011     Priority: Low   • Elevated coronary artery calcium score: Approximately 720 18 07/06/2018   • Abnormal screening cardiac CT 06/20/2018   • Carpal tunnel syndrome on right 01/20/2017   • Thoracic back pain 12/12/2016   • Closed fracture of distal end of right radius 08/10/2016   • Osteoporosis 01/15/2016   • Grief at loss of child 08/04/2015   • ADD (attention deficit disorder) 03/26/2013   • Gastroesophageal reflux disease 09/07/2012   • Anal fissure 10/06/2011       REVIEW OF SYSTEMS:  Gen.:  No Nausea, Vomiting, fever, Chills.  Heart: No chest pain.  Lungs:  No shortness of Breath.  Psychological: Anxiety consistent with raising 4 grandchildren who are young     PHYSICAL EXAM   Constitutional: Alert, cooperative, not in acute distress.  Cardiovascular:  Rate Rhythm is regular without murmurs rubs clicks.     Thorax & Lungs: Clear to auscultation, no wheezing, rhonchi, or rales  HENT: Normocephalic, Atraumatic.  Eyes: PERRLA, EOMI, Conjunctiva normal.   Neck: Trachia is midline no swelling of the thyroid.   Lymphatic: No lymphadenopathy noted.   Neurologic: Alert & oriented x 3, cranial nerves II through XII are intact, Normal motor function, Normal sensory function, No focal deficits noted.   Psychiatric: Affect normal, Judgment normal, Mood normal.     VITAL SIGNS:/71   Pulse 72   Temp 36.3 °C (97.3 °F) (Temporal)   Resp 12   Ht 1.702 m (5' 7\")   Wt 59.5 kg (131 lb 3.2 oz)   LMP 08/28/2002   SpO2 98%   BMI 20.55 kg/m²     Labs: Reviewed    Assessment:                                                     Plan:    1. Closed fracture of body of sternum with routine healing, subsequent encounter  Continue ibuprofen  - ibuprofen (MOTRIN) 800 MG Tab; Take 1 Tab by mouth every 8 hours " as needed.  Dispense: 180 Tab; Refill: 3    2. Injury of right shoulder, subsequent encounter  As above  - ibuprofen (MOTRIN) 800 MG Tab; Take 1 Tab by mouth every 8 hours as needed.  Dispense: 180 Tab; Refill: 3    3. Arthritis  As above  - levothyroxine (SYNTHROID) 50 MCG Tab; Take 1 Tab by mouth Every morning on an empty stomach. ON EMPTY STOMACH  Dispense: 90 Tab; Refill: 3    4. Dyslipidemia  Continue Crestor  - rosuvastatin (CRESTOR) 20 MG Tab; Take 1 Tab by mouth every evening.  Dispense: 90 Tab; Refill: 3    5. Seasonal allergies  Continue Claritin  - loratadine (CLARITIN) 10 MG Tab; Take 1 Tab by mouth every day. TAKE ONE TABLET BY MOUTH DAILY  Dispense: 90 Tab; Refill: 3    6. Insomnia, unspecified type  Prescription written for trazodone, patient is to discontinue if she cannot tolerate it and notify me and will try different medication if necessary.  - traZODone (DESYREL) 50 MG Tab; Take 1 Tab by mouth every bedtime.  Dispense: 30 Tab; Refill: 3

## 2021-07-27 ENCOUNTER — OFFICE VISIT (OUTPATIENT)
Dept: MEDICAL GROUP | Facility: LAB | Age: 61
End: 2021-07-27
Payer: COMMERCIAL

## 2021-07-27 VITALS
HEIGHT: 67 IN | OXYGEN SATURATION: 96 % | RESPIRATION RATE: 12 BRPM | WEIGHT: 132 LBS | HEART RATE: 78 BPM | SYSTOLIC BLOOD PRESSURE: 106 MMHG | TEMPERATURE: 97.2 F | DIASTOLIC BLOOD PRESSURE: 60 MMHG | BODY MASS INDEX: 20.72 KG/M2

## 2021-07-27 DIAGNOSIS — R05.9 COUGH: ICD-10-CM

## 2021-07-27 DIAGNOSIS — Z13.220 SCREENING CHOLESTEROL LEVEL: ICD-10-CM

## 2021-07-27 DIAGNOSIS — E55.9 VITAMIN D DEFICIENCY: ICD-10-CM

## 2021-07-27 DIAGNOSIS — J02.9 SORE THROAT: ICD-10-CM

## 2021-07-27 LAB
INT CON NEG: NORMAL
INT CON POS: NORMAL
S PYO AG THROAT QL: NEGATIVE

## 2021-07-27 PROCEDURE — 99214 OFFICE O/P EST MOD 30 MIN: CPT | Performed by: INTERNAL MEDICINE

## 2021-07-27 PROCEDURE — 87880 STREP A ASSAY W/OPTIC: CPT | Performed by: INTERNAL MEDICINE

## 2021-07-27 RX ORDER — AMOXICILLIN AND CLAVULANATE POTASSIUM 875; 125 MG/1; MG/1
1 TABLET, FILM COATED ORAL 2 TIMES DAILY
Qty: 14 TABLET | Refills: 0 | Status: SHIPPED
Start: 2021-07-27 | End: 2022-06-21

## 2021-07-27 ASSESSMENT — FIBROSIS 4 INDEX: FIB4 SCORE: 1.55

## 2021-07-28 NOTE — PROGRESS NOTES
CC: Evelyn Meeks is a 60 y.o. female is suffering from   Chief Complaint   Patient presents with   • Follow-Up     6 months follow up   • Pharyngitis     x 5 days scratchy throat and green phlegm / exposed to strep         SUBJECTIVE:  1. Sore throat  Patient is here for follow-up has problems with sore throat, we have discussed that she is also coughing up green phlegm.  Rapid strep is negative, antibiotics still prescribed    2. Cough  History of cough with green phlegm likely secondary to smoke    3. Vitamin D deficiency  Recheck vitamin D    4. Screening cholesterol level  Check lipid profile        Past social, family, history: , with raising 4 grandchildren  Social History     Tobacco Use   • Smoking status: Never Smoker   • Smokeless tobacco: Never Used   Vaping Use   • Vaping Use: Never used   Substance Use Topics   • Alcohol use: No     Alcohol/week: 0.0 oz   • Drug use: No         MEDICATIONS:    Current Outpatient Medications:   •  amoxicillin-clavulanate (AUGMENTIN) 875-125 MG Tab, Take 1 tablet by mouth 2 times a day., Disp: 14 tablet, Rfl: 0  •  levothyroxine (SYNTHROID) 50 MCG Tab, Take 1 Tab by mouth Every morning on an empty stomach. ON EMPTY STOMACH, Disp: 90 Tab, Rfl: 3  •  ibuprofen (MOTRIN) 800 MG Tab, Take 1 Tab by mouth every 8 hours as needed., Disp: 180 Tab, Rfl: 3  •  rosuvastatin (CRESTOR) 20 MG Tab, Take 1 Tab by mouth every evening., Disp: 90 Tab, Rfl: 3  •  loratadine (CLARITIN) 10 MG Tab, Take 1 Tab by mouth every day. TAKE ONE TABLET BY MOUTH DAILY, Disp: 90 Tab, Rfl: 3  •  acetaminophen (TYLENOL) 500 MG Tab, Take 2 Tabs by mouth every 6 hours., Disp: 30 Tab, Rfl: 0  •  Cholecalciferol (VITAMIN D-3) 1000 UNITS Cap, Take 1,000 Units by mouth every day., Disp: , Rfl:     PROBLEMS:  Patient Active Problem List    Diagnosis Date Noted   • Closed fracture of sternum 01/12/2020   • Trauma 01/12/2020   • Elevated coronary artery calcium score: Approximately 720 18  "07/06/2018   • Abnormal screening cardiac CT 06/20/2018   • Carpal tunnel syndrome on right 01/20/2017   • Thoracic back pain 12/12/2016   • Closed fracture of distal end of right radius 08/10/2016   • Other specified hypothyroidism 07/19/2016   • Osteoporosis 01/15/2016   • Grief at loss of child 08/04/2015   • ADD (attention deficit disorder) 03/26/2013   • Gastroesophageal reflux disease 09/07/2012   • Anal fissure 10/06/2011   • Dyslipidemia 08/19/2011       REVIEW OF SYSTEMS:  Gen.:  No Nausea, Vomiting, fever, Chills.  Heart: No chest pain.  Lungs:  No shortness of Breath.  Psychological: Geo unusual Anxiety depression     PHYSICAL EXAM   Constitutional: Alert, cooperative, not in acute distress.  Cardiovascular:  Rate Rhythm is regular without murmurs rubs clicks.     Thorax & Lungs: Clear to auscultation, no wheezing, rhonchi, or rales  HENT: Normocephalic, Atraumatic.  Eyes: PERRLA, EOMI, Conjunctiva normal.   Neck: Trachia is midline no swelling of the thyroid.   Lymphatic: No lymphadenopathy noted.   Neurologic: Alert & oriented x 3, cranial nerves II through XII are intact, Normal motor function, Normal sensory function, No focal deficits noted.   Psychiatric: Affect normal, Judgment normal, Mood normal.     VITAL SIGNS:/60   Pulse 78   Temp 36.2 °C (97.2 °F) (Temporal)   Resp 12   Ht 1.702 m (5' 7\")   Wt 59.9 kg (132 lb)   LMP 08/28/2002   SpO2 96%   BMI 20.67 kg/m²     Labs: Reviewed    Assessment:                                                     Plan:    1. Sore throat  Rapid strep negative  - POCT Rapid Strep A  - CBC WITH DIFFERENTIAL; Future  - Comp Metabolic Panel; Future    2. Cough  Augmentin prescribed likely cough with green phlegm from smoke, prescription written.  - amoxicillin-clavulanate (AUGMENTIN) 875-125 MG Tab; Take 1 tablet by mouth 2 times a day.  Dispense: 14 tablet; Refill: 0  - CBC WITH DIFFERENTIAL; Future  - Comp Metabolic Panel; Future    3. Vitamin D " deficiency  Recheck vitamin D  - CBC WITH DIFFERENTIAL; Future  - VITAMIN D,25 HYDROXY; Future    4. Screening cholesterol level  Recheck lipid profile  - CBC WITH DIFFERENTIAL; Future  - Comp Metabolic Panel; Future  - Lipid Profile; Future

## 2021-12-22 ENCOUNTER — TELEPHONE (OUTPATIENT)
Dept: MEDICAL GROUP | Facility: LAB | Age: 61
End: 2021-12-22

## 2021-12-22 DIAGNOSIS — J06.9 UPPER RESPIRATORY TRACT INFECTION, UNSPECIFIED TYPE: ICD-10-CM

## 2021-12-22 RX ORDER — AMOXICILLIN AND CLAVULANATE POTASSIUM 875; 125 MG/1; MG/1
1 TABLET, FILM COATED ORAL 2 TIMES DAILY
Qty: 14 TABLET | Refills: 0 | Status: SHIPPED
Start: 2021-12-22 | End: 2022-06-21

## 2021-12-22 NOTE — TELEPHONE ENCOUNTER
Maryellen:  Please call Griselda Nur, I have sent Augmentin over to her [facility].   Regards, Segundo Crawford, DO

## 2021-12-22 NOTE — TELEPHONE ENCOUNTER
1. Caller Name: Griselda                        Call Back Number: [phone] (home)       How would the patient prefer to be contacted with a response: Phone call OK to leave a detailed message    Pt has cough and headache.  Her  has bronchitis.  She doesn't want to come in.  Can you help her?  She asked for a phone call back.

## 2022-02-10 DIAGNOSIS — E78.5 DYSLIPIDEMIA: ICD-10-CM

## 2022-02-10 DIAGNOSIS — J30.2 SEASONAL ALLERGIES: ICD-10-CM

## 2022-02-10 DIAGNOSIS — S49.91XD INJURY OF RIGHT SHOULDER, SUBSEQUENT ENCOUNTER: ICD-10-CM

## 2022-02-10 DIAGNOSIS — S22.22XD CLOSED FRACTURE OF BODY OF STERNUM WITH ROUTINE HEALING, SUBSEQUENT ENCOUNTER: ICD-10-CM

## 2022-02-10 DIAGNOSIS — M19.90 ARTHRITIS: ICD-10-CM

## 2022-02-10 RX ORDER — ROSUVASTATIN CALCIUM 20 MG/1
TABLET, COATED ORAL
Qty: 90 TABLET | Refills: 3 | Status: SHIPPED | OUTPATIENT
Start: 2022-02-10 | End: 2023-02-15 | Stop reason: SDUPTHER

## 2022-02-10 RX ORDER — IBUPROFEN 800 MG/1
TABLET ORAL
Qty: 180 TABLET | Refills: 3 | Status: SHIPPED | OUTPATIENT
Start: 2022-02-10 | End: 2023-05-05

## 2022-02-10 RX ORDER — LORATADINE 10 MG/1
TABLET ORAL
Qty: 90 TABLET | Refills: 3 | Status: SHIPPED | OUTPATIENT
Start: 2022-02-10 | End: 2023-02-15 | Stop reason: SDUPTHER

## 2022-02-10 RX ORDER — LEVOTHYROXINE SODIUM 0.05 MG/1
TABLET ORAL
Qty: 90 TABLET | Refills: 3 | Status: SHIPPED | OUTPATIENT
Start: 2022-02-10 | End: 2023-02-15 | Stop reason: SDUPTHER

## 2022-02-10 NOTE — TELEPHONE ENCOUNTER
Received request via: Pharmacy    Was the patient seen in the last year in this department? Yes  7/27/2021  Does the patient have an active prescription (recently filled or refills available) for medication(s) requested? No

## 2022-05-20 ENCOUNTER — TELEPHONE (OUTPATIENT)
Dept: MEDICAL GROUP | Facility: LAB | Age: 62
End: 2022-05-20

## 2022-05-20 NOTE — TELEPHONE ENCOUNTER
1. Caller Name: Griselda                        Call Back Number: [phone] (home)        How would the patient prefer to be contacted with a response: Phone call OK to leave a detailed message    Pt is asking for ointment for a stye in her left eye.  I called back and left a message letting her know that Dr Crawford is out of the office today and that urgent care might be a good option for her.

## 2022-06-21 ENCOUNTER — TELEPHONE (OUTPATIENT)
Dept: MEDICAL GROUP | Facility: LAB | Age: 62
End: 2022-06-21

## 2022-06-21 DIAGNOSIS — J06.9 UPPER RESPIRATORY TRACT INFECTION, UNSPECIFIED TYPE: ICD-10-CM

## 2022-06-21 RX ORDER — AMOXICILLIN AND CLAVULANATE POTASSIUM 875; 125 MG/1; MG/1
1 TABLET, FILM COATED ORAL 2 TIMES DAILY
Qty: 14 TABLET | Refills: 0 | Status: SHIPPED | OUTPATIENT
Start: 2022-06-21

## 2022-06-21 NOTE — TELEPHONE ENCOUNTER
Trena:  Please call Evelyn, Augmentin has been sent to her pharmacy.   Regards, Segundo Crawford,

## 2022-06-21 NOTE — TELEPHONE ENCOUNTER
1. Caller Name: Evelyn Meeks   Call Back Number: [phone] (home)         How would the patient prefer to be contacted with a response:     Pt LVM stating she has bronchitis. She has had a cough for 2 weeks and not going away. Asking for medication.

## 2023-02-15 DIAGNOSIS — J30.2 SEASONAL ALLERGIES: ICD-10-CM

## 2023-02-15 DIAGNOSIS — E78.5 DYSLIPIDEMIA: ICD-10-CM

## 2023-02-15 DIAGNOSIS — M19.90 ARTHRITIS: ICD-10-CM

## 2023-02-15 RX ORDER — LEVOTHYROXINE SODIUM 0.05 MG/1
50 TABLET ORAL
Qty: 90 TABLET | Refills: 3 | Status: SHIPPED | OUTPATIENT
Start: 2023-02-15 | End: 2024-01-09 | Stop reason: SDUPTHER

## 2023-02-15 RX ORDER — ROSUVASTATIN CALCIUM 20 MG/1
20 TABLET, COATED ORAL EVERY EVENING
Qty: 90 TABLET | Refills: 3 | Status: SHIPPED | OUTPATIENT
Start: 2023-02-15 | End: 2024-01-09 | Stop reason: SDUPTHER

## 2023-02-15 RX ORDER — LORATADINE 10 MG/1
10 TABLET ORAL DAILY
Qty: 90 TABLET | Refills: 3 | Status: SHIPPED | OUTPATIENT
Start: 2023-02-15 | End: 2024-01-09 | Stop reason: SDUPTHER

## 2023-02-15 NOTE — TELEPHONE ENCOUNTER
Received request via: Patient    Was the patient seen in the last year in this department? No    Does the patient have an active prescription (recently filled or refills available) for medication(s) requested? No    Does the patient have skilled nursing Plus and need 100 day supply (blood pressure, diabetes and cholesterol meds only)? Patient does not have SCP

## 2023-05-05 DIAGNOSIS — S49.91XD INJURY OF RIGHT SHOULDER, SUBSEQUENT ENCOUNTER: ICD-10-CM

## 2023-05-05 DIAGNOSIS — S22.22XD CLOSED FRACTURE OF BODY OF STERNUM WITH ROUTINE HEALING, SUBSEQUENT ENCOUNTER: ICD-10-CM

## 2023-05-05 RX ORDER — IBUPROFEN 800 MG/1
TABLET ORAL
Qty: 180 TABLET | Refills: 3 | Status: SHIPPED | OUTPATIENT
Start: 2023-05-05

## 2023-05-05 NOTE — TELEPHONE ENCOUNTER
Received request via: Pharmacy    Was the patient seen in the last year in this department? No 07/27/21    Does the patient have an active prescription (recently filled or refills available) for medication(s) requested? No    Does the patient have nursing home Plus and need 100 day supply (blood pressure, diabetes and cholesterol meds only)? Patient does not have SCP

## 2024-01-09 ENCOUNTER — OFFICE VISIT (OUTPATIENT)
Dept: MEDICAL GROUP | Facility: LAB | Age: 64
End: 2024-01-09

## 2024-01-09 VITALS
WEIGHT: 144 LBS | RESPIRATION RATE: 16 BRPM | TEMPERATURE: 98.4 F | SYSTOLIC BLOOD PRESSURE: 104 MMHG | DIASTOLIC BLOOD PRESSURE: 68 MMHG | BODY MASS INDEX: 22.6 KG/M2 | HEIGHT: 67 IN | OXYGEN SATURATION: 99 % | HEART RATE: 62 BPM

## 2024-01-09 DIAGNOSIS — E78.5 DYSLIPIDEMIA: ICD-10-CM

## 2024-01-09 DIAGNOSIS — M19.90 ARTHRITIS: ICD-10-CM

## 2024-01-09 DIAGNOSIS — E55.9 VITAMIN D DEFICIENCY: ICD-10-CM

## 2024-01-09 DIAGNOSIS — E03.9 HYPOTHYROIDISM, UNSPECIFIED TYPE: ICD-10-CM

## 2024-01-09 DIAGNOSIS — G47.00 INSOMNIA, UNSPECIFIED TYPE: ICD-10-CM

## 2024-01-09 DIAGNOSIS — F41.9 ANXIETY: ICD-10-CM

## 2024-01-09 DIAGNOSIS — Z00.00 PE (PHYSICAL EXAM), ANNUAL: ICD-10-CM

## 2024-01-09 DIAGNOSIS — J30.2 SEASONAL ALLERGIES: ICD-10-CM

## 2024-01-09 PROCEDURE — 3074F SYST BP LT 130 MM HG: CPT | Performed by: INTERNAL MEDICINE

## 2024-01-09 PROCEDURE — 3078F DIAST BP <80 MM HG: CPT | Performed by: INTERNAL MEDICINE

## 2024-01-09 PROCEDURE — 99214 OFFICE O/P EST MOD 30 MIN: CPT | Performed by: INTERNAL MEDICINE

## 2024-01-09 RX ORDER — LEVOTHYROXINE SODIUM 0.05 MG/1
50 TABLET ORAL
Qty: 90 TABLET | Refills: 3 | Status: SHIPPED | OUTPATIENT
Start: 2024-01-09

## 2024-01-09 RX ORDER — TRAZODONE HYDROCHLORIDE 50 MG/1
50 TABLET ORAL NIGHTLY
Qty: 30 TABLET | Refills: 3 | Status: SHIPPED | OUTPATIENT
Start: 2024-01-09

## 2024-01-09 RX ORDER — LORATADINE 10 MG/1
10 TABLET ORAL DAILY
Qty: 90 TABLET | Refills: 3 | Status: SHIPPED | OUTPATIENT
Start: 2024-01-09

## 2024-01-09 RX ORDER — ROSUVASTATIN CALCIUM 20 MG/1
20 TABLET, COATED ORAL EVERY EVENING
Qty: 90 TABLET | Refills: 3 | Status: SHIPPED | OUTPATIENT
Start: 2024-01-09

## 2024-01-09 SDOH — ECONOMIC STABILITY: TRANSPORTATION INSECURITY
IN THE PAST 12 MONTHS, HAS LACK OF TRANSPORTATION KEPT YOU FROM MEETINGS, WORK, OR FROM GETTING THINGS NEEDED FOR DAILY LIVING?: PATIENT DECLINED

## 2024-01-09 SDOH — ECONOMIC STABILITY: FOOD INSECURITY: WITHIN THE PAST 12 MONTHS, YOU WORRIED THAT YOUR FOOD WOULD RUN OUT BEFORE YOU GOT MONEY TO BUY MORE.: PATIENT DECLINED

## 2024-01-09 SDOH — HEALTH STABILITY: PHYSICAL HEALTH: ON AVERAGE, HOW MANY MINUTES DO YOU ENGAGE IN EXERCISE AT THIS LEVEL?: 20 MIN

## 2024-01-09 SDOH — ECONOMIC STABILITY: HOUSING INSECURITY
IN THE LAST 12 MONTHS, WAS THERE A TIME WHEN YOU DID NOT HAVE A STEADY PLACE TO SLEEP OR SLEPT IN A SHELTER (INCLUDING NOW)?: PATIENT REFUSED

## 2024-01-09 SDOH — HEALTH STABILITY: PHYSICAL HEALTH: ON AVERAGE, HOW MANY DAYS PER WEEK DO YOU ENGAGE IN MODERATE TO STRENUOUS EXERCISE (LIKE A BRISK WALK)?: 7 DAYS

## 2024-01-09 SDOH — ECONOMIC STABILITY: TRANSPORTATION INSECURITY
IN THE PAST 12 MONTHS, HAS LACK OF RELIABLE TRANSPORTATION KEPT YOU FROM MEDICAL APPOINTMENTS, MEETINGS, WORK OR FROM GETTING THINGS NEEDED FOR DAILY LIVING?: PATIENT DECLINED

## 2024-01-09 SDOH — HEALTH STABILITY: MENTAL HEALTH
STRESS IS WHEN SOMEONE FEELS TENSE, NERVOUS, ANXIOUS, OR CAN'T SLEEP AT NIGHT BECAUSE THEIR MIND IS TROUBLED. HOW STRESSED ARE YOU?: TO SOME EXTENT

## 2024-01-09 SDOH — ECONOMIC STABILITY: INCOME INSECURITY: IN THE LAST 12 MONTHS, WAS THERE A TIME WHEN YOU WERE NOT ABLE TO PAY THE MORTGAGE OR RENT ON TIME?: PATIENT REFUSED

## 2024-01-09 SDOH — ECONOMIC STABILITY: HOUSING INSECURITY

## 2024-01-09 SDOH — ECONOMIC STABILITY: FOOD INSECURITY: WITHIN THE PAST 12 MONTHS, THE FOOD YOU BOUGHT JUST DIDN'T LAST AND YOU DIDN'T HAVE MONEY TO GET MORE.: PATIENT DECLINED

## 2024-01-09 SDOH — ECONOMIC STABILITY: TRANSPORTATION INSECURITY
IN THE PAST 12 MONTHS, HAS THE LACK OF TRANSPORTATION KEPT YOU FROM MEDICAL APPOINTMENTS OR FROM GETTING MEDICATIONS?: PATIENT DECLINED

## 2024-01-09 SDOH — ECONOMIC STABILITY: INCOME INSECURITY: HOW HARD IS IT FOR YOU TO PAY FOR THE VERY BASICS LIKE FOOD, HOUSING, MEDICAL CARE, AND HEATING?: PATIENT DECLINED

## 2024-01-09 ASSESSMENT — SOCIAL DETERMINANTS OF HEALTH (SDOH)
DO YOU BELONG TO ANY CLUBS OR ORGANIZATIONS SUCH AS CHURCH GROUPS UNIONS, FRATERNAL OR ATHLETIC GROUPS, OR SCHOOL GROUPS?: PATIENT DECLINED
DO YOU BELONG TO ANY CLUBS OR ORGANIZATIONS SUCH AS CHURCH GROUPS UNIONS, FRATERNAL OR ATHLETIC GROUPS, OR SCHOOL GROUPS?: PATIENT DECLINED
HOW OFTEN DO YOU GET TOGETHER WITH FRIENDS OR RELATIVES?: PATIENT DECLINED
IN A TYPICAL WEEK, HOW MANY TIMES DO YOU TALK ON THE PHONE WITH FAMILY, FRIENDS, OR NEIGHBORS?: PATIENT DECLINED
WITHIN THE PAST 12 MONTHS, YOU WORRIED THAT YOUR FOOD WOULD RUN OUT BEFORE YOU GOT THE MONEY TO BUY MORE: PATIENT DECLINED
HOW HARD IS IT FOR YOU TO PAY FOR THE VERY BASICS LIKE FOOD, HOUSING, MEDICAL CARE, AND HEATING?: PATIENT DECLINED
HOW OFTEN DO YOU HAVE A DRINK CONTAINING ALCOHOL: NEVER
IN A TYPICAL WEEK, HOW MANY TIMES DO YOU TALK ON THE PHONE WITH FAMILY, FRIENDS, OR NEIGHBORS?: PATIENT DECLINED
HOW MANY DRINKS CONTAINING ALCOHOL DO YOU HAVE ON A TYPICAL DAY WHEN YOU ARE DRINKING: PATIENT DOES NOT DRINK
HOW OFTEN DO YOU GET TOGETHER WITH FRIENDS OR RELATIVES?: PATIENT DECLINED
HOW OFTEN DO YOU ATTEND CHURCH OR RELIGIOUS SERVICES?: PATIENT DECLINED
HOW OFTEN DO YOU HAVE SIX OR MORE DRINKS ON ONE OCCASION: NEVER
HOW OFTEN DO YOU ATTEND CHURCH OR RELIGIOUS SERVICES?: PATIENT DECLINED
HOW OFTEN DO YOU ATTENT MEETINGS OF THE CLUB OR ORGANIZATION YOU BELONG TO?: PATIENT DECLINED
HOW OFTEN DO YOU ATTENT MEETINGS OF THE CLUB OR ORGANIZATION YOU BELONG TO?: PATIENT DECLINED

## 2024-01-09 ASSESSMENT — LIFESTYLE VARIABLES
SKIP TO QUESTIONS 9-10: 1
HOW MANY STANDARD DRINKS CONTAINING ALCOHOL DO YOU HAVE ON A TYPICAL DAY: PATIENT DOES NOT DRINK
HOW OFTEN DO YOU HAVE SIX OR MORE DRINKS ON ONE OCCASION: NEVER
AUDIT-C TOTAL SCORE: 0
HOW OFTEN DO YOU HAVE A DRINK CONTAINING ALCOHOL: NEVER

## 2024-01-09 NOTE — PROGRESS NOTES
CC: Evelyn Meeks is a 63 y.o. female is suffering from   Chief Complaint   Patient presents with    Medication Refill         SUBJECTIVE:  1. PE (physical exam), annual  Griselda is here for physical examination is doing well, states that she is still raising 4 grandchildren, daughter-in-law apparently suffers from serious alcoholism and refuses to seek treatment    2. Vitamin D deficiency  Patient with a history of vitamin D deficiency recheck levels    3. Anxiety  Anxiety associated with raising 4 grandchildren, also  who is considerably older who is suffering from metastatic prostate cancer    4. Dyslipidemia  History of dyslipidemia recheck lipid profile    5. Arthritis  No change in medical therapy    6. Seasonal allergies  Continue Claritin    7. Insomnia, unspecified type  Start trazodone    8. Hypothyroidism, unspecified type  Continue thyroid supplementation        Past social, family, history: , 2 children of her own  Social History     Tobacco Use    Smoking status: Never    Smokeless tobacco: Never   Vaping Use    Vaping Use: Never used   Substance Use Topics    Alcohol use: No     Alcohol/week: 0.0 oz    Drug use: No         MEDICATIONS:    Current Outpatient Medications:     rosuvastatin (CRESTOR) 20 MG Tab, Take 1 Tablet by mouth every evening., Disp: 90 Tablet, Rfl: 3    levothyroxine (SYNTHROID) 50 MCG Tab, Take 1 Tablet by mouth every morning on an empty stomach., Disp: 90 Tablet, Rfl: 3    loratadine (CLARITIN) 10 MG Tab, Take 1 Tablet by mouth every day., Disp: 90 Tablet, Rfl: 3    traZODone (DESYREL) 50 MG Tab, Take 1 Tablet by mouth every evening., Disp: 30 Tablet, Rfl: 3    ibuprofen (MOTRIN) 800 MG Tab, TAKE ONE TABLET BY MOUTH EVERY 8 HOURS AS NEEDED, Disp: 180 Tablet, Rfl: 3    amoxicillin-clavulanate (AUGMENTIN) 875-125 MG Tab, Take 1 Tablet by mouth 2 times a day., Disp: 14 Tablet, Rfl: 0    acetaminophen (TYLENOL) 500 MG Tab, Take 2 Tabs by mouth every 6 hours.,  Disp: 30 Tab, Rfl: 0    Cholecalciferol (VITAMIN D-3) 1000 UNITS Cap, Take 1,000 Units by mouth every day., Disp: , Rfl:     PROBLEMS:  Patient Active Problem List    Diagnosis Date Noted    Closed fracture of sternum 01/12/2020    Trauma 01/12/2020    Elevated coronary artery calcium score: Approximately 720 18 07/06/2018    Abnormal screening cardiac CT 06/20/2018    Carpal tunnel syndrome on right 01/20/2017    Thoracic back pain 12/12/2016    Closed fracture of distal end of right radius 08/10/2016    Other specified hypothyroidism 07/19/2016    Osteoporosis 01/15/2016    Grief at loss of child 08/04/2015    ADD (attention deficit disorder) 03/26/2013    Gastroesophageal reflux disease 09/07/2012    Anal fissure 10/06/2011    Dyslipidemia 08/19/2011       REVIEW OF SYSTEMS:  General: Patient denies any problems with nausea vomiting fever chills chest pain or tightness.  Head:  No history of strokes seizures severe head trauma or loss of consciousness.   HEENT: No history of any significant vision loss, hearing loss, changes in sense of smell hoarseness  Heart: No chest pain tightness squeezing.   Lungs: No recurrent asthma bronchitis pneumonia.   Gastrointestinal: No history of black or bloody stools or  Constipation colonoscopy was done.  Genitourinary:  No increased frequency urgency pain and burning with urination  Musculoskeletal: No joint pain or discomfort.   Skin: No skin changes      PHYSICAL EXAM   Constitutional: Alert, cooperative, not in acute distress.  Cardiovascular:  Rate Rhythm is regular without murmurs rubs clicks.     Thorax & Lungs: Clear to auscultation, no wheezing, rhonchi, or rales  HENT: Normocephalic, Atraumatic.  Eyes: PERRLA, EOMI, Conjunctiva normal.   Neck: Trachia is midline no swelling of the thyroid.   Lymphatic: No lymphadenopathy noted.   Abdomin: Soft non-tender, no rebound, no guarding.   Skin: Warm, Dry, No erythema, No rash.   Extremities: Atraumatic with symmetric distal  "pulses, No edema, No tenderness, No cyanosis, No clubbing.   Musculoskeletal: Questionable arthritis associate with the hands  Neurologic: Alert & oriented x 3, cranial nerves II through XII are intact, Normal motor function, Normal sensory function, No focal deficits noted.   Psychiatric: Affect normal, Judgment normal, Mood normal.     VITAL SIGNS:/68 (BP Location: Left arm, Patient Position: Sitting, BP Cuff Size: Adult)   Pulse 62   Temp 36.9 °C (98.4 °F)   Resp 16   Ht 1.702 m (5' 7\")   Wt 65.3 kg (144 lb)   LMP 08/28/2002   SpO2 99%   BMI 22.55 kg/m²     Labs: Reviewed    Assessment:                                                     Plan:    1. PE (physical exam), annual  Patient appears to be medically stable  - Comp Metabolic Panel; Future  - CBC WITH DIFFERENTIAL; Future    2. Vitamin D deficiency  Recheck vitamin D  - Lipid Profile; Future  - Comp Metabolic Panel; Future  - CBC WITH DIFFERENTIAL; Future  - VITAMIN D,25 HYDROXY (DEFICIENCY); Future    3. Anxiety  Start trazodone  - Lipid Profile; Future  - Comp Metabolic Panel; Future  - CBC WITH DIFFERENTIAL; Future    4. Dyslipidemia  Recheck lipid profile continue Crestor  - rosuvastatin (CRESTOR) 20 MG Tab; Take 1 Tablet by mouth every evening.  Dispense: 90 Tablet; Refill: 3  - Lipid Profile; Future  - Comp Metabolic Panel; Future  - CBC WITH DIFFERENTIAL; Future    5. Arthritis  Stable, probable osteoarthritis hands  - levothyroxine (SYNTHROID) 50 MCG Tab; Take 1 Tablet by mouth every morning on an empty stomach.  Dispense: 90 Tablet; Refill: 3  - Lipid Profile; Future  - Comp Metabolic Panel; Future  - CBC WITH DIFFERENTIAL; Future    6. Seasonal allergies  Continue Claritin  - loratadine (CLARITIN) 10 MG Tab; Take 1 Tablet by mouth every day.  Dispense: 90 Tablet; Refill: 3  - Lipid Profile; Future  - Comp Metabolic Panel; Future  - CBC WITH DIFFERENTIAL; Future    7. Insomnia, unspecified type  Start trazodone for anxiety but also " for insomnia  - traZODone (DESYREL) 50 MG Tab; Take 1 Tablet by mouth every evening.  Dispense: 30 Tablet; Refill: 3  - Comp Metabolic Panel; Future  - CBC WITH DIFFERENTIAL; Future    8. Hypothyroidism, unspecified type  Recheck free T4 TSH  - TSH+FREE T4  - Comp Metabolic Panel; Future  - CBC WITH DIFFERENTIAL; Future      Raising 4 grandchildren.

## 2024-02-01 ENCOUNTER — PATIENT MESSAGE (OUTPATIENT)
Dept: HEALTH INFORMATION MANAGEMENT | Facility: OTHER | Age: 64
End: 2024-02-01

## 2024-04-08 ENCOUNTER — APPOINTMENT (OUTPATIENT)
Dept: MEDICAL GROUP | Facility: LAB | Age: 64
End: 2024-04-08

## 2025-01-03 NOTE — TELEPHONE ENCOUNTER
Was the patient seen in the last year in this department? Yes lov 10/29/2019    Does patient have an active prescription for medications requested? No     Received Request Via: Pharmacy  
no

## (undated) DEVICE — PACK UPPER EXTREMITY SM OR - (3/CA)

## (undated) DEVICE — GLOVE BIOGEL SZ 8 SURGICAL PF LTX - (50PR/BX 4BX/CA)

## (undated) DEVICE — CANISTER SUCTION RIGID RED 1500CC (40EA/CA)

## (undated) DEVICE — BOVIE NEEDLE TIP INSULATD NON-SAFETY 2CM (50/PK)

## (undated) DEVICE — CHLORAPREP 26 ML APPLICATOR - ORANGE TINT(25/CA)

## (undated) DEVICE — ELECTRODE 850 FOAM ADHESIVE - HYDROGEL RADIOTRNSPRNT (50/PK)

## (undated) DEVICE — DRAPE C-ARM LARGE 41IN X 74 IN - (10/BX 2BX/CA)

## (undated) DEVICE — DRESSING XEROFORM 1X8 - (50/BX 4BX/CA)

## (undated) DEVICE — HEAD HOLDER JUNIOR/ADULT

## (undated) DEVICE — SLING ORTH UNV TIETX VLFM ARM

## (undated) DEVICE — SODIUM CHL IRRIGATION 0.9% 1000ML (12EA/CA)

## (undated) DEVICE — SUTURE 3-0 VICRYL PLUS RB-1 - 8 X 18 INCH (12/BX)

## (undated) DEVICE — MASK, LARYNGEAL AIRWAY #4

## (undated) DEVICE — MASK ANESTHESIA ADULT  - (100/CA)

## (undated) DEVICE — HUMID-VENT HEAT AND MOISTURE EXCHANGE- (50/BX)

## (undated) DEVICE — SUTURE GENERAL

## (undated) DEVICE — LACTATED RINGERS INJ 1000 ML - (14EA/CA 60CA/PF)

## (undated) DEVICE — NEPTUNE 4 PORT MANIFOLD - (20/PK)

## (undated) DEVICE — SUTURE 4-0 ETHILON PS-2 18 (12PK/BX)"

## (undated) DEVICE — SPLINT PLASTER 3 IN X 15 IN - (50/BX 12BX/CA)

## (undated) DEVICE — SPONGE DRAIN 4 X 4IN 6-PLY - (2/PK25PK/BX12BX/CS)

## (undated) DEVICE — PROTECTOR ULNA NERVE - (36PR/CA)

## (undated) DEVICE — ELECTRODE DUAL RETURN W/ CORD - (50/PK)

## (undated) DEVICE — SENSOR SPO2 NEO LNCS ADHESIVE (20/BX) SEE USER NOTES

## (undated) DEVICE — PAD PREP 24 X 48 CUFFED - (100/CA)

## (undated) DEVICE — GLOVE BIOGEL SZ 7 SURGICAL PF LTX - (50PR/BX 4BX/CA)

## (undated) DEVICE — DRAPE STRLE REG TOWEL 18X24 - (10/BX 4BX/CA)"

## (undated) DEVICE — PACK LOWER EXTREMITY - (2/CA)

## (undated) DEVICE — GLOVE, LITE (PAIR)

## (undated) DEVICE — GLOVE BIOGEL INDICATOR SZ 8 SURGICAL PF LTX - (50/BX 4BX/CA)

## (undated) DEVICE — PADDING CAST 4 IN X 4 YDS - SOF-ROLL (12RL/BG 6BG/CT)

## (undated) DEVICE — PADDING CAST 4 IN STERILE - 4 X 4 YDS (24/CA)

## (undated) DEVICE — KIT ANESTHESIA W/CIRCUIT & 3/LT BAG W/FILTER (20EA/CA)

## (undated) DEVICE — TUBE CONNECTING SUCTION - CLEAR PLASTIC STERILE 72 IN (50EA/CA)

## (undated) DEVICE — BLADE SURGICAL #15 - (50/BX 3BX/CA)

## (undated) DEVICE — SUTURE 6-0 ETHILON P-1 18 (12PK/BX)"

## (undated) DEVICE — BAG, SPONGE COUNT 50600

## (undated) DEVICE — STOCKINET BIAS 4 IN STERILE - (20/CA)

## (undated) DEVICE — WATER IRRIGATION STERILE 1000ML (12EA/CA)

## (undated) DEVICE — TOURNIQUET CUFF 18 X 3 ONE PORT DISP - STERILE (10/BX)

## (undated) DEVICE — TRAY SRGPRP PVP IOD WT PRP - (20/CA)

## (undated) DEVICE — SUCTION INSTRUMENT YANKAUER BULBOUS TIP W/O VENT (50EA/CA)

## (undated) DEVICE — KIT ROOM DECONTAMINATION

## (undated) DEVICE — GOWN WARMING STANDARD FLEX - (30/CA)